# Patient Record
Sex: FEMALE | Race: WHITE | Employment: OTHER | URBAN - METROPOLITAN AREA
[De-identification: names, ages, dates, MRNs, and addresses within clinical notes are randomized per-mention and may not be internally consistent; named-entity substitution may affect disease eponyms.]

---

## 2017-05-08 PROCEDURE — 87088 URINE BACTERIA CULTURE: CPT | Performed by: INTERNAL MEDICINE

## 2017-05-08 PROCEDURE — 87186 SC STD MICRODIL/AGAR DIL: CPT | Performed by: INTERNAL MEDICINE

## 2017-05-08 PROCEDURE — 87086 URINE CULTURE/COLONY COUNT: CPT | Performed by: INTERNAL MEDICINE

## 2017-05-24 PROCEDURE — 87086 URINE CULTURE/COLONY COUNT: CPT | Performed by: INTERNAL MEDICINE

## 2017-06-05 PROCEDURE — 87086 URINE CULTURE/COLONY COUNT: CPT | Performed by: INTERNAL MEDICINE

## 2017-09-08 PROBLEM — H40.52X2: Status: ACTIVE | Noted: 2017-09-08

## 2017-09-18 PROCEDURE — 87186 SC STD MICRODIL/AGAR DIL: CPT | Performed by: INTERNAL MEDICINE

## 2017-09-18 PROCEDURE — 87086 URINE CULTURE/COLONY COUNT: CPT | Performed by: INTERNAL MEDICINE

## 2017-09-18 PROCEDURE — 87088 URINE BACTERIA CULTURE: CPT | Performed by: INTERNAL MEDICINE

## 2017-09-20 PROBLEM — R51.9 LEFT FACIAL PRESSURE AND PAIN: Status: ACTIVE | Noted: 2017-09-20

## 2017-09-20 PROBLEM — J33.9 NASAL POLYPS: Status: ACTIVE | Noted: 2017-09-20

## 2017-09-24 ENCOUNTER — HOSPITAL ENCOUNTER (EMERGENCY)
Facility: HOSPITAL | Age: 77
Discharge: HOME | End: 2017-09-24
Attending: EMERGENCY MEDICINE
Payer: MEDICARE

## 2017-09-24 VITALS
HEIGHT: 67 IN | DIASTOLIC BLOOD PRESSURE: 88 MMHG | WEIGHT: 163 LBS | RESPIRATION RATE: 18 BRPM | HEART RATE: 80 BPM | OXYGEN SATURATION: 97 % | SYSTOLIC BLOOD PRESSURE: 179 MMHG | TEMPERATURE: 99 F | BODY MASS INDEX: 25.58 KG/M2

## 2017-09-24 DIAGNOSIS — H53.9 VISUAL CHANGES: Primary | ICD-10-CM

## 2017-09-24 PROCEDURE — 99283 EMERGENCY DEPT VISIT LOW MDM: CPT

## 2017-09-24 NOTE — ED PROVIDER NOTES
Patient Seen in: [facility] Emergency Department    History   Patient presents with:   Eye Visual Problem (opthalmic)    Stated Complaint: hemorrhages on the eye  wants to see an opthomologist right away    HPI    Patient has a history of ankylosing PPD     Father had TB, seen by Dr. Deirdre Jacobo, treated for 9 months   • Intestinal disaccharidase deficiencies and disaccharide malabsorption    • Other and unspecified hyperlipidemia    • Rheumatoid arthritis and other inflammatory polyarthropathies Comment: w/BSO  5/4/09: UP GI ENDOSCOPY,BALL DIL,30MM  6/12/14: UP GI ENDOSCOPY,BALL DIL,30MM      Comment: distal esophageal stricture, bx and dilated                18-20 mm  5/4/09: UPPER GI ENDOSCOPY,BIOPSY      Comment: antral gastric erosions, distal 25.53 kg/m²         Physical Exam  Eyes: Lids and lashes are normal.  Extraocular movements are intact. There is 100% subconjunctival hematoma noted in both eyes. The pupils are midrange, round, and reactive.   The anterior chamber is deep and no hyphema p

## 2017-09-24 NOTE — ED INITIAL ASSESSMENT (HPI)
Pt reports bilateral scleral hemorrhages that started 3 days ago. Reports pain and blurred vision. Seen in urgent care today. Took prednisone PTA.

## 2017-09-25 PROBLEM — H40.52X0: Status: ACTIVE | Noted: 2017-09-08

## 2017-09-25 NOTE — ED NOTES
Pt given water and crackers upon request and MD approval. Pt instructed to take her prescription meds that she has with her upon Dr Philip Santacruz verbal order.

## 2017-10-02 PROCEDURE — 87086 URINE CULTURE/COLONY COUNT: CPT | Performed by: INTERNAL MEDICINE

## 2017-10-06 ENCOUNTER — HOSPITAL ENCOUNTER (INPATIENT)
Facility: HOSPITAL | Age: 77
LOS: 1 days | Discharge: HOME HEALTH | DRG: 543 | End: 2017-10-12
Attending: EMERGENCY MEDICINE | Admitting: HOSPITALIST
Payer: MEDICARE

## 2017-10-06 ENCOUNTER — APPOINTMENT (OUTPATIENT)
Dept: GENERAL RADIOLOGY | Facility: HOSPITAL | Age: 77
DRG: 543 | End: 2017-10-06
Attending: EMERGENCY MEDICINE
Payer: MEDICARE

## 2017-10-06 DIAGNOSIS — M54.9 BACK PAIN WITHOUT RADIATION: Primary | ICD-10-CM

## 2017-10-06 PROCEDURE — 96375 TX/PRO/DX INJ NEW DRUG ADDON: CPT

## 2017-10-06 PROCEDURE — 72100 X-RAY EXAM L-S SPINE 2/3 VWS: CPT | Performed by: EMERGENCY MEDICINE

## 2017-10-06 PROCEDURE — 85025 COMPLETE CBC W/AUTO DIFF WBC: CPT | Performed by: EMERGENCY MEDICINE

## 2017-10-06 PROCEDURE — 96374 THER/PROPH/DIAG INJ IV PUSH: CPT

## 2017-10-06 PROCEDURE — 99285 EMERGENCY DEPT VISIT HI MDM: CPT

## 2017-10-06 PROCEDURE — 80053 COMPREHEN METABOLIC PANEL: CPT | Performed by: EMERGENCY MEDICINE

## 2017-10-06 PROCEDURE — 96376 TX/PRO/DX INJ SAME DRUG ADON: CPT

## 2017-10-06 RX ORDER — KETOROLAC TROMETHAMINE 30 MG/ML
15 INJECTION, SOLUTION INTRAMUSCULAR; INTRAVENOUS EVERY 6 HOURS PRN
Status: DISCONTINUED | OUTPATIENT
Start: 2017-10-06 | End: 2017-10-07

## 2017-10-06 RX ORDER — BRIMONIDINE TARTRATE 2 MG/ML
1 SOLUTION/ DROPS OPHTHALMIC 3 TIMES DAILY
Status: DISCONTINUED | OUTPATIENT
Start: 2017-10-06 | End: 2017-10-12

## 2017-10-06 RX ORDER — ALBUTEROL SULFATE 90 UG/1
2 AEROSOL, METERED RESPIRATORY (INHALATION) EVERY 6 HOURS PRN
Status: DISCONTINUED | OUTPATIENT
Start: 2017-10-06 | End: 2017-10-12

## 2017-10-06 RX ORDER — HYDROMORPHONE HYDROCHLORIDE 1 MG/ML
0.5 INJECTION, SOLUTION INTRAMUSCULAR; INTRAVENOUS; SUBCUTANEOUS EVERY 2 HOUR PRN
Status: DISCONTINUED | OUTPATIENT
Start: 2017-10-06 | End: 2017-10-12

## 2017-10-06 RX ORDER — ONDANSETRON 2 MG/ML
INJECTION INTRAMUSCULAR; INTRAVENOUS
Status: DISPENSED
Start: 2017-10-06 | End: 2017-10-07

## 2017-10-06 RX ORDER — SULFASALAZINE 500 MG/1
1000 TABLET, DELAYED RELEASE ORAL 2 TIMES DAILY
Status: DISCONTINUED | OUTPATIENT
Start: 2017-10-06 | End: 2017-10-12

## 2017-10-06 RX ORDER — CYCLOBENZAPRINE HCL 5 MG
5 TABLET ORAL 3 TIMES DAILY PRN
Status: DISCONTINUED | OUTPATIENT
Start: 2017-10-06 | End: 2017-10-12

## 2017-10-06 RX ORDER — ONDANSETRON 2 MG/ML
4 INJECTION INTRAMUSCULAR; INTRAVENOUS ONCE
Status: COMPLETED | OUTPATIENT
Start: 2017-10-06 | End: 2017-10-06

## 2017-10-06 RX ORDER — ONDANSETRON 2 MG/ML
4 INJECTION INTRAMUSCULAR; INTRAVENOUS EVERY 6 HOURS PRN
Status: DISCONTINUED | OUTPATIENT
Start: 2017-10-06 | End: 2017-10-12

## 2017-10-06 RX ORDER — TIMOLOL MALEATE 5 MG/ML
1 SOLUTION/ DROPS OPHTHALMIC 2 TIMES DAILY
Status: DISCONTINUED | OUTPATIENT
Start: 2017-10-06 | End: 2017-10-12

## 2017-10-06 RX ORDER — DEXAMETHASONE SODIUM PHOSPHATE 4 MG/ML
10 VIAL (ML) INJECTION ONCE
Status: COMPLETED | OUTPATIENT
Start: 2017-10-06 | End: 2017-10-06

## 2017-10-06 RX ORDER — HYDROCODONE BITARTRATE AND ACETAMINOPHEN 5; 325 MG/1; MG/1
1 TABLET ORAL EVERY 6 HOURS PRN
Status: DISCONTINUED | OUTPATIENT
Start: 2017-10-06 | End: 2017-10-09

## 2017-10-06 RX ORDER — HYDRALAZINE HYDROCHLORIDE 25 MG/1
25 TABLET, FILM COATED ORAL EVERY 6 HOURS PRN
Status: DISCONTINUED | OUTPATIENT
Start: 2017-10-06 | End: 2017-10-12

## 2017-10-06 RX ORDER — HYDROMORPHONE HYDROCHLORIDE 1 MG/ML
1 INJECTION, SOLUTION INTRAMUSCULAR; INTRAVENOUS; SUBCUTANEOUS EVERY 2 HOUR PRN
Status: DISCONTINUED | OUTPATIENT
Start: 2017-10-06 | End: 2017-10-12

## 2017-10-06 RX ORDER — HYDROMORPHONE HYDROCHLORIDE 1 MG/ML
0.5 INJECTION, SOLUTION INTRAMUSCULAR; INTRAVENOUS; SUBCUTANEOUS EVERY 30 MIN PRN
Status: COMPLETED | OUTPATIENT
Start: 2017-10-06 | End: 2017-10-10

## 2017-10-06 RX ORDER — ACETAMINOPHEN 325 MG/1
650 TABLET ORAL EVERY 6 HOURS PRN
Status: DISCONTINUED | OUTPATIENT
Start: 2017-10-06 | End: 2017-10-12

## 2017-10-06 RX ORDER — ATORVASTATIN CALCIUM 10 MG/1
10 TABLET, FILM COATED ORAL NIGHTLY
Status: DISCONTINUED | OUTPATIENT
Start: 2017-10-06 | End: 2017-10-12

## 2017-10-06 NOTE — H&P
DMg hospitalist H+P    Emanuel Betancourt MD  CC back pain  HPI 75 yo female with multiple medical problems inclduing ankylosing spondylitis, GERD was recently treated for UTI and thus stopped Humira for her back issues(now restarted) Now developed se remember  2005: OTHER AMBL SURG      Comment: R leg tendon surgery with post-op wound                infection  No date: OTHER SURGICAL HISTORY      Comment: right ankle surgery  5/7/13: OTHER SURGICAL HISTORY      Comment:  left 1st metatarsophalangeal fu [OTHER] Brother        Social History  Social History   Marital status:   Spouse name: N/A    Years of education: N/A  Number of children: N/A     Occupational History  None on file     Social History Main Topics   Smoking status: Never Smoker    Sm (FLONASE) 50 MCG/ACT Nasal Suspension 1 spray by Nasal route 2 (two) times daily. Disp: 16 g Rfl: 1   Albuterol Sulfate  (90 Base) MCG/ACT Inhalation Aero Soln Inhale 2 puffs into the lungs every 6 (six) hours as needed (cough).  Disp: 1 Inhaler Rfl: intact in all four extremitties, no foot drop, she is able to move extremities    Labs  WBC 12.2 Hg 11 Plt 326    Na 140 K 3.6 Cl 110 BUN 21, creatinine 0.41 glucose 127  AST 30  ALT 51    X-ray lumbar spine personally reviewed result in EPIC no acute frac

## 2017-10-06 NOTE — ED PROVIDER NOTES
Patient Seen in: [facility] Emergency Department    History   Patient presents with:  Back Pain (musculoskeletal)    Stated Complaint: back pain    HPI    The patient is a 75-year-old female with a history of ankylosing spondylitis, who was recently APPENDECTOMY  No date: BIOPSY OF BREAST, INCISIONAL      Comment: L Breast  2005: CHOLECYSTECTOMY  2/13/06: COLONOSCOPY,BIOPSY      Comment: nonspecific erythema transverse colon (bx                marked acute & chronic colitis), ileum wnl  1990: Amilcar Helm 18-20 mm, Performed by Kelley Cramer at                [address] [place], Performed [facility]                [facility]  2/10: UPPER GI ENDOSCOPY,BIOPSY      Comment: 3 gastric ulcers from diclofenac, bx for lymphadenopathy. Oropharynx nml. Mucus membranes moist.   Supple neck  Cardiovascular: Regular rhythm without murmurs rubs or gallops, no peripheral edema or JVD. DP pulses 1+ bilaterally.   Lungs: Speaking full sentences without any distress or retraction Course  ------------------------------------------------------------   Blood was obtained and peripheral IV access was established. She was given IV Decadron and Dilaudid. X-ray of lumbar spine was obtained.   I believe her pain is musculoskeletal.  She h

## 2017-10-07 PROCEDURE — 86140 C-REACTIVE PROTEIN: CPT | Performed by: HOSPITALIST

## 2017-10-07 PROCEDURE — 87086 URINE CULTURE/COLONY COUNT: CPT | Performed by: HOSPITALIST

## 2017-10-07 PROCEDURE — 80048 BASIC METABOLIC PNL TOTAL CA: CPT | Performed by: HOSPITALIST

## 2017-10-07 PROCEDURE — 85652 RBC SED RATE AUTOMATED: CPT | Performed by: HOSPITALIST

## 2017-10-07 PROCEDURE — 85025 COMPLETE CBC W/AUTO DIFF WBC: CPT | Performed by: HOSPITALIST

## 2017-10-07 PROCEDURE — 97116 GAIT TRAINING THERAPY: CPT

## 2017-10-07 PROCEDURE — 97162 PT EVAL MOD COMPLEX 30 MIN: CPT

## 2017-10-07 PROCEDURE — 81001 URINALYSIS AUTO W/SCOPE: CPT | Performed by: HOSPITALIST

## 2017-10-07 PROCEDURE — 96376 TX/PRO/DX INJ SAME DRUG ADON: CPT

## 2017-10-07 PROCEDURE — 87040 BLOOD CULTURE FOR BACTERIA: CPT | Performed by: HOSPITALIST

## 2017-10-07 RX ORDER — POTASSIUM CHLORIDE 20 MEQ/1
40 TABLET, EXTENDED RELEASE ORAL EVERY 4 HOURS
Status: COMPLETED | OUTPATIENT
Start: 2017-10-07 | End: 2017-10-07

## 2017-10-07 RX ORDER — AMLODIPINE BESYLATE 5 MG/1
5 TABLET ORAL DAILY
Status: DISCONTINUED | OUTPATIENT
Start: 2017-10-07 | End: 2017-10-12

## 2017-10-07 NOTE — CONSULTS
[facility] Dosing Service  Antibiotic Dosing    Ward Lim is a 76year old female for whom pharmacy is dosing Ancef for treatment of  UTI. Allergies: is allergic to actonel [risedronate sodium]; milk; and proton pump inhibitors.     Vitals: BP 1

## 2017-10-07 NOTE — PAYOR COMM NOTE
--------------  ADMISSION REVIEW     Payor: [facility] [place] #:  [id number]    10/6    ED       Patient presents with:  Back Pain     Stated Complaint: back pain          The patient is a 24-year-old female with a history of ankylosing bowel sounds. Soft, nondistended. No HSM or masses. Nontender throughout abdomen. No CVA tenderness. Back: No midline step-offs but she is mildly tender throughout the lumbar spine in the paraspinal region of her lumbar back. No overlying skin changes. components within normal limits   CBC WITH DIFFERENTIAL WITH PLATELET     Narrative:      The following orders were created for panel order CBC WITH DIFFERENTIAL WITH PLATELET.      Xr Lumbar Spine (min 2 Views) (cpt=72100)     Result Date: 10/6/2017  PROCE

## 2017-10-07 NOTE — PLAN OF CARE
PAIN - ADULT    • Verbalizes/displays adequate comfort level or patient's stated pain goal Progressing        Patient/Family Goals    • Patient/Family Long Term Goal Progressing    • Patient/Family Short Term Goal Progressing        Plan of care explained

## 2017-10-07 NOTE — PHYSICAL THERAPY NOTE
PHYSICAL THERAPY EVALUATION - INPATIENT     Room Number: 385/385-A  Evaluation Date: 10/7/2017  Type of Evaluation: Initial  Physician Order: PT Eval and Treat    Presenting Problem: Back pain  Reason for Therapy: Mobility Dysfunction and Discharge Tawny tendon surgery with post-op wound                infection  No date: OTHER SURGICAL HISTORY      Comment: right ankle surgery  5/7/13: OTHER SURGICAL HISTORY      Comment:  left 1st metatarsophalangeal fusion.  Weil                osteotomy 2nd metatarsal j reports she is feeling better today    Patient self-stated goal is to feel better and go home    OBJECTIVE     Fall Risk: Standard fall risk    WEIGHT BEARING RESTRICTION                   PAIN ASSESSMENT  Ratin  Location: Back  Management Techniques: ambulation. The patient was able to perform supine to sit transition with supervision. The patient was able to perform sit to stand transfers with supervision.  The patient was able to ambulate 50 feet with supervision with some complaints of pain pain note Good  Frequency (Obs): 5x/week  Number of Visits to Meet Established Goals: 3      CURRENT GOALS    Goal #1 Patient is able to demonstrate supine - sit EOB @ level: independent     Goal #2 Patient is able to demonstrate transfers Sit to/from Stand at [facility]

## 2017-10-07 NOTE — PLAN OF CARE
PAIN - ADULT    • Verbalizes/displays adequate comfort level or patient's stated pain goal Progressing        Patient/Family Goals    • Patient/Family Short Term Goal Progressing        ALERT ,AWAKE, ORIENTED X4. REFUSED ALT EYE DROPS ORDER HERE.  PT STATES

## 2017-10-07 NOTE — PROGRESS NOTES
[facility] hospitalist daily note  Seen/exmined on 10/7/17    S; no chest pain, no SOB no abd pain but still has persistent back pain (lower back), constant, not radiating.  Patient denies numbness/tingling, she is able to move extremities, no loss of bowel/lorin

## 2017-10-08 ENCOUNTER — APPOINTMENT (OUTPATIENT)
Dept: MRI IMAGING | Facility: HOSPITAL | Age: 77
DRG: 543 | End: 2017-10-08
Attending: HOSPITALIST
Payer: MEDICARE

## 2017-10-08 PROBLEM — M54.9 BACK PAIN: Status: ACTIVE | Noted: 2017-10-08

## 2017-10-08 PROCEDURE — 96376 TX/PRO/DX INJ SAME DRUG ADON: CPT

## 2017-10-08 PROCEDURE — 72156 MRI NECK SPINE W/O & W/DYE: CPT | Performed by: HOSPITALIST

## 2017-10-08 PROCEDURE — 72149 MRI LUMBAR SPINE W/DYE: CPT | Performed by: HOSPITALIST

## 2017-10-08 PROCEDURE — A9575 INJ GADOTERATE MEGLUMI 0.1ML: HCPCS

## 2017-10-08 PROCEDURE — 72157 MRI CHEST SPINE W/O & W/DYE: CPT | Performed by: HOSPITALIST

## 2017-10-08 PROCEDURE — 84132 ASSAY OF SERUM POTASSIUM: CPT | Performed by: HOSPITALIST

## 2017-10-08 NOTE — CERTIFICATION
**Certification    PHYSICIAN Certification of Need for Inpatient Hospitalization    Based on the her current state of illness, Loyd Davidson requires inpatient hospitalization for her back pain, requiring multiple doses of IV pain medication (see MAR).  Marcelle Sharp

## 2017-10-08 NOTE — CM/SW NOTE
Patient failed inpatient criteria. Second level of review completed and supports observation. UR committee in agreement. Discussed with Dr. Tatum Toussaint who approves observation status. Observation and BALL letter given to the patient and order written.  Patient

## 2017-10-08 NOTE — PROGRESS NOTES
[facility] hospitalist daily note  Seen/exained on 10/8/17    S; patient with persistent severe back pain, no chest pain, no SOB, no loss of bowel/bladder control, no numbness/tingling    Medications in EPIC    PE;   10/08/17  0742   BP: (!) 162/53   Pulse: 8 culture and MRI result  Lisa Santoyo MD  [facility] hospitalist  [phone]

## 2017-10-08 NOTE — PAYOR COMM NOTE
Pt was in OBS status on 10/6. Attending placed in [address] [place] [address] on 10/8 but:   Appropriate for OBS status per guidelines for back pain. Status changed back to OBS on 10/8, CC44.     Bushra Yin  (MR # [id number])   Order Place in observation Once [ADT12] (Order pain control.  Per patient she CANNOT be on NSAIDs  Add lidocaine patch  -check ESR, CRP (patient with leukocytosis, recently had UTI)  -PT eval     Leukocytosis, Recent UTI; check UA        Elevated Blood pressure, patient with HTN ;   Start amlodipine

## 2017-10-08 NOTE — PLAN OF CARE
Impaired Functional Mobility    • Achieve highest/safest level of mobility/gait Progressing        PAIN - ADULT    • Verbalizes/displays adequate comfort level or patient's stated pain goal Progressing        Patient/Family Goals    • Patient/Family Short

## 2017-10-09 ENCOUNTER — APPOINTMENT (OUTPATIENT)
Dept: CT IMAGING | Facility: HOSPITAL | Age: 77
DRG: 543 | End: 2017-10-09
Attending: HOSPITALIST
Payer: MEDICARE

## 2017-10-09 PROCEDURE — 97116 GAIT TRAINING THERAPY: CPT

## 2017-10-09 PROCEDURE — 96376 TX/PRO/DX INJ SAME DRUG ADON: CPT

## 2017-10-09 PROCEDURE — 97530 THERAPEUTIC ACTIVITIES: CPT

## 2017-10-09 PROCEDURE — 85025 COMPLETE CBC W/AUTO DIFF WBC: CPT | Performed by: HOSPITALIST

## 2017-10-09 PROCEDURE — 80048 BASIC METABOLIC PNL TOTAL CA: CPT | Performed by: HOSPITALIST

## 2017-10-09 PROCEDURE — 70491 CT SOFT TISSUE NECK W/DYE: CPT | Performed by: HOSPITALIST

## 2017-10-09 RX ORDER — POLYETHYLENE GLYCOL 3350 17 G/17G
17 POWDER, FOR SOLUTION ORAL DAILY
Status: DISCONTINUED | OUTPATIENT
Start: 2017-10-09 | End: 2017-10-12

## 2017-10-09 RX ORDER — HYDROCODONE BITARTRATE AND ACETAMINOPHEN 5; 325 MG/1; MG/1
1 TABLET ORAL EVERY 6 HOURS PRN
Status: DISCONTINUED | OUTPATIENT
Start: 2017-10-09 | End: 2017-10-12

## 2017-10-09 RX ORDER — HYDROCODONE BITARTRATE AND ACETAMINOPHEN 5; 325 MG/1; MG/1
1-2 TABLET ORAL EVERY 6 HOURS PRN
Status: DISCONTINUED | OUTPATIENT
Start: 2017-10-09 | End: 2017-10-09

## 2017-10-09 RX ORDER — BISACODYL 10 MG
10 SUPPOSITORY, RECTAL RECTAL ONCE
Status: COMPLETED | OUTPATIENT
Start: 2017-10-09 | End: 2017-10-09

## 2017-10-09 RX ORDER — HYDROCODONE BITARTRATE AND ACETAMINOPHEN 5; 325 MG/1; MG/1
1 TABLET ORAL EVERY 6 HOURS
Status: DISCONTINUED | OUTPATIENT
Start: 2017-10-09 | End: 2017-10-10

## 2017-10-09 RX ORDER — LIDOCAINE 50 MG/G
1 PATCH TOPICAL EVERY 24 HOURS
Status: DISCONTINUED | OUTPATIENT
Start: 2017-10-09 | End: 2017-10-12

## 2017-10-09 RX ORDER — DOCUSATE SODIUM 100 MG/1
100 CAPSULE, LIQUID FILLED ORAL 2 TIMES DAILY
Status: DISCONTINUED | OUTPATIENT
Start: 2017-10-09 | End: 2017-10-12

## 2017-10-09 NOTE — PAYOR COMM NOTE
--------------  CONTINUED STAY REVIEW FOR 10/9    Payor: [address] [place] [address] #:  [id number]  Authorization Number: [id number]    Admit date: 10/8/17  Admit time: [address]    Admitting Physician: Spencer Gaytan MD  Attending Physician:  Spencer Gaytan

## 2017-10-09 NOTE — PLAN OF CARE
PAIN - ADULT    • Verbalizes/displays adequate comfort level or patient's stated pain goal Not Progressing        Patient/Family Goals    • Patient/Family Short Term Goal Not Progressing          SAFETY ADULT - FALL    • Free from fall injury Progressing

## 2017-10-09 NOTE — PROGRESS NOTES
Contacted [facility] to inform of order for brace for patient who has T12 compression fracture. When patient was asked to verify height & weight, she stated she was 5\"7\" now & not 5\"10\" as noted in chart.  Information given to Shanda as requested

## 2017-10-09 NOTE — PROGRESS NOTES
[facility] hospitalist daily note  Patient was seen/examined on 10/9/17    S; still has back pain. No numbness/tingling, no paresis. Reviewed with patient and her spouse results of the ERICK and Urine culture result.  I also spoke with the patient about alina rule out spine infection)  -pain medication ordered, including dilaudid IV  -patient with still significant pain.  Brace ordered         Leukocytosis, elevated ESR, CRP in immunosupressed patient; no enhancing lesions on MRi spine  -blood culture no growth

## 2017-10-09 NOTE — PHYSICAL THERAPY NOTE
PHYSICAL THERAPY TREATMENT NOTE - INPATIENT    Room Number: 385/385-A     Session: 1  Number of Visits to Meet Established Goals: 3    Presenting Problem: Back pain        MRI conclusion from 10/8/17.  Subacute moderate to marked superior endplate compress Comment: R leg tendon surgery with post-op wound                infection  No date: OTHER SURGICAL HISTORY      Comment: right ankle surgery  5/7/13: OTHER SURGICAL HISTORY      Comment:  left 1st metatarsophalangeal fusion.  Weil osteotom Static Sitting: Normal  Dynamic Sitting: Good           Static Standing: Fair +  Dynamic Standing: Fair +    ACTIVITY TOLERANCE  Room air    AM-PAC '6-Clicks' INPATIENT SHORT FORM - BASIC MOBILITY  How m questions and concerns addressed; Family present    ASSESSMENT   Pt reported of increased pain at all times. New findings of MRI, T12 subacute compression Fx. Per RNJanneth pt is awaiting TLSO but confirmed that the pt is okay to get up without brace.  Pt was

## 2017-10-09 NOTE — CM/SW NOTE
Rec'd voice mail message from pts spouse regarding observation status. Dr. Annia Juan discussed status with me as well. I discussed status with UR CM Pratt Purple and pt still only meeting observation criteria under [facility] guidelines. Met with pt in room.

## 2017-10-10 PROCEDURE — 97116 GAIT TRAINING THERAPY: CPT

## 2017-10-10 PROCEDURE — 97530 THERAPEUTIC ACTIVITIES: CPT

## 2017-10-10 RX ORDER — HYDROCODONE BITARTRATE AND ACETAMINOPHEN 10; 325 MG/1; MG/1
1 TABLET ORAL EVERY 4 HOURS PRN
Status: DISCONTINUED | OUTPATIENT
Start: 2017-10-10 | End: 2017-10-12

## 2017-10-10 RX ORDER — SENNOSIDES 8.6 MG
8.6 TABLET ORAL 2 TIMES DAILY
Status: DISCONTINUED | OUTPATIENT
Start: 2017-10-10 | End: 2017-10-12

## 2017-10-10 RX ORDER — BISACODYL 10 MG
10 SUPPOSITORY, RECTAL RECTAL
Status: DISCONTINUED | OUTPATIENT
Start: 2017-10-10 | End: 2017-10-12

## 2017-10-10 RX ORDER — HYDROCODONE BITARTRATE AND ACETAMINOPHEN 10; 325 MG/1; MG/1
2 TABLET ORAL EVERY 4 HOURS PRN
Status: DISCONTINUED | OUTPATIENT
Start: 2017-10-10 | End: 2017-10-12

## 2017-10-10 RX ORDER — SODIUM PHOSPHATE, DIBASIC AND SODIUM PHOSPHATE, MONOBASIC 7; 19 G/133ML; G/133ML
1 ENEMA RECTAL ONCE AS NEEDED
Status: COMPLETED | OUTPATIENT
Start: 2017-10-10 | End: 2017-10-10

## 2017-10-10 NOTE — PROGRESS NOTES
Fleet enema given this afternoon with no results. Patient states she feels like she just doesn't have any stool in her from not eating these past few days, bowel sounds hypoactive. Patient voided. She is still feeling nauseated. No abd distention.  Abd soft

## 2017-10-10 NOTE — PROGRESS NOTES
PATIENT HAS NOT HAD BM SINCE 10/6 AM. SHE IS NOT EATING WELL D/T INTERMITTENT NAUSEA. SHE WAS ONLY ABLE TO TOLERATE SOME PLAIN CHEERIOS THIS AM, BECAME NAUSEATED WITH EGGS AND SAUSAGE SHE ORDERED FOR BREAKFAST.  RN DISCUSSED DIET OPTIONS AND TO EAT MORE OF

## 2017-10-10 NOTE — PROGRESS NOTES
RN ATTEMPTED TO GIVE FLEET ENEMA, PATIENT REFUSED STATING SHE JUST WORKED WITH P.T AND NEEDS TO REST NOW, ASKING RN TO COME BACK IN 2 HOURS. RN DISCUSSED IMPORTANCE OF ENEMA.    RN NOTIFIED DR Felipe FARNSWORTH'S (ON CALL FOR DR Luly Barfield [facility]) RN FOR NEW CON

## 2017-10-10 NOTE — PROGRESS NOTES
[facility] hospitalist daily note    S; pt reports some LE pain. 179/65, on 2L, not using IS.      Medications in EPIC    PE;   10/10/17  1200   BP: 154/53   Pulse: 80   Resp:    Temp:      Gen: awake, alert, no respiratory distress   HEENT; mmm, anicteri Rheumatology. For now hold Humira   -ESR and CRP are elevated wihtout a clear explanation (ESR was normal in the past, this rapid rise is worrisome for infection).  I spoke with patient's rheumatologist on 10/9/17     Preventative SCDs    Reviewed chart inc

## 2017-10-10 NOTE — PHYSICAL THERAPY NOTE
PHYSICAL THERAPY TREATMENT NOTE - INPATIENT    Room Number: 385/385-A     Session: 2  Number of Visits to Meet Established Goals: 3    Presenting Problem: Back pain        MRI conclusion from 10/8/17.  Subacute moderate to marked superior endplate compress Comment: R leg tendon surgery with post-op wound                infection  No date: OTHER SURGICAL HISTORY      Comment: right ankle surgery  5/7/13: OTHER SURGICAL HISTORY      Comment:  left 1st metatarsophalangeal fusion.  Weil osteotom Static Sitting: Normal  Dynamic Sitting: Good           Static Standing: Fair +  Dynamic Standing: Fair +    ACTIVITY TOLERANCE  Room air    AM-PAC '6-Clicks' INPATIENT SHORT FORM - BASIC MOBILITY  How m with sup and RW today. Pt was trained on stairs with two rails and spinal education completed.  Pt will benefit from home PT .               DISCHARGE RECOMMENDATIONS  PT Discharge Recommendations: Home with home health PT     PLAN  PT Treatment Plan: Bed m

## 2017-10-10 NOTE — HOME CARE LIAISON
MET WITH PTNT TO DISCUSS HOME HEALTH SERVICES AND COVERAGE CRITERIA. PTNT AGREEABLE TO Ras Rodriguez. PTNT GIVEN RESIDENTIAL BROCHURE. RESIDENTIAL WITH PROVIDE SN/PT ON DISCHARGE.     Thank you for this referral,   Abdulaziz John

## 2017-10-10 NOTE — CM/SW NOTE
Met again with pt and now her spouse. They continue to have questions about observation status and charges. I directed them to hospital billing department and [facility] for specific charges and copay/deductible information.      Discussed discharge

## 2017-10-11 ENCOUNTER — APPOINTMENT (OUTPATIENT)
Dept: GENERAL RADIOLOGY | Facility: HOSPITAL | Age: 77
DRG: 543 | End: 2017-10-11
Attending: INTERNAL MEDICINE
Payer: MEDICARE

## 2017-10-11 PROCEDURE — 97530 THERAPEUTIC ACTIVITIES: CPT

## 2017-10-11 PROCEDURE — 80048 BASIC METABOLIC PNL TOTAL CA: CPT | Performed by: INTERNAL MEDICINE

## 2017-10-11 PROCEDURE — 83735 ASSAY OF MAGNESIUM: CPT | Performed by: INTERNAL MEDICINE

## 2017-10-11 PROCEDURE — 85027 COMPLETE CBC AUTOMATED: CPT | Performed by: INTERNAL MEDICINE

## 2017-10-11 PROCEDURE — 97116 GAIT TRAINING THERAPY: CPT

## 2017-10-11 PROCEDURE — 96375 TX/PRO/DX INJ NEW DRUG ADDON: CPT

## 2017-10-11 PROCEDURE — 74020 XR ABDOMEN, OBSTRUCTIVE SERIES (CPT=74020): CPT | Performed by: INTERNAL MEDICINE

## 2017-10-11 RX ORDER — ONDANSETRON 4 MG/1
4 TABLET, ORALLY DISINTEGRATING ORAL ONCE
Status: COMPLETED | OUTPATIENT
Start: 2017-10-11 | End: 2017-10-11

## 2017-10-11 RX ORDER — ONDANSETRON 4 MG/1
4 TABLET, FILM COATED ORAL EVERY 8 HOURS PRN
Qty: 20 TABLET | Refills: 2 | Status: SHIPPED | OUTPATIENT
Start: 2017-10-11 | End: 2017-10-26

## 2017-10-11 RX ORDER — SODIUM CHLORIDE 9 MG/ML
INJECTION, SOLUTION INTRAVENOUS CONTINUOUS
Status: DISCONTINUED | OUTPATIENT
Start: 2017-10-11 | End: 2017-10-12

## 2017-10-11 RX ORDER — HYDROCODONE BITARTRATE AND ACETAMINOPHEN 10; 325 MG/1; MG/1
1-2 TABLET ORAL EVERY 4 HOURS PRN
Qty: 30 TABLET | Refills: 0 | Status: ON HOLD | OUTPATIENT
Start: 2017-10-11 | End: 2017-10-21

## 2017-10-11 RX ORDER — METOCLOPRAMIDE HYDROCHLORIDE 5 MG/ML
10 INJECTION INTRAMUSCULAR; INTRAVENOUS EVERY 6 HOURS PRN
Status: DISCONTINUED | OUTPATIENT
Start: 2017-10-11 | End: 2017-10-12

## 2017-10-11 RX ORDER — METOCLOPRAMIDE 10 MG/1
10 TABLET ORAL EVERY 6 HOURS PRN
Status: DISCONTINUED | OUTPATIENT
Start: 2017-10-11 | End: 2017-10-12

## 2017-10-11 RX ORDER — AMLODIPINE BESYLATE 5 MG/1
5 TABLET ORAL DAILY
Qty: 30 TABLET | Refills: 0 | Status: SHIPPED | OUTPATIENT
Start: 2017-10-12 | End: 2017-11-10

## 2017-10-11 NOTE — PHYSICAL THERAPY NOTE
PHYSICAL THERAPY TREATMENT NOTE - INPATIENT    Room Number: 385/385-A     Session: 3  Number of Visits to Meet Established Goals: 3    Presenting Problem: Back pain  MRI conclusion from 10/8/17.  Subacute moderate to marked superior endplate compression fr Comment: R leg tendon surgery with post-op wound                infection  No date: OTHER SURGICAL HISTORY      Comment: right ankle surgery  5/7/13: OTHER SURGICAL HISTORY      Comment:  left 1st metatarsophalangeal fusion.  Weil                osteotomy 2 Static Sitting: Good  Dynamic Sitting: Not tested           Static Standing: Fair -  Dynamic Standing: Fair -    ACTIVITY TOLERANCE  O2 Saturation: 92%  Room air  No shor management. THERAPEUTIC EXERCISES  Lower Extremity Ankle pumps     Upper Extremity      Position sitting   Repetitions   15   Sets   1     Patient End of Session: In bed;Needs met;Call light within reach;RN aware of session/findings; All patient questio

## 2017-10-11 NOTE — DISCHARGE SUMMARY
[facility] Internal Medicine Discharge Summary   Patient ID:  Areli Alonso  [id number]  25 year old   8/20/1940    Admit date: 10/6/2017    Discharge date and time: 10/11/2017     Attending Physician: Zev Osborne MD     Primary Care Physician: Austin Lowery (patient on Humira as outpatient thus needed to rule out spine infection)  -pain medication ordered, including dilaudid IV  -patient with some pain.  Brace ordered  -dc on norco 10s 1-2q4h, # 30, and zofran for nausea  -d/w ortho spine, no acute surgical in AmLODIPine Besylate 5 MG Tabs  Commonly known as:  NORVASC  Take 1 tablet (5 mg total) by mouth daily.   Start taking on:  10/12/2017     HYDROcodone-acetaminophen  MG Tabs  Commonly known as:  NORCO  Take 1-2 tablets by mouth every 4 (four) hours a tablets (2.5 mg total) by mouth nightly as needed for Sleep.            Where to Get Your Medications      These medications were sent to [facility], [address] [place] [address] Ancelmo Banda [phone], [facility]. Nelli Taveras [address] fossa.  NECK GLANDS:  The parotid, submandibular, and thyroid glands are unremarkable. Incidentally noted are small, 4-6 mm low density nodules within the thyroid gland. LYMPH NODES:  No pathological-appearing or enlarged lymph nodes.   SKULL BASE:  Belia Fraser canal or neuroforaminal stenosis. L1-L2: No disc herniation or spinal canal or neuroforaminal stenosis. L2-L3: No disc herniation, spinal canal or neuroforaminal stenosis. L3-L4: No disc herniation, spinal canal or neuroforaminal stenosis.  L4-L5: Minimal d suspected pathology prior to and after intravenous gadolinium injection. CONTRAST USED:  15 cc of paramagnetic gadolinium-based contrast was injected intravenously. FINDINGS:   Alignment of the cervical spine is unremarkable.  There is minimal loss of C6 in the cervical or thoracic spine. 3. No significant enhancing lesion. No evidence of an epidural abscess.    Dictated by: Marivel Mirnada MD on 10/08/2017 at 17:28     Approved by: Marivel Miranda MD               Operative Procedures:      Code [place]

## 2017-10-11 NOTE — PLAN OF CARE
Nausea noted x 2 after PT session, states last bm yesterday, Will page Dr. Ghanshyam Navarrete for Zofran script for home.

## 2017-10-11 NOTE — PLAN OF CARE
Vomited x 2, Zofran po ordered & given x 1, Dr. Tresa Jennings notified, obstructive series ordered, O2 sats on 2 liters O2=94%, states does not wear O2 at home, weaned to room air, O2sats =86%, encouraged IS, placed on 2 liters O2, updated on plan of care, d/c

## 2017-10-12 VITALS
DIASTOLIC BLOOD PRESSURE: 40 MMHG | HEIGHT: 70 IN | WEIGHT: 165 LBS | HEART RATE: 74 BPM | TEMPERATURE: 98 F | BODY MASS INDEX: 23.62 KG/M2 | RESPIRATION RATE: 18 BRPM | OXYGEN SATURATION: 95 % | SYSTOLIC BLOOD PRESSURE: 138 MMHG

## 2017-10-12 PROBLEM — R11.2 NAUSEA & VOMITING: Status: ACTIVE | Noted: 2017-10-12

## 2017-10-12 PROCEDURE — 96376 TX/PRO/DX INJ SAME DRUG ADON: CPT

## 2017-10-12 PROCEDURE — 90471 IMMUNIZATION ADMIN: CPT

## 2017-10-12 PROCEDURE — 84132 ASSAY OF SERUM POTASSIUM: CPT | Performed by: INTERNAL MEDICINE

## 2017-10-12 NOTE — DISCHARGE SUMMARY
[facility] Internal Medicine Discharge Summary   Patient ID:  Belia Valente  [id number]  75 year old   8/20/1940    Admit date: 10/6/2017    Discharge date and time: 10/12/2017     Attending Physician: Lucio Leonardo MD     Primary Care Physician: Herber Alvarez fracture  -ESR and CRP elevated  -no enhancing lesions on MRI (patient on Humira as outpatient thus needed to rule out spine infection)  -pain medication ordered, including dilaudid IV  -patient with some pain.  Brace ordered  -dc on norco 10s 1-2q4h, # 30, focal deficits      Discharge meds     Medication List      START taking these medications    AmLODIPine Besylate 5 MG Tabs  Commonly known as:  NORVASC  Take 1 tablet (5 mg total) by mouth daily.      HYDROcodone-acetaminophen  MG Tabs  Commonly know Zolpidem Tartrate 5 MG Tabs  Commonly known as:  AMBIEN  Take 0.5 tablets (2.5 mg total) by mouth nightly as needed for Sleep.            Where to Get Your Medications      These medications were sent to [facility], [address] [place] [address] masslike density occupies and appears to expand the anterior nasal fossa. NECK GLANDS:  The parotid, submandibular, and thyroid glands are unremarkable. Incidentally noted are small, 4-6 mm low density nodules within the thyroid gland.  LYMPH NODES:  No p Minimal disc bulge with facet hypertrophic changes are noted. No spinal canal or neuroforaminal stenosis. L1-L2: No disc herniation or spinal canal or neuroforaminal stenosis. L2-L3: No disc herniation, spinal canal or neuroforaminal stenosis.  L3-L4: No di variety of imaging planes and parameters were utilized for visualization of suspected pathology prior to and after intravenous gadolinium injection. CONTRAST USED:  15 cc of paramagnetic gadolinium-based contrast was injected intravenously.   FINDINGS: stenosis. 2. There is no significant spinal canal or neuroforaminal stenosis in the cervical or thoracic spine. 3. No significant enhancing lesion. No evidence of an epidural abscess.    Dictated by: Shane Day MD on 10/08/2017 at 17:28     Cathy Mancia

## 2017-10-12 NOTE — PLAN OF CARE
Completed O2 walk/home O2 eval with pt.   At rest on room air - 93-97%  Walking on room air - 92-96%  Walking with 2L O2 - 97%  At rest on 2L O2 - 94-96%

## 2017-10-12 NOTE — PLAN OF CARE
Pt discharge education completed with pt and spouse. All questions addressed. All pt belongings packed and sent with pt. Discharged home with HHPT via personal car.

## 2017-10-13 NOTE — CDS QUERY
Diagnosis Clarification – Compression Fracture  [facility]  Dear Dr. Yanelis Gonzalez:  Clinical information in the patient's medical record (provided below) includes documentation of Compression Fracture.  For accurate ICD-10-CM code

## 2017-10-13 NOTE — CM/SW NOTE
10/13/17 0800   Discharge disposition   Discharged to: Home-Health   Name of Facillity/Home Care/Hospice Residential   Discharge transportation Private car   DC 10/12/17

## 2017-10-15 NOTE — PAYOR COMM NOTE
Lacy Foley  (MR # [id number])   Order Place in observation Once Mary Drake (Order [id number])   Order Requisition   Place in observation Once (Order #[id number]) on 10/6/17        Question Answer   Diagnosis Back pain without radiation   Level of Care Telem exacerbated by rolling over into the stretcher or changing her positioning. There have been no alleviating factors except for slight improvement after her injection of Humira this past Monday.     Past Medical History:   Diagnosis Date   • Ankylosing spond symmetric  Skin: No masses or nodules or abnormalities.   Psych: Normal interaction, cooperative with exam      ED Course[KW.1]     Labs Reviewed   COMP METABOLIC PANEL (14) - Abnormal; Notable for the following:        Result Value    Glucose 127 (*)     B she had nausea. No fever  Current Outpatient Prescriptions on File Prior to Encounter:  TraMADol HCl 50 MG Oral Tab Take 1 tablet (50 mg total) by mouth every 6 (six) hours as needed for Pain.    AcetaZOLAMIDE  MG Oral Capsule SR 12 Hr Take 1 capsule OR TABS 2 TABLETS EVERY 4 HOURS AS NEEDED   CALCIUM 600 OR 3 tablets daily     Gen: awake, alert, no respiratory distress but appears in plain  Back + lumbar spine tenderness to palpation, no skin erythema, no fluctuance, no vesicles  Neuro CN II-XII gross UTI)     Preventative SCDs     Patient is NOT ready for discharge. Still in pain, requiring IV pain medication. Also MRI spine ordered    10/9  IV Dilaudid x 5  IV Zofran x 1  Start Lidoderm patch  Norco x 2    MRI: 1.  Subacute moderate to marked superior negative torie, patient without dysuria  -she does have swelling in her left parotid area and reports taht she was supposed to have her sinuses evaluated, CT ordered (spoke with radiologist, CT neck soft tissue with contrast would shows us the areas of int ok     Hyponatremia  -mild, 135     Hypokalemia replaced per protocol     Constipation  -laxitive, bowel regimen  -will order KUB if needed     Ankyliosing spondylitis, RA follow up with Rheumatology.  For now hold Humira   -ESR and CRP are elevated wihtout

## 2017-10-16 ENCOUNTER — HOSPITAL ENCOUNTER (OUTPATIENT)
Facility: HOSPITAL | Age: 77
Setting detail: OBSERVATION
Discharge: HOME HEALTH | End: 2017-10-21
Attending: EMERGENCY MEDICINE | Admitting: HOSPITALIST
Payer: MEDICARE

## 2017-10-16 ENCOUNTER — APPOINTMENT (OUTPATIENT)
Dept: GENERAL RADIOLOGY | Facility: HOSPITAL | Age: 77
End: 2017-10-16
Attending: EMERGENCY MEDICINE
Payer: MEDICARE

## 2017-10-16 DIAGNOSIS — IMO0001 COMPRESSION FRACTURE OF VERTEBRA, INITIAL ENCOUNTER: Primary | ICD-10-CM

## 2017-10-16 PROCEDURE — 96374 THER/PROPH/DIAG INJ IV PUSH: CPT

## 2017-10-16 PROCEDURE — 99285 EMERGENCY DEPT VISIT HI MDM: CPT

## 2017-10-16 PROCEDURE — 96375 TX/PRO/DX INJ NEW DRUG ADDON: CPT

## 2017-10-16 PROCEDURE — 72072 X-RAY EXAM THORAC SPINE 3VWS: CPT | Performed by: EMERGENCY MEDICINE

## 2017-10-16 PROCEDURE — 72100 X-RAY EXAM L-S SPINE 2/3 VWS: CPT | Performed by: EMERGENCY MEDICINE

## 2017-10-16 PROCEDURE — 96376 TX/PRO/DX INJ SAME DRUG ADON: CPT

## 2017-10-16 RX ORDER — MORPHINE SULFATE 4 MG/ML
4 INJECTION, SOLUTION INTRAMUSCULAR; INTRAVENOUS ONCE
Status: DISCONTINUED | OUTPATIENT
Start: 2017-10-16 | End: 2017-10-16

## 2017-10-16 RX ORDER — MORPHINE SULFATE 4 MG/ML
4 INJECTION, SOLUTION INTRAMUSCULAR; INTRAVENOUS ONCE
Status: COMPLETED | OUTPATIENT
Start: 2017-10-16 | End: 2017-10-16

## 2017-10-17 PROBLEM — M48.50XA VERTEBRAL COMPRESSION FRACTURE (HCC): Status: ACTIVE | Noted: 2017-10-17

## 2017-10-17 PROBLEM — IMO0001 COMPRESSION FRACTURE OF VERTEBRA, INITIAL ENCOUNTER: Status: ACTIVE | Noted: 2017-10-17

## 2017-10-17 PROCEDURE — 81001 URINALYSIS AUTO W/SCOPE: CPT | Performed by: HOSPITALIST

## 2017-10-17 PROCEDURE — 87088 URINE BACTERIA CULTURE: CPT | Performed by: HOSPITALIST

## 2017-10-17 PROCEDURE — 87186 SC STD MICRODIL/AGAR DIL: CPT | Performed by: HOSPITALIST

## 2017-10-17 PROCEDURE — 96361 HYDRATE IV INFUSION ADD-ON: CPT

## 2017-10-17 PROCEDURE — 96376 TX/PRO/DX INJ SAME DRUG ADON: CPT

## 2017-10-17 PROCEDURE — 87086 URINE CULTURE/COLONY COUNT: CPT | Performed by: HOSPITALIST

## 2017-10-17 PROCEDURE — 96375 TX/PRO/DX INJ NEW DRUG ADDON: CPT

## 2017-10-17 RX ORDER — AMLODIPINE BESYLATE 5 MG/1
5 TABLET ORAL DAILY
Status: DISCONTINUED | OUTPATIENT
Start: 2017-10-17 | End: 2017-10-21

## 2017-10-17 RX ORDER — SODIUM PHOSPHATE, DIBASIC AND SODIUM PHOSPHATE, MONOBASIC 7; 19 G/133ML; G/133ML
1 ENEMA RECTAL
Status: DISCONTINUED | OUTPATIENT
Start: 2017-10-17 | End: 2017-10-21

## 2017-10-17 RX ORDER — HYDROMORPHONE HYDROCHLORIDE 1 MG/ML
0.5 INJECTION, SOLUTION INTRAMUSCULAR; INTRAVENOUS; SUBCUTANEOUS EVERY 30 MIN PRN
Status: ACTIVE | OUTPATIENT
Start: 2017-10-17 | End: 2017-10-17

## 2017-10-17 RX ORDER — HYDROMORPHONE HYDROCHLORIDE 1 MG/ML
0.5 INJECTION, SOLUTION INTRAMUSCULAR; INTRAVENOUS; SUBCUTANEOUS
Status: DISCONTINUED | OUTPATIENT
Start: 2017-10-17 | End: 2017-10-21

## 2017-10-17 RX ORDER — SODIUM CHLORIDE 9 MG/ML
INJECTION, SOLUTION INTRAVENOUS CONTINUOUS
Status: DISCONTINUED | OUTPATIENT
Start: 2017-10-17 | End: 2017-10-21

## 2017-10-17 RX ORDER — HYDROMORPHONE HYDROCHLORIDE 1 MG/ML
1 INJECTION, SOLUTION INTRAMUSCULAR; INTRAVENOUS; SUBCUTANEOUS
Status: DISCONTINUED | OUTPATIENT
Start: 2017-10-17 | End: 2017-10-21

## 2017-10-17 RX ORDER — POLYETHYLENE GLYCOL 3350 17 G/17G
17 POWDER, FOR SOLUTION ORAL DAILY
Status: DISCONTINUED | OUTPATIENT
Start: 2017-10-17 | End: 2017-10-21

## 2017-10-17 RX ORDER — ATORVASTATIN CALCIUM 10 MG/1
10 TABLET, FILM COATED ORAL NIGHTLY
Status: DISCONTINUED | OUTPATIENT
Start: 2017-10-17 | End: 2017-10-21

## 2017-10-17 RX ORDER — DOCUSATE SODIUM 100 MG/1
100 CAPSULE, LIQUID FILLED ORAL 2 TIMES DAILY
Status: DISCONTINUED | OUTPATIENT
Start: 2017-10-17 | End: 2017-10-21

## 2017-10-17 RX ORDER — CYCLOBENZAPRINE HCL 5 MG
5 TABLET ORAL 3 TIMES DAILY PRN
Status: DISCONTINUED | OUTPATIENT
Start: 2017-10-17 | End: 2017-10-21

## 2017-10-17 RX ORDER — SULFASALAZINE 500 MG/1
1000 TABLET, DELAYED RELEASE ORAL 2 TIMES DAILY
Status: DISCONTINUED | OUTPATIENT
Start: 2017-10-17 | End: 2017-10-21

## 2017-10-17 RX ORDER — ONDANSETRON 2 MG/ML
4 INJECTION INTRAMUSCULAR; INTRAVENOUS EVERY 6 HOURS PRN
Status: DISCONTINUED | OUTPATIENT
Start: 2017-10-17 | End: 2017-10-21

## 2017-10-17 RX ORDER — HYDRALAZINE HYDROCHLORIDE 25 MG/1
25 TABLET, FILM COATED ORAL EVERY 6 HOURS PRN
Status: DISCONTINUED | OUTPATIENT
Start: 2017-10-17 | End: 2017-10-21

## 2017-10-17 RX ORDER — HYDROCODONE BITARTRATE AND ACETAMINOPHEN 10; 325 MG/1; MG/1
2 TABLET ORAL EVERY 6 HOURS PRN
Status: DISCONTINUED | OUTPATIENT
Start: 2017-10-17 | End: 2017-10-18 | Stop reason: DRUGHIGH

## 2017-10-17 RX ORDER — MORPHINE SULFATE 4 MG/ML
INJECTION, SOLUTION INTRAMUSCULAR; INTRAVENOUS
Status: COMPLETED
Start: 2017-10-17 | End: 2017-10-17

## 2017-10-17 RX ORDER — HYDROCODONE BITARTRATE AND ACETAMINOPHEN 10; 325 MG/1; MG/1
1 TABLET ORAL EVERY 6 HOURS PRN
Status: DISCONTINUED | OUTPATIENT
Start: 2017-10-17 | End: 2017-10-18 | Stop reason: DRUGHIGH

## 2017-10-17 RX ORDER — ONDANSETRON 2 MG/ML
4 INJECTION INTRAMUSCULAR; INTRAVENOUS EVERY 4 HOURS PRN
Status: DISCONTINUED | OUTPATIENT
Start: 2017-10-17 | End: 2017-10-17

## 2017-10-17 RX ORDER — LIDOCAINE 50 MG/G
1 PATCH TOPICAL EVERY 24 HOURS
Status: DISCONTINUED | OUTPATIENT
Start: 2017-10-17 | End: 2017-10-21

## 2017-10-17 RX ORDER — MORPHINE SULFATE 4 MG/ML
2 INJECTION, SOLUTION INTRAMUSCULAR; INTRAVENOUS ONCE
Status: COMPLETED | OUTPATIENT
Start: 2017-10-17 | End: 2017-10-17

## 2017-10-17 RX ORDER — BISACODYL 10 MG
10 SUPPOSITORY, RECTAL RECTAL
Status: DISCONTINUED | OUTPATIENT
Start: 2017-10-17 | End: 2017-10-21

## 2017-10-17 NOTE — ED PROVIDER NOTES
Patient Seen in: [facility] Emergency Department    History   Patient presents with:  Back Pain (musculoskeletal)    Stated Complaint:     HPI    26-year-old female, medical history as noted below, recently discharged for admission for pain control Comment: pt cannot remember  No date: NEEDLE BIOPSY RIGHT      Comment: pt cannot remember  2005: OTHER AMBL SURG      Comment: R leg tendon surgery with post-op wound                infection  No date: OTHER SURGICAL HISTORY      Comment: right ankle surg Musculo-skelatal Disorder Brother      ankylosing spondylitis   • viral headache [OTHER] Brother        Smoking status: Never Smoker                                                              Smokeless tobacco: Never Used                      Comment: no flexion, knee extension. 5 out of 5 strength with plantar flexion and dorsiflexion of ankle. 5 out of 5 strength with bilateral EHL. Babinski's downgoing bilaterally. Reflexes 2+ at bilateral Achilles and patellar tendons. Gait deferred due to pain. MD            Mri Spine Lumbar(contrast Only) (cpt=72149)    Result Date: 10/8/2017  CONCLUSION:   1. Subacute moderate to marked superior endplate compression fracture of T12.  2. No significant spinal canal or neural foraminal stenosis.   3. No enhancing control and a consult has been placed for spine surgery.       Disposition and Plan     Clinical Impression:  Compression fracture of vertebra, initial encounter ([facility] [address]  (primary encounter diagnosis)    Disposition:  Admit    Follow-up:  No follow-up provide

## 2017-10-17 NOTE — ED INITIAL ASSESSMENT (HPI)
Pt admitted to hospital through the ER on 10/6 for compression fracture to T12. Pt was discharged from the hospital on 10/12 with pain medications. Pt states ran out of her pain medications this morning, called 911 secondary to back pain.   Pt given fenta

## 2017-10-17 NOTE — PHYSICAL THERAPY NOTE
PT order received, however will hold until pt consulted by ortho spine for possible surgical intervention including kyphoplasty. Will re-attempt as medically appropriate.

## 2017-10-17 NOTE — H&P
EM Hospitalist H&P       CC: Patient presents with:  Back Pain (musculoskeletal)       PCP: Kate Ochoa MD    History of Present Illness: Patient is a 76year old female with PMH sig for akylosing spondylitis, RA, GERD and HTN is admitted with compl impairment     iritis        PSH  Past Surgical History:  No date: APPENDECTOMY  No date: BIOPSY OF BREAST, INCISIONAL      Comment: L Breast  2005: CHOLECYSTECTOMY  2/13/06: COLONOSCOPY,BIOPSY      Comment: nonspecific erythema transverse colon (bx esophageal                stricture, gastric bx, esophageal dilation                18-20 mm, Performed by Clayton Ray at                [facility], Performed by                Clayton Ray at [facility]  2/10: UPPE History   Problem Relation Age of Onset   • Other [OTHER] Father      AAA, TB   • Cancer Mother      uterus   • Musculo-skelatal Disorder Brother      ankolosing spondylitis   • Heart Disorder Brother      valve disorder/pacemaker   • Musculo-skelatal Diso ALT, AST, ALB, AMYLASE, LIPASE, LDH in the last 72 hours. Invalid input(s): ALPHOS, TBIL, DBIL, TPROT    No results for input(s): TROP in the last 72 hours.         Radiology: Xr Abdomen, Obstructive Series (cpt=74020)    Result Date: 10/11/2017  PROCEDU vertebral body now with approximately 50% loss of vertebral body height DISC SPACES:  Mild disc space narrowing and lower thoracic vertebral body. PARASPINOUS:  Calcified abdominal aorta.       CONCLUSION:  Slight interval progression in degree of T12 super mm low density nodules within the thyroid gland. LYMPH NODES:  No pathological-appearing or enlarged lymph nodes. SKULL BASE:  Foramina are symmetric without bony erosion. VASCULATURE:  Limited views are unremarkable.   BONES:  No significant osseous lesi herniation, spinal canal or neuroforaminal stenosis. L3-L4: No disc herniation, spinal canal or neuroforaminal stenosis. L4-L5: Minimal disc bulge is noted. No significant spinal canal or neural foraminal stenosis.  L5-S1: Minimal disc bulge without signifi Dictated by: Elvia Lacy MD on 10/16/2017 at 21:33     Approved by: Elvia Lacy MD            Xr Lumbar Spine (min 2 Views) (cpt=72100)    Result Date: 10/6/2017  PROCEDURE:  XR LUMBAR SPINE (MIN 2 VIEWS) (CPT=72100)  TECHNIQUE:  AP, lateral, and coned down without spinal canal or neuroforaminal stenosis. C5-C6:  Left paracentral osteophyte causing moderate left neuroforaminal stenosis. No spinal canal stenosis. C6-C7:  Posterior yesterday without spinal canal or neural foraminal stenosis.  C7-T1:  No signific see pt in consultation for possible kyphoplasty given she is failing outpt conservative management  -has TLSO brace  -bowel regimen to prevent constipation  -NPO, IVF while awaiting orthospine recs    Urinary frequency  -check UA/Ucx    HTN  -cont amlodipi

## 2017-10-17 NOTE — CONSULTS
EM Hospitalist H&P       CC: Patient presents with:  Back Pain (musculoskeletal)       PCP: Jenna Gerardo MD    History of Present Illness: Patient is a 76year old female with PMH sig for akylosing spondylitis, RA, GERD and HTN is admitted with compl impairment     iritis        PSH  Past Surgical History:  No date: APPENDECTOMY  No date: BIOPSY OF BREAST, INCISIONAL      Comment: L Breast  2005: CHOLECYSTECTOMY  2/13/06: COLONOSCOPY,BIOPSY      Comment: nonspecific erythema transverse colon (bx esophageal                stricture, gastric bx, esophageal dilation                18-20 mm, Performed by Geraldyne Karey at                [address], Performed by                PaletteAppyne Karey at [address]  2/10: UPPE History   Problem Relation Age of Onset   • Other [OTHER] Father      AAA, TB   • Cancer Mother      uterus   • Musculo-skelatal Disorder Brother      ankolosing spondylitis   • Heart Disorder Brother      valve disorder/pacemaker   • Musculo-skelatal Diso ALT, AST, ALB, AMYLASE, LIPASE, LDH in the last 72 hours. Invalid input(s): ALPHOS, TBIL, DBIL, TPROT    No results for input(s): TROP in the last 72 hours.         Radiology: Xr Abdomen, Obstructive Series (cpt=74020)    Result Date: 10/11/2017  PROCEDU vertebral body now with approximately 50% loss of vertebral body height DISC SPACES:  Mild disc space narrowing and lower thoracic vertebral body. PARASPINOUS:  Calcified abdominal aorta.       CONCLUSION:  Slight interval progression in degree of T12 super mm low density nodules within the thyroid gland. LYMPH NODES:  No pathological-appearing or enlarged lymph nodes. SKULL BASE:  Foramina are symmetric without bony erosion. VASCULATURE:  Limited views are unremarkable.   BONES:  No significant osseous lesi herniation, spinal canal or neuroforaminal stenosis. L3-L4: No disc herniation, spinal canal or neuroforaminal stenosis. L4-L5: Minimal disc bulge is noted. No significant spinal canal or neural foraminal stenosis.  L5-S1: Minimal disc bulge without signifi Dictated by: Franca Liao MD on 10/16/2017 at 21:33     Approved by: Franca Liao MD            Xr Lumbar Spine (min 2 Views) (cpt=72100)    Result Date: 10/6/2017  PROCEDURE:  XR LUMBAR SPINE (MIN 2 VIEWS) (CPT=72100)  TECHNIQUE:  AP, lateral, and coned down without spinal canal or neuroforaminal stenosis. C5-C6:  Left paracentral osteophyte causing moderate left neuroforaminal stenosis. No spinal canal stenosis. C6-C7:  Posterior yesterday without spinal canal or neural foraminal stenosis.  C7-T1:  No signific see pt in consultation for possible kyphoplasty given she is failing outpt conservative management  -has TLSO brace  -bowel regimen to prevent constipation  -NPO, IVF while awaiting orthospine recs    Urinary frequency  -check UA/Ucx    HTN  -cont amlodipi

## 2017-10-17 NOTE — PAYOR COMM NOTE
--------------  ADMISSION REVIEW     Payor: [address] [place], [place] [address] #:  [id number]  Authorization Number: N/A    Admit date: N/A  Admit time: N/A       Admitting Physician: Megan Humphrey MD  Attending Physician:  DO Larisa Lee C • Diffuse cystic mastopathy    • Disorder of bone and cartilage, unspecified     osteopenia   • Elevated blood pressure reading without diagnosis of hypertension    • Esophageal reflux    • Glaucoma    • High blood pressure    • Indeterminate PPD     Fat ala  2-26-14: SKIN SURGERY      Comment: BCC nod to right superior helix / MMS done  10/7/14: SKIN SURGERY      Comment: 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 There is no tenderness. There is no guarding. Musculoskeletal: Exhibits no edema or tenderness. Neurological: Pt is alert and oriented to person, place, and time. No cranial nerve deficit. Skin: Skin is warm and dry.    Psychiatric: Normal mood may be helpful for further evaluation if clinical concern persists.     Dictated by: Mare Streeter MD on 10/16/2017 at 22:06     Approved by: Mare Streeter MD            Ct Soft Tissue Of Neck(contrast Only) (IUZ=78045)    Result Date: 10/9/2017  CONCLUSI lesion. No evidence of an epidural abscess.    Dictated by: Baljeet Han MD on 10/08/2017 at 17:28     Approved by: Baljeet Han MD                  I was comp and that would be able to control her pain in discharge her with a refill but unfortu Intravenous Richie Aguilera, ECTOR      morphINE sulfate (PF) 4 MG/ML injection 4 mg     Date Action Dose Route User    10/16/2017 2135 Given 4 mg Intravenous Osman LINO RN      morphINE sulfate (PF) 4 MG/ML injection 2 mg     Date Action Dose

## 2017-10-17 NOTE — PLAN OF CARE
PAIN - ADULT    • Verbalizes/displays adequate comfort level or patient's stated pain goal Progressing        Patient/Family Goals    • Patient/Family Short Term Goal Progressing        SAFETY ADULT - FALL    • Free from fall injury Progressing        Aura

## 2017-10-18 ENCOUNTER — APPOINTMENT (OUTPATIENT)
Dept: CT IMAGING | Facility: HOSPITAL | Age: 77
End: 2017-10-18
Attending: RADIOLOGY
Payer: MEDICARE

## 2017-10-18 ENCOUNTER — APPOINTMENT (OUTPATIENT)
Dept: MRI IMAGING | Facility: HOSPITAL | Age: 77
End: 2017-10-18
Attending: HOSPITALIST
Payer: MEDICARE

## 2017-10-18 ENCOUNTER — APPOINTMENT (OUTPATIENT)
Dept: INTERVENTIONAL RADIOLOGY/VASCULAR | Facility: HOSPITAL | Age: 77
End: 2017-10-18
Attending: HOSPITALIST
Payer: MEDICARE

## 2017-10-18 PROCEDURE — 72148 MRI LUMBAR SPINE W/O DYE: CPT | Performed by: HOSPITALIST

## 2017-10-18 PROCEDURE — 96376 TX/PRO/DX INJ SAME DRUG ADON: CPT

## 2017-10-18 PROCEDURE — 72131 CT LUMBAR SPINE W/O DYE: CPT | Performed by: RADIOLOGY

## 2017-10-18 RX ORDER — HYDROCODONE BITARTRATE AND ACETAMINOPHEN 10; 325 MG/1; MG/1
1 TABLET ORAL EVERY 6 HOURS PRN
Status: DISCONTINUED | OUTPATIENT
Start: 2017-10-18 | End: 2017-10-19

## 2017-10-18 RX ORDER — LIDOCAINE HYDROCHLORIDE 10 MG/ML
INJECTION, SOLUTION INFILTRATION; PERINEURAL
Status: DISCONTINUED
Start: 2017-10-18 | End: 2017-10-18 | Stop reason: WASHOUT

## 2017-10-18 RX ORDER — HYDROCODONE BITARTRATE AND ACETAMINOPHEN 10; 325 MG/1; MG/1
2 TABLET ORAL EVERY 6 HOURS PRN
Status: DISCONTINUED | OUTPATIENT
Start: 2017-10-18 | End: 2017-10-19

## 2017-10-18 RX ORDER — MIDAZOLAM HYDROCHLORIDE 1 MG/ML
INJECTION INTRAMUSCULAR; INTRAVENOUS
Status: DISCONTINUED
Start: 2017-10-18 | End: 2017-10-18 | Stop reason: WASHOUT

## 2017-10-18 NOTE — PHYSICAL THERAPY NOTE
Chart reviewed. Spoke with nursing who reports that patient is scheduled for kyphoplasty later today.  Will await post op orders to initiate PT eval.

## 2017-10-18 NOTE — PAYOR COMM NOTE
--------------  CONTINUED STAY REVIEW 10/18    Payor: [address] [place], [place] [address]Manuela Ely #:  [id number]  Authorization Number: N/A    Admit date: N/A  Admit time: N/A    Admitting Physician: Sia Gomez MD  Attending Physician:  DO Florence Connell

## 2017-10-18 NOTE — CM/SW NOTE
10/18/17 1500   CM/SW Referral Data   Referral Source Physician   Reason for Referral Discharge planning   Informant Patient;Spouse   Readmission Assessment   Factors that patient feels contributed to this readmission Other (only choose if nothing else brace.  The patient informed MSW that she would like to resume [facility] and might hire extra help from her friend who is a  upon dc.       [facility]  P:[phone]  STACEY Hope

## 2017-10-18 NOTE — PLAN OF CARE
PAIN - ADULT    • Verbalizes/displays adequate comfort level or patient's stated pain goal Progressing        SAFETY ADULT - FALL    • Free from fall injury Progressing          A&O x 4. VSS. O2 2L per NC.  Pain 6-8/10 depending on positioning per pt, state

## 2017-10-18 NOTE — PROGRESS NOTES
[facility] Hospitalist Progress Note     PCP: Tenzin Jackson MD    SUBJECTIVE:  No CP, SOB, or N/V. Pt states pain was severe this am in her back and she notes the urinary frequency has not improved and she now has burning with urination.     OBJECTIVE:  Temp: acute worsening of her back pain.     Acute worsening of her mid back pain  -due to T12 compression fx  -xray with slight worsening in appearance of the compression deformity  -norco 10s not helping as outpt, now on dilaudid IV prn  -has TLSO brace  -plan

## 2017-10-19 ENCOUNTER — APPOINTMENT (OUTPATIENT)
Dept: INTERVENTIONAL RADIOLOGY/VASCULAR | Facility: HOSPITAL | Age: 77
End: 2017-10-19
Attending: HOSPITALIST
Payer: MEDICARE

## 2017-10-19 PROCEDURE — 85610 PROTHROMBIN TIME: CPT | Performed by: HOSPITALIST

## 2017-10-19 PROCEDURE — 96376 TX/PRO/DX INJ SAME DRUG ADON: CPT

## 2017-10-19 RX ORDER — MIDAZOLAM HYDROCHLORIDE 1 MG/ML
INJECTION INTRAMUSCULAR; INTRAVENOUS
Status: DISCONTINUED
Start: 2017-10-19 | End: 2017-10-19 | Stop reason: WASHOUT

## 2017-10-19 RX ORDER — HYDROCODONE BITARTRATE AND ACETAMINOPHEN 10; 325 MG/1; MG/1
1 TABLET ORAL EVERY 4 HOURS PRN
Status: DISCONTINUED | OUTPATIENT
Start: 2017-10-19 | End: 2017-10-21

## 2017-10-19 RX ORDER — CEFAZOLIN SODIUM 1 G/3ML
INJECTION, POWDER, FOR SOLUTION INTRAMUSCULAR; INTRAVENOUS
Status: DISCONTINUED
Start: 2017-10-19 | End: 2017-10-19 | Stop reason: WASHOUT

## 2017-10-19 RX ORDER — HYDROCODONE BITARTRATE AND ACETAMINOPHEN 10; 325 MG/1; MG/1
2 TABLET ORAL EVERY 4 HOURS PRN
Status: DISCONTINUED | OUTPATIENT
Start: 2017-10-19 | End: 2017-10-21

## 2017-10-19 RX ORDER — LIDOCAINE HYDROCHLORIDE 10 MG/ML
INJECTION, SOLUTION INFILTRATION; PERINEURAL
Status: DISCONTINUED
Start: 2017-10-19 | End: 2017-10-19 | Stop reason: WASHOUT

## 2017-10-19 NOTE — PLAN OF CARE
Pt returned from IR on bed. VSS. Pt and spouse visibly upset, provided emotional support and updated to POC. Per IR, pt needs to complete tx for UTI prior to kypho. Dr. Patience Crandall updated, see new orders. Notified PT of new orders. Will continue to monitor.

## 2017-10-19 NOTE — PHYSICAL THERAPY NOTE
Chart reviewed, pt continues to await kyphoplasty scheduled later today. Will d/c current PT eval order and await new order following procedure when medically appropriate. RN aware.

## 2017-10-19 NOTE — PROGRESS NOTES
[facility]  Progress Note      Areli Alonso Patient Status:  Observation    1940 MRN [id number]   [facility] 3SW-A Attending Mason Grant, [address] [facility] Day # 0 PCP Nicole Shafer MD       Subjective:   Nicolette Grew that did not get pro

## 2017-10-19 NOTE — PLAN OF CARE
Pt up in chair. Removed TLSO brace by self. Reminded pt that TLSO brace should be on when OOB. Pt verbalized understanding but states that it was hurting her so she took it off. Notified Dr. Radha Verduzco, see new order. Will continue to monitor.

## 2017-10-19 NOTE — PROGRESS NOTES
[facility] Hospitalist Progress Note     PCP: Tata Wong MD    SUBJECTIVE:  No CP, SOB, or N/V. Pt very anxious. Had bm yesterday. Still having urinary incontinence and burning with urination.     OBJECTIVE:  Temp:  [97.4 °F (36.3 °C)-98.6 °F (37 °C)] 98 HYDROcodone-acetaminophen **OR** HYDROcodone-acetaminophen, bisacodyl, HYDROmorphone HCl PF **OR** HYDROmorphone HCl PF, Cyclobenzaprine HCl, ondansetron HCl, hydrALAzine HCl, bisacodyl, FLEET ENEMA       Assessment/Plan:     Principal Problem:    Comp

## 2017-10-20 PROCEDURE — 97162 PT EVAL MOD COMPLEX 30 MIN: CPT

## 2017-10-20 PROCEDURE — 96376 TX/PRO/DX INJ SAME DRUG ADON: CPT

## 2017-10-20 PROCEDURE — 97116 GAIT TRAINING THERAPY: CPT

## 2017-10-20 PROCEDURE — 96365 THER/PROPH/DIAG IV INF INIT: CPT

## 2017-10-20 PROCEDURE — 97530 THERAPEUTIC ACTIVITIES: CPT

## 2017-10-20 NOTE — PROGRESS NOTES
[facility] Hospitalist Progress Note     PCP: Nicole Shafer MD    SUBJECTIVE:  No CP, SOB, or N/V. Pt still having some urinary incontinence but states that the burning is improving.   Pain is ok with the norco 1 tab but sometimes it's not lasting the full 4 **OR** HYDROcodone-acetaminophen, bisacodyl, HYDROmorphone HCl PF **OR** HYDROmorphone HCl PF, Cyclobenzaprine HCl, ondansetron HCl, hydrALAzine HCl, bisacodyl, FLEET ENEMA       Assessment/Plan:     Principal Problem:    Compression fracture of vertebra,

## 2017-10-20 NOTE — PHYSICAL THERAPY NOTE
PHYSICAL THERAPY EVALUATION - INPATIENT     Room Number: 367/367-A  Evaluation Date: 10/20/2017  Type of Evaluation: Initial  Physician Order: PT Eval and Treat    Presenting Problem: Compression fx of vertebra  Reason for Therapy: Mobility Dysfunction APPENDECTOMY  No date: BIOPSY OF BREAST, INCISIONAL      Comment: L Breast  2005: CHOLECYSTECTOMY  2/13/06: COLONOSCOPY,BIOPSY      Comment: nonspecific erythema transverse colon (bx                marked acute & chronic colitis), ileum wnl  1990: Matt Dubois 18-20 mm, Performed by Derick Clark at                [address] [place] [address], Performed by                Derick Clark at [address] [place] [address]  2/10: UPPER GI ENDOSCOPY,BIOPSY      Comment: 3 gastric ulcers from diclofenac, bx for Standing: Fair    ADDITIONAL TESTS  Additional Tests: Elderly Mobility Scale     Elderly Mobility Scale: 12                           NEUROLOGICAL FINDINGS  Neurological Findings: Sensation           Sensation: intact       ACTIVITY TOLERANCE  Room air  No mechanics  Don/Doff ortho/prosthesis  Functional activity tolerated  Gait training  Neuromuscular re-educate  Posture    Patient End of Session: Up in chair;Needs met;Call light within reach;RN aware of session/findings; All patient questions and concerns a education; Family education;Gait training;Range of motion; Neuromuscular re-educate;Strengthening;Stoop training;Stair training;Transfer training;Balance training  Rehab Potential : Good  Frequency (Obs): 5x/week  Number of Visits to Meet Established Goals:

## 2017-10-21 VITALS
SYSTOLIC BLOOD PRESSURE: 157 MMHG | BODY MASS INDEX: 24.67 KG/M2 | RESPIRATION RATE: 16 BRPM | OXYGEN SATURATION: 92 % | DIASTOLIC BLOOD PRESSURE: 66 MMHG | WEIGHT: 157.19 LBS | HEART RATE: 78 BPM | TEMPERATURE: 98 F | HEIGHT: 67 IN

## 2017-10-21 PROCEDURE — 96376 TX/PRO/DX INJ SAME DRUG ADON: CPT

## 2017-10-21 RX ORDER — CEFDINIR 300 MG/1
300 CAPSULE ORAL 2 TIMES DAILY
Status: DISCONTINUED | OUTPATIENT
Start: 2017-10-21 | End: 2017-10-21

## 2017-10-21 RX ORDER — LACTULOSE 10 G/15ML
20 SOLUTION ORAL 2 TIMES DAILY
Status: DISCONTINUED | OUTPATIENT
Start: 2017-10-21 | End: 2017-10-21

## 2017-10-21 RX ORDER — HYDROCODONE BITARTRATE AND ACETAMINOPHEN 10; 325 MG/1; MG/1
1-2 TABLET ORAL EVERY 4 HOURS PRN
Qty: 30 TABLET | Refills: 0 | Status: SHIPPED | OUTPATIENT
Start: 2017-10-21 | End: 2017-10-21

## 2017-10-21 RX ORDER — POLYETHYLENE GLYCOL 3350 17 G/17G
17 POWDER, FOR SOLUTION ORAL DAILY
Qty: 30 EACH | Refills: 0 | Status: SHIPPED | OUTPATIENT
Start: 2017-10-21 | End: 2018-02-21 | Stop reason: ALTCHOICE

## 2017-10-21 RX ORDER — NITROFURANTOIN 25; 75 MG/1; MG/1
100 CAPSULE ORAL 2 TIMES DAILY
Qty: 13 CAPSULE | Refills: 0 | Status: SHIPPED | OUTPATIENT
Start: 2017-10-21 | End: 2017-10-21

## 2017-10-21 RX ORDER — PSEUDOEPHEDRINE HCL 30 MG
100 TABLET ORAL 2 TIMES DAILY
Qty: 60 CAPSULE | Refills: 0 | Status: SHIPPED | OUTPATIENT
Start: 2017-10-21 | End: 2017-12-19

## 2017-10-21 RX ORDER — HYDROCODONE BITARTRATE AND ACETAMINOPHEN 10; 325 MG/1; MG/1
1-2 TABLET ORAL EVERY 4 HOURS PRN
Qty: 80 TABLET | Refills: 0 | Status: ON HOLD | OUTPATIENT
Start: 2017-10-21 | End: 2017-11-17

## 2017-10-21 RX ORDER — CEFDINIR 300 MG/1
300 CAPSULE ORAL 2 TIMES DAILY
Qty: 13 CAPSULE | Refills: 0 | Status: SHIPPED | OUTPATIENT
Start: 2017-10-21 | End: 2017-10-28

## 2017-10-21 RX ORDER — LACTULOSE 10 G/15ML
20 SOLUTION ORAL EVERY 6 HOURS PRN
Status: DISCONTINUED | OUTPATIENT
Start: 2017-10-21 | End: 2017-10-21

## 2017-10-21 RX ORDER — NITROFURANTOIN 25; 75 MG/1; MG/1
100 CAPSULE ORAL 2 TIMES DAILY
Status: DISCONTINUED | OUTPATIENT
Start: 2017-10-21 | End: 2017-10-21

## 2017-10-21 NOTE — DISCHARGE SUMMARY
General Medicine Discharge Summary     Patient ID:  Areli Alonso  76year old  8/20/1940    Admit date: 10/16/2017    Discharge date and time: 10/21/17    Attending Physician: DO Bryan Hay paresthesias/numbness. She denies loss of bowel/bladder continence but is having urinary freq without dysuria and thinks she could have UTI. She otherwise has no complaints other than pain and inability to do anything.   She states her pain medication has which have CHANGED    HYDROcodone-acetaminophen  MG Oral Tab  Take 1-2 tablets by mouth every 4 (four) hours as needed.       CONTINUE these medications which have NOT CHANGED    Ondansetron HCl (ZOFRAN) 4 mg tablet  Take 1 tablet (4 mg total) by mout active wheezing  Abdomen: nontender, nondistended, intact BS  Extremities: no pedal edema   Neuro: moving all 4 extremities      Total time coordinating care for discharge: 75 minutes    Neha Sotomayor MD  [facility] Hospitalist  [phone]

## 2017-10-21 NOTE — CM/SW NOTE
nakul notified pt will dc home today. nakul noted order for 3-1 commode. Referral made via [facility] to [facility]. nakul notified residential [facility]. Pt/spouse requesting ambulance transportation home.  nakul arranged [facility] for 3pm    [address]

## 2017-10-21 NOTE — PLAN OF CARE
GASTROINTESTINAL - ADULT    • Minimal or absence of nausea and vomiting Adequate for Discharge    • Maintains or returns to baseline bowel function Adequate for Discharge        Impaired Functional Mobility    • Achieve highest/safest level of mobility/gai

## 2017-10-21 NOTE — PHYSICAL THERAPY NOTE
Attempted to see patient, but she declined to participate in physical therapy as she had just gotten back into bed and taken the brace off and she has severe pain with movement. She also reports she is being discharged to home in 1-2 hours.

## 2017-10-21 NOTE — CM/SW NOTE
10/21/17 1300   Discharge disposition   Discharged to: Home-Health   Name of Facillity/Home Care/Hospice Residential   HME provider [facility]   Discharge transportation [employer]  ([id number])

## 2017-10-24 ENCOUNTER — APPOINTMENT (OUTPATIENT)
Dept: GENERAL RADIOLOGY | Facility: HOSPITAL | Age: 77
End: 2017-10-24
Attending: EMERGENCY MEDICINE
Payer: MEDICARE

## 2017-10-24 ENCOUNTER — HOSPITAL ENCOUNTER (EMERGENCY)
Facility: HOSPITAL | Age: 77
Discharge: HOME | End: 2017-10-24
Attending: EMERGENCY MEDICINE
Payer: MEDICARE

## 2017-10-24 VITALS
HEIGHT: 67 IN | OXYGEN SATURATION: 100 % | HEART RATE: 73 BPM | SYSTOLIC BLOOD PRESSURE: 151 MMHG | DIASTOLIC BLOOD PRESSURE: 61 MMHG | RESPIRATION RATE: 19 BRPM | BODY MASS INDEX: 25.74 KG/M2 | WEIGHT: 164 LBS | TEMPERATURE: 98 F

## 2017-10-24 DIAGNOSIS — IMO0001 COMPRESSION FRACTURE OF VERTEBRA WITH ROUTINE HEALING, SUBSEQUENT ENCOUNTER: Primary | ICD-10-CM

## 2017-10-24 PROCEDURE — 80048 BASIC METABOLIC PNL TOTAL CA: CPT | Performed by: EMERGENCY MEDICINE

## 2017-10-24 PROCEDURE — 87086 URINE CULTURE/COLONY COUNT: CPT | Performed by: EMERGENCY MEDICINE

## 2017-10-24 PROCEDURE — 72110 X-RAY EXAM L-2 SPINE 4/>VWS: CPT | Performed by: EMERGENCY MEDICINE

## 2017-10-24 PROCEDURE — 72220 X-RAY EXAM SACRUM TAILBONE: CPT | Performed by: EMERGENCY MEDICINE

## 2017-10-24 PROCEDURE — 72072 X-RAY EXAM THORAC SPINE 3VWS: CPT | Performed by: EMERGENCY MEDICINE

## 2017-10-24 PROCEDURE — 99285 EMERGENCY DEPT VISIT HI MDM: CPT

## 2017-10-24 PROCEDURE — 96376 TX/PRO/DX INJ SAME DRUG ADON: CPT

## 2017-10-24 PROCEDURE — 85025 COMPLETE CBC W/AUTO DIFF WBC: CPT | Performed by: EMERGENCY MEDICINE

## 2017-10-24 PROCEDURE — 81001 URINALYSIS AUTO W/SCOPE: CPT | Performed by: EMERGENCY MEDICINE

## 2017-10-24 PROCEDURE — 96374 THER/PROPH/DIAG INJ IV PUSH: CPT

## 2017-10-24 RX ORDER — MORPHINE SULFATE 4 MG/ML
2 INJECTION, SOLUTION INTRAMUSCULAR; INTRAVENOUS ONCE
Status: COMPLETED | OUTPATIENT
Start: 2017-10-24 | End: 2017-10-24

## 2017-10-24 NOTE — ED NOTES
Report given to [address] [place] [address],  reports we can send patient to [facility] at 1700 via [facility].

## 2017-10-24 NOTE — ED PROVIDER NOTES
Patient Seen in: [facility] Emergency Department    History   Patient presents with:  Back Pain (musculoskeletal)    Stated Complaint: back pain, hx of broken vertebra s/p fall    HPI    58-year-old female presents emergency room with chief complain treated for 9 months   • Intestinal disaccharidase deficiencies and disaccharide malabsorption    • Muscle weakness    • Other and unspecified hyperlipidemia    • Rheumatoid arthritis and other inflammatory polyarthropathies     RA   • Visual impairment GI ENDOSCOPY,BALL DIL,30MM  6/12/14: UP GI ENDOSCOPY,BALL DIL,30MM      Comment: distal esophageal stricture, bx and dilated                18-20 mm  5/4/09: UPPER GI ENDOSCOPY,BIOPSY      Comment: antral gastric erosions, distal esophageal awake, alert, well-appearing, in no acute distress. HEENT:  no scleral icterus. Mucous membranes are moist, oropharynx is clear, uvula midline. Scalp is atraumatic.   NECK: No midline cervical spine tenderness or step-off   Back: There is tenderness to the DIFFERENTIAL[[id number]]          Abnormal            Final result                 Please view results for these tests on the individual orders.    URINE CULTURE, ROUTINE       ============================================================  ED Course  --------

## 2017-10-24 NOTE — ED INITIAL ASSESSMENT (HPI)
75 y/o female to ED with c/o of uncontrolled back pain. Patient was recently discharged from hospital last saturday due to having a fractured vertebrae s/p fall in the bathtub.  Patient reports she feels like there is more than \"one break in my back\", pat

## 2017-10-25 ENCOUNTER — SNF VISIT (OUTPATIENT)
Dept: INTERNAL MEDICINE CLINIC | Age: 77
End: 2017-10-25

## 2017-10-25 ENCOUNTER — LAB ENCOUNTER (OUTPATIENT)
Dept: LAB | Age: 77
End: 2017-10-25
Attending: FAMILY MEDICINE
Payer: MEDICARE

## 2017-10-25 VITALS
DIASTOLIC BLOOD PRESSURE: 76 MMHG | WEIGHT: 164 LBS | HEART RATE: 76 BPM | SYSTOLIC BLOOD PRESSURE: 123 MMHG | BODY MASS INDEX: 26 KG/M2 | RESPIRATION RATE: 18 BRPM | TEMPERATURE: 97 F | OXYGEN SATURATION: 93 %

## 2017-10-25 DIAGNOSIS — N39.0 UTI (URINARY TRACT INFECTION): Primary | ICD-10-CM

## 2017-10-25 DIAGNOSIS — R53.81 PHYSICAL DECONDITIONING: ICD-10-CM

## 2017-10-25 DIAGNOSIS — S22.080A T12 COMPRESSION FRACTURE (HCC): Primary | ICD-10-CM

## 2017-10-25 DIAGNOSIS — M45.9 ANKYLOSING SPONDYLITIS, UNSPECIFIED SITE OF SPINE (HCC): ICD-10-CM

## 2017-10-25 DIAGNOSIS — K21.9 GASTROESOPHAGEAL REFLUX DISEASE WITHOUT ESOPHAGITIS: ICD-10-CM

## 2017-10-25 DIAGNOSIS — E55.9 VITAMIN D DEFICIENCY: ICD-10-CM

## 2017-10-25 DIAGNOSIS — F51.01 PRIMARY INSOMNIA: ICD-10-CM

## 2017-10-25 DIAGNOSIS — M54.9 INTRACTABLE BACK PAIN: ICD-10-CM

## 2017-10-25 DIAGNOSIS — N39.0 URINARY TRACT INFECTION WITHOUT HEMATURIA, SITE UNSPECIFIED: ICD-10-CM

## 2017-10-25 DIAGNOSIS — K59.03 DRUG-INDUCED CONSTIPATION: ICD-10-CM

## 2017-10-25 DIAGNOSIS — J40 BRONCHITIS: ICD-10-CM

## 2017-10-25 PROCEDURE — 36415 COLL VENOUS BLD VENIPUNCTURE: CPT

## 2017-10-25 PROCEDURE — 82306 VITAMIN D 25 HYDROXY: CPT

## 2017-10-25 PROCEDURE — 85025 COMPLETE CBC W/AUTO DIFF WBC: CPT

## 2017-10-25 PROCEDURE — 99310 SBSQ NF CARE HIGH MDM 45: CPT | Performed by: NURSE PRACTITIONER

## 2017-10-25 PROCEDURE — 80053 COMPREHEN METABOLIC PANEL: CPT

## 2017-10-25 RX ORDER — BISACODYL 10 MG
10 SUPPOSITORY, RECTAL RECTAL
COMMUNITY
End: 2018-06-27

## 2017-10-25 NOTE — PROGRESS NOTES
Emily Bynum  : 1940  Age 76year old  female patient is admitted to Facility: [place] [address] for VERÓNICA after re-hospitalization for intractable back pain.     [facility] admit date:  10.6.17   T12 compression fx  Discharge to home Disorder of bone and cartilage, unspecified     osteopenia   • Elevated blood pressure reading without diagnosis of hypertension    • Esophageal reflux    • Glaucoma    • High blood pressure    • Indeterminate PPD     Father had TB, seen by Dr. 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 Disp: 80 tablet Rfl: 0   cefdinir 300 MG Oral Cap Take 1 capsule (300 mg total) by mouth 2 (two) times daily.  Disp: 13 capsule Rfl: 0   docusate sodium 100 MG Oral Cap Take 100 mg Adalimumab (HUMIRA PEN) 40 MG/0.8ML Subcutaneous Pen-injector Kit INJECT 40 MG INTO THE SKIN EVERY 7 DAYS Disp: 12 each Rfl: 1   CALCIUM 600 OR 3 tablets daily Disp:  Rfl:        VITALS:  /76   Pulse 76   Temp (!) 97.4 °F (36.3 °C)   Resp 18   Wt pink, moist, pharynx no exudate, no visible cerumen.   NECK: supple; FROM; no JVD, no TMG, no carotid bruits  BREAST: no masses, no nipple discharge, no nodes; normal skin  RESPIRATORY:clear to percussion and auscultation; no wheezing  CARDIOVASCULAR: S1, S UTI  1. Cefdinir 300 mg BID until 10.28.17  2. Cranberry supplement 450 mg daily  3. Monitor for fever/sxs  4. Ask Dr Jada Alvarado if wants UA repeated prior to kyphoplasty    Weakness/deconditioning  1. PT/OT eval and tx  2. ELOS 10 days  3.  Plan DC on or be

## 2017-10-27 ENCOUNTER — TELEPHONE (OUTPATIENT)
Dept: MEDSURG UNIT | Facility: HOSPITAL | Age: 77
End: 2017-10-27

## 2017-10-29 ENCOUNTER — SNF ADMIT/H&P (OUTPATIENT)
Dept: FAMILY MEDICINE CLINIC | Facility: CLINIC | Age: 77
End: 2017-10-29

## 2017-10-29 DIAGNOSIS — H40.52X0: ICD-10-CM

## 2017-10-29 DIAGNOSIS — K21.9 GASTROESOPHAGEAL REFLUX DISEASE WITHOUT ESOPHAGITIS: ICD-10-CM

## 2017-10-29 DIAGNOSIS — N39.0 RECURRENT UTI: ICD-10-CM

## 2017-10-29 DIAGNOSIS — M06.09 RHEUMATOID ARTHRITIS OF MULTIPLE SITES WITH NEGATIVE RHEUMATOID FACTOR (HCC): ICD-10-CM

## 2017-10-29 DIAGNOSIS — M54.9 INTRACTABLE BACK PAIN: ICD-10-CM

## 2017-10-29 DIAGNOSIS — M45.9 ANKYLOSING SPONDYLITIS, UNSPECIFIED SITE OF SPINE (HCC): Primary | ICD-10-CM

## 2017-10-29 DIAGNOSIS — H20.13 CHRONIC ANTERIOR UVEITIS OF BOTH EYES: ICD-10-CM

## 2017-10-29 DIAGNOSIS — H20.00 HLA-B27 ASSOCIATED ACUTE ANTERIOR UVEITIS: ICD-10-CM

## 2017-10-29 DIAGNOSIS — IMO0001 COMPRESSION FRACTURE OF VERTEBRA, INITIAL ENCOUNTER: ICD-10-CM

## 2017-10-29 DIAGNOSIS — E55.9 VITAMIN D DEFICIENCY: ICD-10-CM

## 2017-10-29 DIAGNOSIS — F51.01 PRIMARY INSOMNIA: ICD-10-CM

## 2017-10-29 DIAGNOSIS — E78.2 MIXED HYPERLIPIDEMIA: ICD-10-CM

## 2017-10-29 PROCEDURE — 99305 1ST NF CARE MODERATE MDM 35: CPT | Performed by: FAMILY MEDICINE

## 2017-10-30 ENCOUNTER — TELEPHONE (OUTPATIENT)
Dept: SURGERY | Facility: CLINIC | Age: 77
End: 2017-10-30

## 2017-10-30 RX ORDER — ONDANSETRON 4 MG/1
4 TABLET, FILM COATED ORAL EVERY 8 HOURS PRN
COMMUNITY
End: 2017-11-10

## 2017-10-30 NOTE — TELEPHONE ENCOUNTER
Sheri from [facility] calling to schedule kyphoplasty for pt r/t T-12 compression fx. Per Maria E Appiah, RN Pt had procedure scheduled that was previously cancelled r/t UTI. Pt has been cleared r/t UTI.  Current plan is for pt to by discharged from [facility] by No

## 2017-10-30 NOTE — PROGRESS NOTES
[address] [place] [address] Dr Simmons Author: Seth Mims MD     1940 MRN [id number]   [facility]  Admission 10/24/17      Last Hospital Discharge 10/24/17 PCP Rosie Crawford 15 of Discharge 10/24/17       Date of Admission found to have urinary tract infection was treated. Patient was positive for MDR E coli and reports no urinary complaints at this time. Patient has control in her back pain right now. Patient's recent UA was negative.   Patient describes sciatica type of Tartrate-Timolol (COMBIGAN) 0.2-0.5 % Ophthalmic Solution Apply 1 drop to eye 2 (two) times daily.    Eflornithine HCl (VANIQA) 13.9 % External Cream APPLY TO AFFECTED AREA(S) TWO TIMES A DAY   Diclofenac Sodium (VOLTAREN) 1 % Transdermal Gel APPLY A SMALL surgical history (5/7/13); up gi endoscopy,ball dil,30mm (6/12/14); skin surgery (2/27/12); skin surgery (2-26-14); skin surgery (10/7/14); and skin surgery (Left, 11/19/15).     She family history includes Cancer in her mother; Heart Disorder in her brothe and spasm. Lumbar back: She exhibits decreased range of motion, tenderness, pain and spasm. Lymphadenopathy:     She has no cervical adenopathy. Neurological: She is alert and oriented to person, place, and time. She has normal reflexes.  She dis pain.  History of broken vertebra,  fall    uncontrolled back pain. Patient was  recently discharged from hospital last saturday due to having a fractured  vertebrae,  fall in the bathtub.  Patient reports she feels like there is  more than \"one break in m HISTORY: (As transcribed by Technologist)   Back pain. History of broken vertebra,  fall    uncontrolled back pain. Patient was  recently discharged from hospital last saturday due to having a fractured  vertebrae,  fall in the bathtub.  Patient reports sh having  excruciating pain. Patient reports to having urinary incontinence due to  not being able to hold urine as long as she could.  Patient was discharged  with norco for pain control, last administration was at 0500, patient was  medicated with 80mcg fen vertebral body now with approximately 50% loss of vertebral body height DISC SPACES:  Mild disc space narrowing and lower thoracic vertebral body. PARASPINOUS:  Calcified abdominal aorta.       CONCLUSION:  Slight interval progression in degree of T12 super mm low density nodules within the thyroid gland. LYMPH NODES:  No pathological-appearing or enlarged lymph nodes. SKULL BASE:  Foramina are symmetric without bony erosion. VASCULATURE:  Limited views are unremarkable.   BONES:  No significant osseous lesi vertebral body. At T12-L1 however there is a mild diffuse disc bulge without significant central canal or neural foraminal stenosis. L1-L2:  No significant central canal or neural foraminal stenosis is present at this level.  L2-L3:  Mild diffuse disc bulge Retropulsion measuring up to 5 mm is also unchanged. . There is no cord compromise or central canal  stenosis, with the thecal sac measuring 11 mm AP in the midline.   LUMBAR DISC LEVELS L1-L2:  No significant disc/facet abnormality, spinal stenosis, or fora Disc spaces are maintained throughout the lumbar spine. Marrow signal is heterogeneous. The conus is at L1-L2. The visualized portion of the spinal cord is of normal caliber without focal signal abnormality.  T12-L1: Minimal disc bulge with facet hypertroph progressed. DISC SPACES:  Normal.  No significant disc height narrowing, subluxation, or endplate abnormality. PARASPINOUS:  Negative. No paraspinous abnormality is seen. OTHER:  Negative.        CONCLUSION:  There appears to have been slight interval prog of C6 vertebral body height. This is likely chronic in nature. Disc spaces are overall maintained in the cervical spine. Marrow signal is unremarkable. No enhancing lesions.   CERVICAL DISC LEVELS: C2-C3:  Central posterior osteophyte without spinal [place] yo female presenting to [facility] s/p compression fracture of T12 with high risk for falls, intractable back pain    (M45.9) Ankylosing spondylitis, unspecified site of spine ([address]  (primary encounter diagnosis)  Plan: pain control, PT/OT, Kyphoplasty per Dr Jose Aguirre

## 2017-10-30 NOTE — TELEPHONE ENCOUNTER
Claudia Wade from [facility], after confirming pt has not been seen by Dr. Maeve Woods. Pt will need to be seen in this office as a New Patient prior to kyphoplasty scheduling.

## 2017-11-01 ENCOUNTER — SNF VISIT (OUTPATIENT)
Dept: INTERNAL MEDICINE CLINIC | Age: 77
End: 2017-11-01

## 2017-11-01 ENCOUNTER — LAB ENCOUNTER (OUTPATIENT)
Dept: LAB | Age: 77
End: 2017-11-01
Attending: FAMILY MEDICINE
Payer: MEDICARE

## 2017-11-01 VITALS
HEART RATE: 81 BPM | RESPIRATION RATE: 20 BRPM | TEMPERATURE: 99 F | OXYGEN SATURATION: 94 % | SYSTOLIC BLOOD PRESSURE: 171 MMHG | DIASTOLIC BLOOD PRESSURE: 65 MMHG

## 2017-11-01 DIAGNOSIS — I10 ESSENTIAL HYPERTENSION: ICD-10-CM

## 2017-11-01 DIAGNOSIS — I10 HTN (HYPERTENSION): Primary | ICD-10-CM

## 2017-11-01 DIAGNOSIS — S22.080A T12 COMPRESSION FRACTURE (HCC): Primary | ICD-10-CM

## 2017-11-01 DIAGNOSIS — M54.9 INTRACTABLE BACK PAIN: ICD-10-CM

## 2017-11-01 PROCEDURE — 36415 COLL VENOUS BLD VENIPUNCTURE: CPT

## 2017-11-01 PROCEDURE — 99309 SBSQ NF CARE MODERATE MDM 30: CPT | Performed by: NURSE PRACTITIONER

## 2017-11-01 PROCEDURE — 85025 COMPLETE CBC W/AUTO DIFF WBC: CPT

## 2017-11-01 PROCEDURE — 80053 COMPREHEN METABOLIC PANEL: CPT

## 2017-11-01 RX ORDER — HYDRALAZINE HYDROCHLORIDE 25 MG/1
25 TABLET, FILM COATED ORAL EVERY 6 HOURS PRN
COMMUNITY
End: 2017-11-10

## 2017-11-01 RX ORDER — LISINOPRIL 10 MG/1
10 TABLET ORAL DAILY
COMMUNITY
End: 2017-11-10

## 2017-11-01 NOTE — PROGRESS NOTES
Asad Escobar, 8/20/1940, 76year old, female    Chief Complaint:  Patient presents with: Follow - Up: Intractable back pain  Derm Problem  HTN       Subjective:  PMH significant for Glaucoma, ankylosing spondylitis, RA, HTN, GERD, and bronchitis.   In S wheezing  CARDIOVASCULAR: S1, S2 normal, RRR; no S3, no S4; , no click, no murmur  ABDOMEN:  normal active BS+, soft, nondistended; no organomegaly, no masses; no bruits; nontender, no guarding, no rebound tenderness.   :Deferred  LYMPHATIC:no lymphedema supp; give today  3. Colace 100 mg BID  4. Miralax 17 gm daily     S/P recent eye surgery/Glaucoma  1. Combigan 0.2/0.5% gtts to left eye BID  2. F/U w/ Dr Albino Purvis     Left jaw pain/decreased ROM  1.  May use invisalign appliance at [facility]     Right 3rd

## 2017-11-02 ENCOUNTER — HOSPITAL ENCOUNTER (OUTPATIENT)
Dept: INTERVENTIONAL RADIOLOGY/VASCULAR | Facility: HOSPITAL | Age: 77
Discharge: NURSING, REHAB OR LONG-TERM CARE | End: 2017-11-02
Attending: FAMILY MEDICINE | Admitting: FAMILY MEDICINE
Payer: MEDICARE

## 2017-11-02 VITALS
HEIGHT: 67 IN | SYSTOLIC BLOOD PRESSURE: 164 MMHG | OXYGEN SATURATION: 95 % | WEIGHT: 164 LBS | BODY MASS INDEX: 25.74 KG/M2 | RESPIRATION RATE: 12 BRPM | TEMPERATURE: 99 F | HEART RATE: 77 BPM | DIASTOLIC BLOOD PRESSURE: 50 MMHG

## 2017-11-02 DIAGNOSIS — S22.000A THORACIC COMPRESSION FRACTURE (HCC): ICD-10-CM

## 2017-11-02 PROCEDURE — 0PU43JZ SUPPLEMENT THORACIC VERTEBRA WITH SYNTHETIC SUBSTITUTE, PERCUTANEOUS APPROACH: ICD-10-PCS | Performed by: RADIOLOGY

## 2017-11-02 PROCEDURE — 0PS43ZZ REPOSITION THORACIC VERTEBRA, PERCUTANEOUS APPROACH: ICD-10-PCS | Performed by: RADIOLOGY

## 2017-11-02 PROCEDURE — 99152 MOD SED SAME PHYS/QHP 5/>YRS: CPT

## 2017-11-02 PROCEDURE — 22513 PERQ VERTEBRAL AUGMENTATION: CPT

## 2017-11-02 PROCEDURE — 99153 MOD SED SAME PHYS/QHP EA: CPT

## 2017-11-02 RX ORDER — SODIUM CHLORIDE 9 MG/ML
INJECTION, SOLUTION INTRAVENOUS CONTINUOUS
Status: DISCONTINUED | OUTPATIENT
Start: 2017-11-02 | End: 2017-11-02

## 2017-11-02 RX ORDER — MIDAZOLAM HYDROCHLORIDE 1 MG/ML
INJECTION INTRAMUSCULAR; INTRAVENOUS
Status: COMPLETED
Start: 2017-11-02 | End: 2017-11-02

## 2017-11-02 RX ORDER — LIDOCAINE HYDROCHLORIDE 10 MG/ML
INJECTION, SOLUTION INFILTRATION; PERINEURAL
Status: COMPLETED
Start: 2017-11-02 | End: 2017-11-02

## 2017-11-02 RX ORDER — ONDANSETRON 2 MG/ML
INJECTION INTRAMUSCULAR; INTRAVENOUS
Status: COMPLETED
Start: 2017-11-02 | End: 2017-11-02

## 2017-11-02 RX ADMIN — SODIUM CHLORIDE: 9 INJECTION, SOLUTION INTRAVENOUS at 08:00:00

## 2017-11-02 RX ADMIN — ONDANSETRON 4 MG: 2 INJECTION INTRAMUSCULAR; INTRAVENOUS at 11:45:00

## 2017-11-02 NOTE — PLAN OF CARE
Post kyphoplasty with Dr. Henry Foley.  Dressing is clean, dry and intact. Site is soft with no hematoma noted. Patient has a hard time bearing weight, up with assist.  Complains of chronic back pain, feels a little relief from the procedure.   Patient had no diff

## 2017-11-03 ENCOUNTER — SNF VISIT (OUTPATIENT)
Dept: INTERNAL MEDICINE CLINIC | Age: 77
End: 2017-11-03

## 2017-11-03 ENCOUNTER — LAB REQUISITION (OUTPATIENT)
Dept: LAB | Facility: HOSPITAL | Age: 77
End: 2017-11-03
Attending: PHYSICAL MEDICINE & REHABILITATION
Payer: MEDICARE

## 2017-11-03 VITALS
RESPIRATION RATE: 20 BRPM | DIASTOLIC BLOOD PRESSURE: 74 MMHG | SYSTOLIC BLOOD PRESSURE: 154 MMHG | HEART RATE: 76 BPM | TEMPERATURE: 98 F | OXYGEN SATURATION: 97 %

## 2017-11-03 DIAGNOSIS — S22.080A T12 COMPRESSION FRACTURE (HCC): Primary | ICD-10-CM

## 2017-11-03 DIAGNOSIS — I10 ESSENTIAL HYPERTENSION: ICD-10-CM

## 2017-11-03 DIAGNOSIS — R22.40 LOCALIZED SWELLING, MASS, OR LUMP OF LOWER EXTREMITY: ICD-10-CM

## 2017-11-03 DIAGNOSIS — M19.90 ARTHRITIS: ICD-10-CM

## 2017-11-03 PROCEDURE — 84550 ASSAY OF BLOOD/URIC ACID: CPT | Performed by: PHYSICAL MEDICINE & REHABILITATION

## 2017-11-03 PROCEDURE — 99309 SBSQ NF CARE MODERATE MDM 30: CPT | Performed by: NURSE PRACTITIONER

## 2017-11-03 NOTE — PROGRESS NOTES
Mary Rose, 8/20/1940, 76year old, female    Chief Complaint:  Patient presents with: Follow - Up: Intractable back pain  Finger Pain  HTN       Subjective:  PMH significant for Glaucoma, ankylosing spondylitis, RA, HTN, GERD, and bronchitis.   In [place] place  EXTREMITIES/VASCULAR:no cyanosis, clubbing or edema, radial pulses 2+ and dorsalis pedal pulses 2+  NEUROLOGIC: intact; no sensorimotor deficit, cranial nerves intact II-XII, follows commands  PSYCHIATRIC: alert and oriented x 3; affect appropriate; days.

## 2017-11-06 ENCOUNTER — SNF VISIT (OUTPATIENT)
Dept: INTERNAL MEDICINE CLINIC | Age: 77
End: 2017-11-06

## 2017-11-06 VITALS
BODY MASS INDEX: 24 KG/M2 | TEMPERATURE: 98 F | WEIGHT: 150.19 LBS | DIASTOLIC BLOOD PRESSURE: 63 MMHG | SYSTOLIC BLOOD PRESSURE: 133 MMHG | OXYGEN SATURATION: 90 % | HEART RATE: 75 BPM

## 2017-11-06 DIAGNOSIS — M45.9 ANKYLOSING SPONDYLITIS, UNSPECIFIED SITE OF SPINE (HCC): ICD-10-CM

## 2017-11-06 DIAGNOSIS — Z98.890 S/P KYPHOPLASTY: ICD-10-CM

## 2017-11-06 DIAGNOSIS — M54.9 INTRACTABLE BACK PAIN: ICD-10-CM

## 2017-11-06 DIAGNOSIS — S22.080A T12 COMPRESSION FRACTURE (HCC): Primary | ICD-10-CM

## 2017-11-06 PROCEDURE — 99308 SBSQ NF CARE LOW MDM 20: CPT | Performed by: NURSE PRACTITIONER

## 2017-11-06 RX ORDER — PREDNISONE 20 MG/1
40 TABLET ORAL DAILY
COMMUNITY
Start: 2017-11-03 | End: 2017-11-07

## 2017-11-06 NOTE — PROGRESS NOTES
Asad Escobar, 8/20/1940, 76year old, female    Chief Complaint:  Patient presents with:   Follow - Up: s/p kyphoplasty for intactible back pain  Finger Pain       Subjective:  PMH significant for Glaucoma, ankylosing spondylitis, RA, HTN, GERD, and bron rebound tenderness.   :Deferred  LYMPHATIC:no lymphedema  MUSCULOSKELETAL: no acute synovitis upper or lower extremity  EXTREMITIES/VASCULAR:no cyanosis, clubbing or edema, radial pulses 2+ and dorsalis pedal pulses 2+  NEUROLOGIC: intact; no sensorimotor

## 2017-11-07 ENCOUNTER — LAB REQUISITION (OUTPATIENT)
Dept: LAB | Facility: HOSPITAL | Age: 77
End: 2017-11-07
Attending: PHYSICAL MEDICINE & REHABILITATION
Payer: MEDICARE

## 2017-11-07 DIAGNOSIS — N39.0 URINARY TRACT INFECTION: ICD-10-CM

## 2017-11-07 PROCEDURE — 81003 URINALYSIS AUTO W/O SCOPE: CPT | Performed by: PHYSICAL MEDICINE & REHABILITATION

## 2017-11-07 PROCEDURE — 87086 URINE CULTURE/COLONY COUNT: CPT | Performed by: PHYSICAL MEDICINE & REHABILITATION

## 2017-11-08 ENCOUNTER — LAB ENCOUNTER (OUTPATIENT)
Dept: LAB | Age: 77
End: 2017-11-08
Attending: PHYSICAL MEDICINE & REHABILITATION
Payer: MEDICARE

## 2017-11-08 ENCOUNTER — SNF DISCHARGE (OUTPATIENT)
Dept: INTERNAL MEDICINE CLINIC | Age: 77
End: 2017-11-08

## 2017-11-08 VITALS
HEART RATE: 73 BPM | TEMPERATURE: 99 F | DIASTOLIC BLOOD PRESSURE: 69 MMHG | SYSTOLIC BLOOD PRESSURE: 145 MMHG | RESPIRATION RATE: 18 BRPM | OXYGEN SATURATION: 97 %

## 2017-11-08 DIAGNOSIS — I10 HTN (HYPERTENSION): Primary | ICD-10-CM

## 2017-11-08 DIAGNOSIS — K59.03 DRUG-INDUCED CONSTIPATION: ICD-10-CM

## 2017-11-08 DIAGNOSIS — Z98.890 S/P KYPHOPLASTY: ICD-10-CM

## 2017-11-08 DIAGNOSIS — K21.9 GASTROESOPHAGEAL REFLUX DISEASE WITHOUT ESOPHAGITIS: ICD-10-CM

## 2017-11-08 DIAGNOSIS — S22.080A T12 COMPRESSION FRACTURE (HCC): Primary | ICD-10-CM

## 2017-11-08 DIAGNOSIS — I10 ESSENTIAL HYPERTENSION: ICD-10-CM

## 2017-11-08 DIAGNOSIS — M54.9 INTRACTABLE BACK PAIN: ICD-10-CM

## 2017-11-08 DIAGNOSIS — J40 BRONCHITIS: ICD-10-CM

## 2017-11-08 DIAGNOSIS — M19.90 ARTHRITIS: ICD-10-CM

## 2017-11-08 DIAGNOSIS — E78.49 OTHER HYPERLIPIDEMIA: ICD-10-CM

## 2017-11-08 DIAGNOSIS — E55.9 VITAMIN D DEFICIENCY: ICD-10-CM

## 2017-11-08 PROCEDURE — 36415 COLL VENOUS BLD VENIPUNCTURE: CPT

## 2017-11-08 PROCEDURE — 99316 NF DSCHRG MGMT 30 MIN+: CPT | Performed by: NURSE PRACTITIONER

## 2017-11-08 PROCEDURE — 80053 COMPREHEN METABOLIC PANEL: CPT

## 2017-11-08 PROCEDURE — 85025 COMPLETE CBC W/AUTO DIFF WBC: CPT

## 2017-11-08 NOTE — PROGRESS NOTES
Dwaine Hung, 8/20/1940, 76year old, female is being discharged from Facility: [address]    Date of Admission:   10.24.17    Date of Discharge:  Anticipated on 11.9.17                                 Admitting Diag distress  LINES, TUBES, DRAINS:  none  SKIN: no rashes, no suspicious lesions, pale, warm, dry  WOUND:   Kyphoplasty site:  healed  Right 3rd finger:  +DIP joint remains faintly pink, skin intact, no drainage, no discrete tenderness to palpation, trace paco CA 9.2 11/08/2017   ALKPHO 103 11/08/2017   AST 11 (L) 11/08/2017   ALT 14 11/08/2017   BILT 0.4 11/08/2017   TP 6.2 11/08/2017   ALB 2.4 (L) 11/08/2017   AGRATIO 2.5 09/15/2015    11/08/2017   K 4.2 11/08/2017    11/08/2017   CO2 28.0 11/08/ discharge.     Sherren Games, APN  11/8/2017  11:25 AM

## 2017-11-13 ENCOUNTER — APPOINTMENT (OUTPATIENT)
Dept: GENERAL RADIOLOGY | Facility: HOSPITAL | Age: 77
End: 2017-11-13
Attending: EMERGENCY MEDICINE
Payer: MEDICARE

## 2017-11-13 ENCOUNTER — APPOINTMENT (OUTPATIENT)
Dept: MRI IMAGING | Facility: HOSPITAL | Age: 77
End: 2017-11-13
Attending: EMERGENCY MEDICINE
Payer: MEDICARE

## 2017-11-13 ENCOUNTER — HOSPITAL ENCOUNTER (OUTPATIENT)
Facility: HOSPITAL | Age: 77
Setting detail: OBSERVATION
Discharge: HOME HEALTH | End: 2017-11-17
Attending: EMERGENCY MEDICINE | Admitting: HOSPITALIST
Payer: MEDICARE

## 2017-11-13 DIAGNOSIS — M54.9 INTRACTABLE BACK PAIN: Primary | ICD-10-CM

## 2017-11-13 DIAGNOSIS — S32.019A CLOSED FRACTURE OF FIRST LUMBAR VERTEBRA, UNSPECIFIED FRACTURE MORPHOLOGY, INITIAL ENCOUNTER (HCC): ICD-10-CM

## 2017-11-13 PROBLEM — D64.9 ANEMIA: Status: ACTIVE | Noted: 2017-11-13

## 2017-11-13 PROBLEM — R79.89 AZOTEMIA: Status: ACTIVE | Noted: 2017-11-13

## 2017-11-13 PROBLEM — R73.9 HYPERGLYCEMIA: Status: ACTIVE | Noted: 2017-11-13

## 2017-11-13 PROBLEM — E87.6 HYPOKALEMIA: Status: ACTIVE | Noted: 2017-11-13

## 2017-11-13 PROCEDURE — 72148 MRI LUMBAR SPINE W/O DYE: CPT | Performed by: EMERGENCY MEDICINE

## 2017-11-13 PROCEDURE — 99285 EMERGENCY DEPT VISIT HI MDM: CPT

## 2017-11-13 PROCEDURE — 74020 XR ABDOMEN, OBSTRUCTIVE SERIES (CPT=74020): CPT | Performed by: EMERGENCY MEDICINE

## 2017-11-13 PROCEDURE — 80053 COMPREHEN METABOLIC PANEL: CPT | Performed by: EMERGENCY MEDICINE

## 2017-11-13 PROCEDURE — 96376 TX/PRO/DX INJ SAME DRUG ADON: CPT

## 2017-11-13 PROCEDURE — 96375 TX/PRO/DX INJ NEW DRUG ADDON: CPT

## 2017-11-13 PROCEDURE — 85025 COMPLETE CBC W/AUTO DIFF WBC: CPT | Performed by: EMERGENCY MEDICINE

## 2017-11-13 PROCEDURE — 96374 THER/PROPH/DIAG INJ IV PUSH: CPT

## 2017-11-13 RX ORDER — HYDROMORPHONE HYDROCHLORIDE 1 MG/ML
0.5 INJECTION, SOLUTION INTRAMUSCULAR; INTRAVENOUS; SUBCUTANEOUS EVERY 30 MIN PRN
Status: DISCONTINUED | OUTPATIENT
Start: 2017-11-13 | End: 2017-11-13

## 2017-11-13 RX ORDER — SODIUM CHLORIDE 9 MG/ML
INJECTION, SOLUTION INTRAVENOUS CONTINUOUS
Status: ACTIVE | OUTPATIENT
Start: 2017-11-13 | End: 2017-11-13

## 2017-11-13 RX ORDER — ONDANSETRON 4 MG/1
4 TABLET, FILM COATED ORAL EVERY 8 HOURS PRN
Status: DISCONTINUED | OUTPATIENT
Start: 2017-11-13 | End: 2017-11-17

## 2017-11-13 RX ORDER — HYDROMORPHONE HYDROCHLORIDE 1 MG/ML
1 INJECTION, SOLUTION INTRAMUSCULAR; INTRAVENOUS; SUBCUTANEOUS EVERY 2 HOUR PRN
Status: DISCONTINUED | OUTPATIENT
Start: 2017-11-13 | End: 2017-11-17

## 2017-11-13 RX ORDER — TIMOLOL MALEATE 5 MG/ML
1 SOLUTION/ DROPS OPHTHALMIC 2 TIMES DAILY
Status: DISCONTINUED | OUTPATIENT
Start: 2017-11-13 | End: 2017-11-15 | Stop reason: ALTCHOICE

## 2017-11-13 RX ORDER — HYDROCODONE BITARTRATE AND ACETAMINOPHEN 10; 325 MG/1; MG/1
1-2 TABLET ORAL EVERY 4 HOURS PRN
Status: DISCONTINUED | OUTPATIENT
Start: 2017-11-13 | End: 2017-11-17

## 2017-11-13 RX ORDER — BRIMONIDINE TARTRATE 2 MG/ML
1 SOLUTION/ DROPS OPHTHALMIC 3 TIMES DAILY
Status: DISCONTINUED | OUTPATIENT
Start: 2017-11-13 | End: 2017-11-15 | Stop reason: ALTCHOICE

## 2017-11-13 RX ORDER — ONDANSETRON 2 MG/ML
4 INJECTION INTRAMUSCULAR; INTRAVENOUS EVERY 6 HOURS PRN
Status: DISCONTINUED | OUTPATIENT
Start: 2017-11-13 | End: 2017-11-17

## 2017-11-13 RX ORDER — HYDRALAZINE HYDROCHLORIDE 25 MG/1
25 TABLET, FILM COATED ORAL EVERY 6 HOURS PRN
Status: DISCONTINUED | OUTPATIENT
Start: 2017-11-13 | End: 2017-11-17

## 2017-11-13 RX ORDER — HYDROMORPHONE HYDROCHLORIDE 1 MG/ML
0.5 INJECTION, SOLUTION INTRAMUSCULAR; INTRAVENOUS; SUBCUTANEOUS ONCE
Status: COMPLETED | OUTPATIENT
Start: 2017-11-13 | End: 2017-11-13

## 2017-11-13 RX ORDER — MORPHINE SULFATE 4 MG/ML
4 INJECTION, SOLUTION INTRAMUSCULAR; INTRAVENOUS EVERY 30 MIN PRN
Status: DISCONTINUED | OUTPATIENT
Start: 2017-11-13 | End: 2017-11-14

## 2017-11-13 RX ORDER — AMLODIPINE BESYLATE 5 MG/1
5 TABLET ORAL DAILY
Status: DISCONTINUED | OUTPATIENT
Start: 2017-11-14 | End: 2017-11-17

## 2017-11-13 RX ORDER — LISINOPRIL 10 MG/1
10 TABLET ORAL DAILY
Status: DISCONTINUED | OUTPATIENT
Start: 2017-11-14 | End: 2017-11-17

## 2017-11-13 RX ORDER — TROLAMINE SALICYLATE 10 G/100G
CREAM TOPICAL 4 TIMES DAILY
Status: DISCONTINUED | OUTPATIENT
Start: 2017-11-13 | End: 2017-11-17

## 2017-11-13 RX ORDER — ONDANSETRON 2 MG/ML
4 INJECTION INTRAMUSCULAR; INTRAVENOUS ONCE
Status: COMPLETED | OUTPATIENT
Start: 2017-11-13 | End: 2017-11-13

## 2017-11-13 RX ORDER — SULFASALAZINE 500 MG/1
1000 TABLET, DELAYED RELEASE ORAL 2 TIMES DAILY
Status: DISCONTINUED | OUTPATIENT
Start: 2017-11-13 | End: 2017-11-17

## 2017-11-13 RX ORDER — HYDROMORPHONE HYDROCHLORIDE 1 MG/ML
0.5 INJECTION, SOLUTION INTRAMUSCULAR; INTRAVENOUS; SUBCUTANEOUS EVERY 2 HOUR PRN
Status: DISCONTINUED | OUTPATIENT
Start: 2017-11-13 | End: 2017-11-17

## 2017-11-13 RX ORDER — CYCLOBENZAPRINE HCL 10 MG
10 TABLET ORAL 3 TIMES DAILY PRN
Status: DISCONTINUED | OUTPATIENT
Start: 2017-11-13 | End: 2017-11-17

## 2017-11-13 RX ORDER — POLYETHYLENE GLYCOL 3350 17 G/17G
17 POWDER, FOR SOLUTION ORAL DAILY
Status: DISCONTINUED | OUTPATIENT
Start: 2017-11-13 | End: 2017-11-14

## 2017-11-13 RX ORDER — POTASSIUM CHLORIDE 20 MEQ/1
40 TABLET, EXTENDED RELEASE ORAL EVERY 4 HOURS
Status: DISCONTINUED | OUTPATIENT
Start: 2017-11-13 | End: 2017-11-13

## 2017-11-13 RX ORDER — DOCUSATE SODIUM 100 MG/1
100 CAPSULE, LIQUID FILLED ORAL 2 TIMES DAILY
Status: DISCONTINUED | OUTPATIENT
Start: 2017-11-13 | End: 2017-11-17

## 2017-11-13 RX ORDER — ACETAMINOPHEN 325 MG/1
650 TABLET ORAL EVERY 6 HOURS PRN
Status: DISCONTINUED | OUTPATIENT
Start: 2017-11-13 | End: 2017-11-17

## 2017-11-13 RX ORDER — ALBUTEROL SULFATE 90 UG/1
2 AEROSOL, METERED RESPIRATORY (INHALATION) EVERY 6 HOURS PRN
Status: DISCONTINUED | OUTPATIENT
Start: 2017-11-13 | End: 2017-11-14

## 2017-11-13 RX ORDER — ATORVASTATIN CALCIUM 10 MG/1
10 TABLET, FILM COATED ORAL NIGHTLY
Status: DISCONTINUED | OUTPATIENT
Start: 2017-11-13 | End: 2017-11-17

## 2017-11-13 RX ORDER — TACROLIMUS 0.3 MG/G
OINTMENT TOPICAL 2 TIMES DAILY
Status: DISCONTINUED | OUTPATIENT
Start: 2017-11-13 | End: 2017-11-14

## 2017-11-13 NOTE — ED INITIAL ASSESSMENT (HPI)
Pt had back surgery 2-weeks ago at hospital, Pt has follow-up appointment today, PT got up to go and due to pain Pt unable to make appointment and called 911, EMS admin 2x doses of Fentanyl en route w/o any pain relief

## 2017-11-13 NOTE — ED NOTES
Pt lying on cart, appears comfortable and in no distress. Pt states her pain has increased, but is hesitant to take any additional narcotic medication at this time due to little improvement in pain.

## 2017-11-13 NOTE — ED NOTES
Pt noted to have oxygen sat of 90% at this time. Pt awake and alert, answering all questions appropriately. Pt to be placed on oxygen at this time.

## 2017-11-13 NOTE — ED PROVIDER NOTES
Patient Seen in: [facility] Emergency Department    History   Patient presents with:  Back Pain (musculoskeletal)    Stated Complaint: back pain    HPI    Patient is a 40-year-old with a history of ankylosing spondylitis, collagenous colitis, rheuma Esophageal reflux    • Glaucoma    • High blood pressure    • Indeterminate PPD     Father had TB, seen by Dr. 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 Extremities: No edema. Neuro: Cranial nerves II through XII are intact bilaterally. Sensation intact in all extremities.   Patient has pain with any ac subarticular zones bilaterally. No spinal canal or neural foraminal stenosis. L5-S1:  Minimal diffuse disc bulge with bilateral facet hypertrophy.  No spinal canal or neural foraminal stenosis.     PARASPINAL AREA:  Normal with no visible mass.    BONY STR 11/13/2017 Yes    Hypokalemia E87.6 11/13/2017 Yes    Intractable back pain M54.9 11/13/2017 Unknown

## 2017-11-13 NOTE — ED NOTES
Pt repositioned on cart for comfort. Pt states that her back pain is normally 3-4/10, and is now a 7. [place] roll placed under legs for comfort.

## 2017-11-14 PROCEDURE — 84132 ASSAY OF SERUM POTASSIUM: CPT | Performed by: HOSPITALIST

## 2017-11-14 PROCEDURE — 96366 THER/PROPH/DIAG IV INF ADDON: CPT

## 2017-11-14 PROCEDURE — 96365 THER/PROPH/DIAG IV INF INIT: CPT

## 2017-11-14 RX ORDER — LIDOCAINE 50 MG/G
1 PATCH TOPICAL DAILY
Status: DISCONTINUED | OUTPATIENT
Start: 2017-11-14 | End: 2017-11-17

## 2017-11-14 RX ORDER — BRIMONIDINE TARTRATE 2 MG/ML
1 SOLUTION/ DROPS OPHTHALMIC 3 TIMES DAILY
Status: DISCONTINUED | OUTPATIENT
Start: 2017-11-14 | End: 2017-11-14

## 2017-11-14 RX ORDER — POLYETHYLENE GLYCOL 3350 17 G/17G
17 POWDER, FOR SOLUTION ORAL DAILY
Status: DISCONTINUED | OUTPATIENT
Start: 2017-11-14 | End: 2017-11-17

## 2017-11-14 RX ORDER — BISACODYL 10 MG
10 SUPPOSITORY, RECTAL RECTAL
Status: DISCONTINUED | OUTPATIENT
Start: 2017-11-14 | End: 2017-11-17

## 2017-11-14 RX ORDER — PSEUDOEPHEDRINE HCL 30 MG
100 TABLET ORAL 2 TIMES DAILY
Status: DISCONTINUED | OUTPATIENT
Start: 2017-11-14 | End: 2017-11-14

## 2017-11-14 NOTE — HOME CARE LIAISON
PTNT CURRENT WITH [facility] EOE  12/11/17  WILL NEED A ANGELIQUE ORDER ON OR BEFORE D/C    THANKS  Christal Hayes

## 2017-11-14 NOTE — PHYSICAL THERAPY NOTE
Attempted to see pt today for PT eval. Pt stated there is no way she is getting up out of the bed until they decide what to do for her compression fx. Will attempt again as schedule allows. Thank you.

## 2017-11-14 NOTE — PLAN OF CARE
PAIN - ADULT    • Verbalizes/displays adequate comfort level or patient's stated pain goal Progressing        SAFETY ADULT - FALL    • Free from fall injury Progressing        Admitted from ER per cart, alert and oriented .  C/o lower back pain which got wo

## 2017-11-14 NOTE — ED NOTES
Pt made comfortable on cart. Pt still c/o discomfort to back but remains hesitant to take any additional pain medication. Pt repositioned for comfort. Aware MRI results should be back shortly and will reevaluate pain at that time.

## 2017-11-14 NOTE — PLAN OF CARE
PAIN - ADULT    • Verbalizes/displays adequate comfort level or patient's stated pain goal Progressing        SAFETY ADULT - FALL    • Free from fall injury Progressing          Pain controlled with PRN Norco. Pain more severe when HOB elevated or ambulati

## 2017-11-14 NOTE — PAYOR COMM NOTE
--------------  Ward Lim  (MR # [id number])   Order Place in observation Once [ADT12] (Order [id number])   Order Requisition   Place in observation Once (Order #[id number]) on 11/13/17        Question Answer   Diagnosis Intractable back pain   Level of Diagnosis Date   • Ankylosing spondylitis ([facility]    • Arteritis, unspecified ([facility]     Left    • Cholelithiasis    • Collagenous colitis 2/10   • Diffuse cystic mastopathy    • Disorder of bone and cartilage, unspecified     osteopenia   • Esophageal refl deformity of the L1 vertebral body. There is minimal retropulsion of approximately 2 mm. Vertebral body hemangioma noted within the L2 vertebral body. L1 vertebral body demonstrates 6 mm of retropulsion which appears stable.     Obstructive series: No free

## 2017-11-14 NOTE — H&P
.  CC: Patient presents with:  Back Pain (musculoskeletal)       PCP: Mariajose Almaguer MD    History of Present Illness: Patient is a 76year old female with PMH sig for ankylosing spondylitis on humira and HTN who presents with back pain that originally st COLONOSCOPY,DIAGNOSTIC      Comment: mild inflammatory change sigmoid colon  2/3/10: COLONOSCOPY,DIAGNOSTIC      Comment: Dr. Criss Tolentino, mild erythema of the rectosigmoid,               possibly a variant of normal, bx show                collagenous colitis, diclofenac, bx for H.                pylori was negative  1990: UPPER GI ENDOSCOPY,DIAGNOSIS      Comment: wnl     ALL:    Actonel [Risedronat*        Comment:Stopped 4/2013 due to side effects  Milk                      Proton Pump Inhibit*    Diarrhea Disp: 1 Bottle Rfl: 6   Diclofenac Sodium (VOLTAREN) 1 % Transdermal Gel APPLY A SMALL AMOUNT TO THE KNEE AND ANKLE UP TO 4 TIMES PER DAY. (UP TO 4 GRAMS PER AREA MAX OF 32 GRAMS PER DAY) Disp: 100 g Rfl: 5   [facility] OR Take  by mouth.  Disp:  Rfl: 1413   ALT  20   AST  19   ALB  2.6*       No results for input(s): TROP in the last 72 hours. Radiology:   MRI spine 11/13--CONCLUSION:    #1. Interval development of L1 vertebral body fracture.   #2. Stable T12 vertebral body fracture with 6 mm retro

## 2017-11-14 NOTE — ED NOTES
Pt given apple juice and saltines. Spouse remains at bedside. Awaiting MRI results and pt to be admitted.

## 2017-11-14 NOTE — PLAN OF CARE
Pt placed on electrolyte protocol for K = 3.5 . Unable to tolerate oral potassium replacement , started on IV KCl . Cold packs applied to decrease irritation/pain on site . (+) blood return, flushing good . Will continue to monitor .

## 2017-11-15 ENCOUNTER — APPOINTMENT (OUTPATIENT)
Dept: INTERVENTIONAL RADIOLOGY/VASCULAR | Facility: HOSPITAL | Age: 77
End: 2017-11-15
Attending: HOSPITALIST
Payer: MEDICARE

## 2017-11-15 PROCEDURE — 97162 PT EVAL MOD COMPLEX 30 MIN: CPT

## 2017-11-15 PROCEDURE — 0QU03JZ SUPPLEMENT LUMBAR VERTEBRA WITH SYNTHETIC SUBSTITUTE, PERCUTANEOUS APPROACH: ICD-10-PCS | Performed by: RADIOLOGY

## 2017-11-15 PROCEDURE — 97116 GAIT TRAINING THERAPY: CPT

## 2017-11-15 PROCEDURE — 0QS03ZZ REPOSITION LUMBAR VERTEBRA, PERCUTANEOUS APPROACH: ICD-10-PCS | Performed by: RADIOLOGY

## 2017-11-15 PROCEDURE — 22514 PERQ VERTEBRAL AUGMENTATION: CPT

## 2017-11-15 PROCEDURE — 99152 MOD SED SAME PHYS/QHP 5/>YRS: CPT

## 2017-11-15 PROCEDURE — 99153 MOD SED SAME PHYS/QHP EA: CPT

## 2017-11-15 RX ORDER — MIDAZOLAM HYDROCHLORIDE 1 MG/ML
INJECTION INTRAMUSCULAR; INTRAVENOUS
Status: DISCONTINUED
Start: 2017-11-15 | End: 2017-11-15 | Stop reason: WASHOUT

## 2017-11-15 RX ORDER — LIDOCAINE HYDROCHLORIDE 10 MG/ML
INJECTION, SOLUTION INFILTRATION; PERINEURAL
Status: COMPLETED
Start: 2017-11-15 | End: 2017-11-15

## 2017-11-15 RX ORDER — MIDAZOLAM HYDROCHLORIDE 1 MG/ML
INJECTION INTRAMUSCULAR; INTRAVENOUS
Status: COMPLETED
Start: 2017-11-15 | End: 2017-11-15

## 2017-11-15 RX ORDER — CEFAZOLIN SODIUM 1 G/3ML
INJECTION, POWDER, FOR SOLUTION INTRAMUSCULAR; INTRAVENOUS
Status: COMPLETED
Start: 2017-11-15 | End: 2017-11-15

## 2017-11-15 NOTE — PROCEDURES
[facility]  Procedure Note    Hemant Do Patient Status:  Observation    1940 MRN [id number]   [facility] 3SW-A Attending Amber Bell MD   Hosp Day # 0 PCP Jose A Powell MD     Procedure: L1 kyphoplasty    Pre-Procedure D

## 2017-11-15 NOTE — PROGRESS NOTES
Rayshawn Briones Hospitalist note    PCP: Andrey Carrion MD    Chief Complaint:  F/u new compression fracture. SUBJECTIVE:  Underwent kyphoplasty this morning. Has not been out of bed yet, cannot tell if there is a difference in her pain. Afebrile. No sob. lisinopril  10 mg Oral Daily   • atorvastatin  10 mg Oral Nightly   • sulfaSALAzine  1,000 mg Oral BID       bisacodyl, magnesium hydroxide, HYDROmorphone HCl PF **OR** HYDROmorphone HCl PF, ondansetron HCl, bisacodyl, acetaminophen, hydrALAzine HCl, HYDRO

## 2017-11-15 NOTE — PLAN OF CARE
PAIN - ADULT    • Verbalizes/displays adequate comfort level or patient's stated pain goal Progressing        SAFETY ADULT - FALL    • Free from fall injury Progressing        Alert and oriented . Improved pain relief verbalized at this time .  lidoderm pat

## 2017-11-15 NOTE — PHYSICAL THERAPY NOTE
PHYSICAL THERAPY EVALUATION - INPATIENT     Room Number: 707/600-X  Evaluation Date: 11/15/2017  Type of Evaluation: Initial  Physician Order: PT Eval and Treat    Presenting Problem: s/p L1 kyphoplasty 11/15/17  Reason for Therapy: Mobility Dysfunctio APPENDECTOMY  No date: BIOPSY OF BREAST, INCISIONAL      Comment: L Breast  2005: CHOLECYSTECTOMY  2/13/06: COLONOSCOPY,BIOPSY      Comment: nonspecific erythema transverse colon (bx                marked acute & chronic colitis), ileum wnl  1990: Yohana Rose 18-20 mm, Performed by Derick Clark at                [address] [place] [address], Performed by                Derick Clark at [address] [place] [address]  2/10: UPPER GI ENDOSCOPY,BIOPSY      Comment: 3 gastric ulcers from diclofenac, bx for currently have. ..  -   Turning over in bed (including adjusting bedclothes, sheets and blankets)?: None   -   Sitting down on and standing up from a chair with arms (e.g., wheelchair, bedside commode, etc.): A Little   -   Moving from lying on back to sitt kyphoplasty 11/15/17. Pertinent comorbidities and personal factors impacting therapy include recent h/o spine procedure. In this PT evaluation, the patient presents with the following impairments back pain 4/10, dec strength, balance, activity tolerance.

## 2017-11-15 NOTE — PLAN OF CARE
Patient down to IR for kyphoplasty via transport at approx 0740. PCT assisted patient with contacting spouse prior to leaving the unit.

## 2017-11-16 PROCEDURE — 97166 OT EVAL MOD COMPLEX 45 MIN: CPT

## 2017-11-16 PROCEDURE — 97535 SELF CARE MNGMENT TRAINING: CPT

## 2017-11-16 PROCEDURE — 97530 THERAPEUTIC ACTIVITIES: CPT

## 2017-11-16 PROCEDURE — 97116 GAIT TRAINING THERAPY: CPT

## 2017-11-16 NOTE — PLAN OF CARE
Patient returned to unit via bed from IR s/p kyphoplasty- dressing clean dry and intact. Will maintain flat bedrest for two hours per orders. Patient aware and in agreement. Will resume diet.

## 2017-11-16 NOTE — CM/SW NOTE
Discussed pt during daily rounds. PT recommending home health therapy at discharge. Per RN Diana, pt likely to be cleared for discharge later today. [facility] referral sent to [facility] home health to resume home nursing and PT.

## 2017-11-16 NOTE — OCCUPATIONAL THERAPY NOTE
OCCUPATIONAL THERAPY EVALUATION - INPATIENT     Room Number: 588/038-F  Evaluation Date: 11/16/2017  Type of Evaluation: Initial  Presenting Problem: Kyphoplasty    Physician Order: IP Consult to Occupational Therapy  Reason for Therapy: ADL/IADL Dysfuncti variant of normal, bx show                collagenous colitis, ileum normal  No date: HEMORRHOIDECTOMY,INT/EXT,SIMPLE  No date: NEEDLE BIOPSY LEFT      Comment: pt cannot remember  No date: NEEDLE BIOPSY RIGHT      Comment: pt cannot remember  2005: OTHER level  Lives With: Spouse    Toilet and Equipment: Standard height toilet  Shower/Tub and Equipment: Walk-in shower  Other Equipment: None    Occupation/Status: retired  Hand Dominance: Right  Drives: Yes  Patient Regularly Uses: Glasses    Prior Level of which includes using toilet, bedpan or urinal? : A Lot  -   Putting on and taking off regular upper body clothing?: A Little  -   Taking care of personal grooming such as brushing teeth?: A Little  -   Eating meals?: None    AM-PAC Score:  Score: 16  Appro impacting engagement in ADL/IADL MODERATE  3 - 5 performance deficits   Client Assessment/Performance Deficits MODERATE - Comorbidities and min to mod modifications of tasks    Clinical Decision Making MODERATE - Analysis of occupational profile, detailed

## 2017-11-16 NOTE — PHYSICAL THERAPY NOTE
PHYSICAL THERAPY TREATMENT NOTE - INPATIENT    Room Number: 110/340-T     Session: 1   Number of Visits to Meet Established Goals: 4    Presenting Problem: s/p L1 kyphoplasty 11/15/17  History related to current admission:  From Dr Angelica Weathers admit note: [facility] CHOLECYSTECTOMY  2/13/06: COLONOSCOPY,BIOPSY      Comment: nonspecific erythema transverse colon (bx                marked acute & chronic colitis), ileum wnl  1990: COLONOSCOPY,DIAGNOSTIC      Comment: mild inflammatory change sigmoid colon  2/3/10: COLON Larry, Performed by                Janene Francois at [address] [place] [address]  2/10: UPPER GI ENDOSCOPY,BIOPSY      Comment: 3 gastric ulcers from diclofenac, bx for H.                pylori was negative  1990: UPPER GI ENDOSCOPY,DIAGNOSIS      C sitting up in bs chair, spouse present. Pt educated in goals for session. Pt able to recite 3/3 spine precautions and able to adhere to them with all fxal mobility.  Pt stood to rw with supervision, amb with rw 100' with supervision with gait training emp

## 2017-11-17 VITALS
SYSTOLIC BLOOD PRESSURE: 116 MMHG | OXYGEN SATURATION: 97 % | RESPIRATION RATE: 17 BRPM | HEART RATE: 72 BPM | DIASTOLIC BLOOD PRESSURE: 45 MMHG | TEMPERATURE: 98 F

## 2017-11-17 PROCEDURE — 97535 SELF CARE MNGMENT TRAINING: CPT

## 2017-11-17 PROCEDURE — 97116 GAIT TRAINING THERAPY: CPT

## 2017-11-17 PROCEDURE — 96376 TX/PRO/DX INJ SAME DRUG ADON: CPT

## 2017-11-17 PROCEDURE — 97530 THERAPEUTIC ACTIVITIES: CPT

## 2017-11-17 RX ORDER — LIDOCAINE 50 MG/G
1 PATCH TOPICAL DAILY
Qty: 5 PATCH | Refills: 0 | Status: SHIPPED | OUTPATIENT
Start: 2017-11-18 | End: 2017-12-19

## 2017-11-17 RX ORDER — CYCLOBENZAPRINE HCL 10 MG
10 TABLET ORAL 3 TIMES DAILY PRN
Qty: 15 TABLET | Refills: 0 | Status: SHIPPED | OUTPATIENT
Start: 2017-11-17 | End: 2017-11-22

## 2017-11-17 RX ORDER — HYDROCODONE BITARTRATE AND ACETAMINOPHEN 10; 325 MG/1; MG/1
1-2 TABLET ORAL EVERY 4 HOURS PRN
Qty: 84 TABLET | Refills: 0 | Status: ON HOLD | COMMUNITY
Start: 2017-11-17 | End: 2017-12-08

## 2017-11-17 NOTE — PLAN OF CARE
Patient ready to discharge home today. Verbal and written discharge instructions reviewed with patient and spouse, verbalized understanding. Rx for norco, flexeril, and lidocaine patch given to patient.   PT discharged home via wheelchair with belongings

## 2017-11-17 NOTE — CM/SW NOTE
11/17/17 1538   Discharge disposition   Discharged to: Home-Health   Name of [id number]   Discharge transportation Private car

## 2017-11-17 NOTE — PHYSICAL THERAPY NOTE
PHYSICAL THERAPY TREATMENT NOTE - INPATIENT    Room Number: 253/784-T     Session: 2   Number of Visits to Meet Established Goals: 4    Presenting Problem: s/p L1 kyphoplasty 11/15/17  History related to current admission: Mary Yin admit note: [facility] CHOLECYSTECTOMY  2/13/06: COLONOSCOPY,BIOPSY      Comment: nonspecific erythema transverse colon (bx                marked acute & chronic colitis), ileum wnl  1990: COLONOSCOPY,DIAGNOSTIC      Comment: mild inflammatory change sigmoid colon  2/3/10: COLON Larry, Performed by                Clair Scott at [address] [place] [address]  2/10: UPPER GI ENDOSCOPY,BIOPSY      Comment: 3 gastric ulcers from diclofenac, bx for H.                pylori was negative  1990: UPPER GI ENDOSCOPY,DIAGNOSIS      C railing with cga    Skilled Therapy Provided: Pt semi-reclined in bed, agreeable to PT treatment. Pt recalls 3/3 spinal precautions. Pt instructed in log roll technique for supine to sit, pt completes with supervision.  Pt completes sit to stand with superv demonstrate supine - sit EOB @ level: supervision MET with Mod I 11/17/17   Goal #2 Patient is able to demonstrate transfers Sit to/from [facility] assistance level: supervision, met 11/16/17   Goal #3 Patient is able to ambulate 300 feet with assist device:

## 2017-11-17 NOTE — OCCUPATIONAL THERAPY NOTE
OCCUPATIONAL THERAPY TREATMENT NOTE - INPATIENT     Room Number: 036/998-A  Session: 1   Number of Visits to Meet Established Goals: 5    Presenting Problem: Kyphoplasty    History related to current admission: Pt is a 76year old female admit on 11/13/201 HEMORRHOIDECTOMY,INT/EXT,SIMPLE  No date: NEEDLE BIOPSY LEFT      Comment: pt cannot remember  No date: NEEDLE BIOPSY RIGHT      Comment: pt cannot remember  2005: OTHER AMBL SURG      Comment: R leg tendon surgery with post-op wound                infecti PAIN ASSESSMENT  Ratin  Location: back  Management Techniques:  Activity promotion     ACTIVITY TOLERANCE  Shortness of breath    ACTIVITIES OF DAILY LIVING ASSESSMENT  AM-PAC ‘6-Clicks’ Inpatient Daily Activity Short Form  How much help from training;UE strengthening/ROM; Functional transfer training  Rehab Potential : Good  Frequency (Obs): 5x/week      OT Goals: progressing 11/17/17  ADL Goals   Patient will perform all ADLs: with supervision     Functional Transfer Goals  Patient will perfor

## 2017-11-17 NOTE — CM/SW NOTE
Pt is cleared for discharge today. AVS sent via [facility] to [facility]. Spoke with Latasha Ray at [facility], they have already spoken to pts spouse and they plan to see pt on Sunday.

## 2017-11-17 NOTE — PROGRESS NOTES
Marivel Mays Hospitalist note    PCP: Tata Wong MD    Chief Complaint:  F/u new compression fracture. SUBJECTIVE:  States that pain was better this morning but then worsened this afternoon.  She is anxious about returning home due to her recent setback Topical QID   • lisinopril  10 mg Oral Daily   • atorvastatin  10 mg Oral Nightly   • sulfaSALAzine  1,000 mg Oral BID       bisacodyl, magnesium hydroxide, HYDROmorphone HCl PF **OR** HYDROmorphone HCl PF, ondansetron HCl, bisacodyl, acetaminophen, hydrAL

## 2017-11-17 NOTE — DISCHARGE SUMMARY
[facility] Discharge Summary    Patient ID:  Kamla Merino  [id number]  52 year old  8/20/1940    Admit date: 11/13/2017  Discharge date and time: 11/17/17  Attending Physician: Saundra Ron MD  Primary Care Physician: Mary Ellen Adame MD several days. Pain located only in lower back, does not radiate down legs. No numbness/weakness in legs either. Hospital Course:   Pt had repeat MRI showing L1 fracture and she underwent kyphoplasty 11/15.  Pain control was achieved with norco 325/10, fl Gel  Commonly known as:  VOLTAREN  APPLY A SMALL AMOUNT TO THE KNEE AND ANKLE UP TO 4 TIMES PER DAY. (UP TO 4 GRAMS PER AREA MAX OF 32 GRAMS PER DAY)  Notes to patient:  For pain     docusate sodium 100 MG Caps  Commonly known as:  COLACE  Take 100 mg by m Date: 11/13/2017  PROCEDURE:  OBSTRUCTIVE SERIES  TECHNIQUE:  Supine and upright views of the abdomen and pelvis were obtained. COMPARISON:  MARV , XR ABDOMEN, OBSTRUCTIVE SERIES (CPT=74020), 10/11/2017, 18:56.   MARV , CT ABDOMEN+PELVIS(AMT=71925), 4/ reports she was only able to use it twice due to having  excruciating pain. Patient reports to having urinary incontinence due to  not being able to hold urine as long as she could.  Patient was discharged  with norco for pain control, last administration w patient was instructed to use back  brace, patient reports she was only able to use it twice due to having  excruciating pain. Patient reports to having urinary incontinence due to  not being able to hold urine as long as she could.  Patient was discharged free.     FINDINGS:    The osseous structures are demineralized. There is severe compression fracture deformity of T12 again noted with mild retropulsion into the spinal canal. No other thoracic spine fractures appreciated.  There are degenerative plate joey facet hypertrophy. No spinal canal or neural foraminal stenosis. PARASPINAL AREA:  Normal with no visible mass. BONY STRUCTURES:  Stable compression deformity of the T12 vertebral body status post kyphoplasty.  There is been interval compression deformity Stable mild disc bulging without spinal stenosis. Normal facet joints and neural foramina. L5-S1:  Stable mild disc bulging without spinal stenosis. Normal facet joints and neural foramina. PARASPINAL AREA:  Normal with no visible mass.   BONY STRUCTURES: process. The vertebral level indicated by MRI was L1. Utilizing the AP plane, the pedicles were identified at the L1 level.  Approximately 8 cc of 1% lidocaine was administered over the left L1 pedicle and a standard spinal trocar was utilized to access t Excellent cement filling of anterior two thirds of the T12 vertebral body from pedicle to pedicle and superior endplate to inferior endplate. CONCLUSION:  Successful kyphoplasty of osteoporotic pathologic compression fracture of T12 vertebral body. 10 cc of 1% lidocaine was administered over the left T12 pedicle and a scalpel utilized to make a small incision above the pedicle. A standard spinal trocar was utilized to access the vertebral body.  The medial border of the pedicle was never encroached un

## 2017-11-27 PROBLEM — M54.6 CHRONIC RIGHT-SIDED THORACIC BACK PAIN: Status: ACTIVE | Noted: 2017-11-27

## 2017-11-27 PROBLEM — G89.29 CHRONIC RIGHT-SIDED THORACIC BACK PAIN: Status: ACTIVE | Noted: 2017-11-27

## 2017-12-07 ENCOUNTER — APPOINTMENT (OUTPATIENT)
Dept: CT IMAGING | Facility: HOSPITAL | Age: 77
End: 2017-12-07
Attending: EMERGENCY MEDICINE
Payer: MEDICARE

## 2017-12-07 ENCOUNTER — HOSPITAL ENCOUNTER (OUTPATIENT)
Facility: HOSPITAL | Age: 77
Setting detail: OBSERVATION
Discharge: HOME | End: 2017-12-10
Attending: EMERGENCY MEDICINE | Admitting: INTERNAL MEDICINE
Payer: MEDICARE

## 2017-12-07 DIAGNOSIS — E87.6 HYPOKALEMIA: ICD-10-CM

## 2017-12-07 DIAGNOSIS — R19.7 DIARRHEA, UNSPECIFIED TYPE: ICD-10-CM

## 2017-12-07 DIAGNOSIS — R10.9 ABDOMINAL PAIN, ACUTE: Primary | ICD-10-CM

## 2017-12-07 DIAGNOSIS — R11.2 NAUSEA AND VOMITING, INTRACTABILITY OF VOMITING NOT SPECIFIED, UNSPECIFIED VOMITING TYPE: ICD-10-CM

## 2017-12-07 LAB
ALBUMIN SERPL-MCNC: 3.1 G/DL (ref 3.5–4.8)
ALP LIVER SERPL-CCNC: 106 U/L (ref 55–142)
ALT SERPL-CCNC: 19 U/L (ref 14–54)
APTT PPP: 26.9 SECONDS (ref 25–34)
AST SERPL-CCNC: 14 U/L (ref 15–41)
BASOPHILS # BLD AUTO: 0.02 X10(3) UL (ref 0–0.1)
BASOPHILS NFR BLD AUTO: 0.1 %
BILIRUB SERPL-MCNC: 0.4 MG/DL (ref 0.1–2)
BUN BLD-MCNC: 17 MG/DL (ref 8–20)
CALCIUM BLD-MCNC: 8.9 MG/DL (ref 8.3–10.3)
CHLORIDE: 104 MMOL/L (ref 101–111)
CO2: 26 MMOL/L (ref 22–32)
CREAT BLD-MCNC: 0.42 MG/DL (ref 0.55–1.02)
EOSINOPHIL # BLD AUTO: 0.02 X10(3) UL (ref 0–0.3)
EOSINOPHIL NFR BLD AUTO: 0.1 %
ERYTHROCYTE [DISTWIDTH] IN BLOOD BY AUTOMATED COUNT: 13.8 % (ref 11.5–16)
GLUCOSE BLD-MCNC: 125 MG/DL (ref 70–99)
HCT VFR BLD AUTO: 34.2 % (ref 34–50)
HGB BLD-MCNC: 11.5 G/DL (ref 12–16)
IMMATURE GRANULOCYTE COUNT: 0.04 X10(3) UL (ref 0–1)
IMMATURE GRANULOCYTE RATIO %: 0.3 %
INR BLD: 1.05 (ref 0.89–1.11)
LYMPHOCYTES # BLD AUTO: 1.22 X10(3) UL (ref 0.9–4)
LYMPHOCYTES NFR BLD AUTO: 9 %
M PROTEIN MFR SERPL ELPH: 6.7 G/DL (ref 6.1–8.3)
MCH RBC QN AUTO: 31 PG (ref 27–33.2)
MCHC RBC AUTO-ENTMCNC: 33.6 G/DL (ref 31–37)
MCV RBC AUTO: 92.2 FL (ref 81–100)
MONOCYTES # BLD AUTO: 0.69 X10(3) UL (ref 0.1–0.6)
MONOCYTES NFR BLD AUTO: 5.1 %
NEUTROPHIL ABS PRELIM: 11.64 X10 (3) UL (ref 1.3–6.7)
NEUTROPHILS # BLD AUTO: 11.64 X10(3) UL (ref 1.3–6.7)
NEUTROPHILS NFR BLD AUTO: 85.4 %
PLATELET # BLD AUTO: 282 10(3)UL (ref 150–450)
POTASSIUM SERPL-SCNC: 3.3 MMOL/L (ref 3.6–5.1)
PSA SERPL DL<=0.01 NG/ML-MCNC: 13.7 SECONDS (ref 12–14.3)
RBC # BLD AUTO: 3.71 X10(6)UL (ref 3.8–5.1)
RED CELL DISTRIBUTION WIDTH-SD: 47.1 FL (ref 35.1–46.3)
SODIUM SERPL-SCNC: 140 MMOL/L (ref 136–144)
WBC # BLD AUTO: 13.6 X10(3) UL (ref 4–13)

## 2017-12-07 PROCEDURE — 96376 TX/PRO/DX INJ SAME DRUG ADON: CPT

## 2017-12-07 PROCEDURE — 96374 THER/PROPH/DIAG INJ IV PUSH: CPT

## 2017-12-07 PROCEDURE — 80053 COMPREHEN METABOLIC PANEL: CPT | Performed by: EMERGENCY MEDICINE

## 2017-12-07 PROCEDURE — 99285 EMERGENCY DEPT VISIT HI MDM: CPT

## 2017-12-07 PROCEDURE — 85025 COMPLETE CBC W/AUTO DIFF WBC: CPT | Performed by: EMERGENCY MEDICINE

## 2017-12-07 PROCEDURE — 85730 THROMBOPLASTIN TIME PARTIAL: CPT | Performed by: EMERGENCY MEDICINE

## 2017-12-07 PROCEDURE — 96361 HYDRATE IV INFUSION ADD-ON: CPT

## 2017-12-07 PROCEDURE — 96375 TX/PRO/DX INJ NEW DRUG ADDON: CPT

## 2017-12-07 PROCEDURE — 74176 CT ABD & PELVIS W/O CONTRAST: CPT | Performed by: EMERGENCY MEDICINE

## 2017-12-07 PROCEDURE — 85610 PROTHROMBIN TIME: CPT | Performed by: EMERGENCY MEDICINE

## 2017-12-07 RX ORDER — SODIUM CHLORIDE 9 MG/ML
1000 INJECTION, SOLUTION INTRAVENOUS ONCE
Status: DISCONTINUED | OUTPATIENT
Start: 2017-12-07 | End: 2017-12-08

## 2017-12-07 RX ORDER — SODIUM CHLORIDE 9 MG/ML
INJECTION, SOLUTION INTRAVENOUS CONTINUOUS
Status: DISCONTINUED | OUTPATIENT
Start: 2017-12-07 | End: 2017-12-10

## 2017-12-07 RX ORDER — POTASSIUM CHLORIDE 20 MEQ/1
40 TABLET, EXTENDED RELEASE ORAL ONCE
Status: COMPLETED | OUTPATIENT
Start: 2017-12-08 | End: 2017-12-08

## 2017-12-07 RX ORDER — BRIMONIDINE TARTRATE 2 MG/ML
1 SOLUTION/ DROPS OPHTHALMIC 3 TIMES DAILY
Status: DISCONTINUED | OUTPATIENT
Start: 2017-12-07 | End: 2017-12-08

## 2017-12-07 RX ORDER — SULFASALAZINE 500 MG/1
1000 TABLET, DELAYED RELEASE ORAL 2 TIMES DAILY
Status: DISCONTINUED | OUTPATIENT
Start: 2017-12-07 | End: 2017-12-10

## 2017-12-07 RX ORDER — HEPARIN SODIUM 5000 [USP'U]/ML
5000 INJECTION, SOLUTION INTRAVENOUS; SUBCUTANEOUS EVERY 8 HOURS SCHEDULED
Status: DISCONTINUED | OUTPATIENT
Start: 2017-12-08 | End: 2017-12-10

## 2017-12-07 RX ORDER — LIDOCAINE 50 MG/G
1 PATCH TOPICAL DAILY
Status: DISCONTINUED | OUTPATIENT
Start: 2017-12-08 | End: 2017-12-10

## 2017-12-07 RX ORDER — SODIUM PHOSPHATE, DIBASIC AND SODIUM PHOSPHATE, MONOBASIC 7; 19 G/133ML; G/133ML
1 ENEMA RECTAL ONCE AS NEEDED
Status: DISCONTINUED | OUTPATIENT
Start: 2017-12-07 | End: 2017-12-10

## 2017-12-07 RX ORDER — TIZANIDINE 4 MG/1
4 TABLET ORAL EVERY 6 HOURS PRN
Status: DISCONTINUED | OUTPATIENT
Start: 2017-12-07 | End: 2017-12-10

## 2017-12-07 RX ORDER — LISINOPRIL 10 MG/1
10 TABLET ORAL DAILY
Status: DISCONTINUED | OUTPATIENT
Start: 2017-12-08 | End: 2017-12-10

## 2017-12-07 RX ORDER — SODIUM CHLORIDE 9 MG/ML
INJECTION, SOLUTION INTRAVENOUS CONTINUOUS
Status: DISCONTINUED | OUTPATIENT
Start: 2017-12-07 | End: 2017-12-08

## 2017-12-07 RX ORDER — AMLODIPINE BESYLATE 5 MG/1
5 TABLET ORAL
Status: DISCONTINUED | OUTPATIENT
Start: 2017-12-08 | End: 2017-12-10

## 2017-12-07 RX ORDER — ONDANSETRON 2 MG/ML
4 INJECTION INTRAMUSCULAR; INTRAVENOUS EVERY 4 HOURS PRN
Status: DISCONTINUED | OUTPATIENT
Start: 2017-12-07 | End: 2017-12-10

## 2017-12-07 RX ORDER — ATORVASTATIN CALCIUM 10 MG/1
10 TABLET, FILM COATED ORAL NIGHTLY
Status: DISCONTINUED | OUTPATIENT
Start: 2017-12-07 | End: 2017-12-10

## 2017-12-07 RX ORDER — SODIUM CHLORIDE 9 MG/ML
1000 INJECTION, SOLUTION INTRAVENOUS ONCE
Status: COMPLETED | OUTPATIENT
Start: 2017-12-07 | End: 2017-12-08

## 2017-12-07 RX ORDER — OXYCODONE AND ACETAMINOPHEN 10; 325 MG/1; MG/1
1 TABLET ORAL EVERY 6 HOURS PRN
Status: DISCONTINUED | OUTPATIENT
Start: 2017-12-07 | End: 2017-12-10

## 2017-12-07 RX ORDER — HYDROCODONE BITARTRATE AND ACETAMINOPHEN 10; 325 MG/1; MG/1
1-2 TABLET ORAL EVERY 4 HOURS PRN
Status: DISCONTINUED | OUTPATIENT
Start: 2017-12-07 | End: 2017-12-10

## 2017-12-07 RX ORDER — FAMOTIDINE 20 MG/1
40 TABLET ORAL DAILY
Status: DISCONTINUED | OUTPATIENT
Start: 2017-12-07 | End: 2017-12-08

## 2017-12-07 RX ORDER — ONDANSETRON 2 MG/ML
4 INJECTION INTRAMUSCULAR; INTRAVENOUS EVERY 6 HOURS PRN
Status: DISCONTINUED | OUTPATIENT
Start: 2017-12-07 | End: 2017-12-10

## 2017-12-07 RX ORDER — BISACODYL 10 MG
10 SUPPOSITORY, RECTAL RECTAL
Status: DISCONTINUED | OUTPATIENT
Start: 2017-12-07 | End: 2017-12-10

## 2017-12-07 RX ORDER — POLYETHYLENE GLYCOL 3350 17 G/17G
17 POWDER, FOR SOLUTION ORAL DAILY PRN
Status: DISCONTINUED | OUTPATIENT
Start: 2017-12-07 | End: 2017-12-10

## 2017-12-07 RX ORDER — POTASSIUM CHLORIDE 20 MEQ/1
20 TABLET, EXTENDED RELEASE ORAL ONCE
Status: COMPLETED | OUTPATIENT
Start: 2017-12-07 | End: 2017-12-07

## 2017-12-07 RX ORDER — METOCLOPRAMIDE HYDROCHLORIDE 5 MG/ML
10 INJECTION INTRAMUSCULAR; INTRAVENOUS EVERY 8 HOURS PRN
Status: DISCONTINUED | OUTPATIENT
Start: 2017-12-07 | End: 2017-12-10

## 2017-12-07 RX ORDER — DOCUSATE SODIUM 100 MG/1
100 CAPSULE, LIQUID FILLED ORAL 2 TIMES DAILY
Status: DISCONTINUED | OUTPATIENT
Start: 2017-12-07 | End: 2017-12-10

## 2017-12-07 RX ORDER — ONDANSETRON 2 MG/ML
4 INJECTION INTRAMUSCULAR; INTRAVENOUS ONCE
Status: COMPLETED | OUTPATIENT
Start: 2017-12-07 | End: 2017-12-07

## 2017-12-08 ENCOUNTER — SURGERY (OUTPATIENT)
Age: 77
End: 2017-12-08

## 2017-12-08 LAB
BASOPHILS # BLD AUTO: 0.01 X10(3) UL (ref 0–0.1)
BASOPHILS NFR BLD AUTO: 0.1 %
BUN BLD-MCNC: 13 MG/DL (ref 8–20)
CALCIUM BLD-MCNC: 8.1 MG/DL (ref 8.3–10.3)
CHLORIDE: 109 MMOL/L (ref 101–111)
CO2: 24 MMOL/L (ref 22–32)
CREAT BLD-MCNC: 0.24 MG/DL (ref 0.55–1.02)
DEPRECATED HBV CORE AB SER IA-ACNC: 261.6 NG/ML (ref 10–291)
EOSINOPHIL # BLD AUTO: 0.01 X10(3) UL (ref 0–0.3)
EOSINOPHIL NFR BLD AUTO: 0.1 %
ERYTHROCYTE [DISTWIDTH] IN BLOOD BY AUTOMATED COUNT: 13.8 % (ref 11.5–16)
GLUCOSE BLD-MCNC: 110 MG/DL (ref 70–99)
HCT VFR BLD AUTO: 30.8 % (ref 34–50)
HGB BLD-MCNC: 9.9 G/DL (ref 12–16)
IMMATURE GRANULOCYTE COUNT: 0.05 X10(3) UL (ref 0–1)
IMMATURE GRANULOCYTE RATIO %: 0.4 %
IRON SATURATION: 14 % (ref 13–45)
IRON: 32 UG/DL (ref 28–170)
LYMPHOCYTES # BLD AUTO: 1.36 X10(3) UL (ref 0.9–4)
LYMPHOCYTES NFR BLD AUTO: 11.1 %
MCH RBC QN AUTO: 31 PG (ref 27–33.2)
MCHC RBC AUTO-ENTMCNC: 32.1 G/DL (ref 31–37)
MCV RBC AUTO: 96.6 FL (ref 81–100)
MONOCYTES # BLD AUTO: 0.78 X10(3) UL (ref 0.1–0.6)
MONOCYTES NFR BLD AUTO: 6.4 %
NEUTROPHIL ABS PRELIM: 9.99 X10 (3) UL (ref 1.3–6.7)
NEUTROPHILS # BLD AUTO: 9.99 X10(3) UL (ref 1.3–6.7)
NEUTROPHILS NFR BLD AUTO: 81.9 %
PLATELET # BLD AUTO: 231 10(3)UL (ref 150–450)
POTASSIUM SERPL-SCNC: 3.3 MMOL/L (ref 3.6–5.1)
POTASSIUM SERPL-SCNC: 3.3 MMOL/L (ref 3.6–5.1)
RBC # BLD AUTO: 3.19 X10(6)UL (ref 3.8–5.1)
RED CELL DISTRIBUTION WIDTH-SD: 49.1 FL (ref 35.1–46.3)
SODIUM SERPL-SCNC: 140 MMOL/L (ref 136–144)
TOTAL IRON BINDING CAPACITY: 228 UG/DL (ref 298–536)
TRANSFERRIN: 153 MG/DL (ref 200–360)
WBC # BLD AUTO: 12.2 X10(3) UL (ref 4–13)

## 2017-12-08 PROCEDURE — 80048 BASIC METABOLIC PNL TOTAL CA: CPT | Performed by: HOSPITALIST

## 2017-12-08 PROCEDURE — 88305 TISSUE EXAM BY PATHOLOGIST: CPT | Performed by: INTERNAL MEDICINE

## 2017-12-08 PROCEDURE — 83550 IRON BINDING TEST: CPT | Performed by: NURSE PRACTITIONER

## 2017-12-08 PROCEDURE — 83540 ASSAY OF IRON: CPT | Performed by: NURSE PRACTITIONER

## 2017-12-08 PROCEDURE — 0DB98ZX EXCISION OF DUODENUM, VIA NATURAL OR ARTIFICIAL OPENING ENDOSCOPIC, DIAGNOSTIC: ICD-10-PCS | Performed by: INTERNAL MEDICINE

## 2017-12-08 PROCEDURE — 99152 MOD SED SAME PHYS/QHP 5/>YRS: CPT

## 2017-12-08 PROCEDURE — 82728 ASSAY OF FERRITIN: CPT | Performed by: NURSE PRACTITIONER

## 2017-12-08 PROCEDURE — 96376 TX/PRO/DX INJ SAME DRUG ADON: CPT

## 2017-12-08 PROCEDURE — 85025 COMPLETE CBC W/AUTO DIFF WBC: CPT | Performed by: HOSPITALIST

## 2017-12-08 PROCEDURE — 96375 TX/PRO/DX INJ NEW DRUG ADDON: CPT

## 2017-12-08 PROCEDURE — 0DB48ZX EXCISION OF ESOPHAGOGASTRIC JUNCTION, VIA NATURAL OR ARTIFICIAL OPENING ENDOSCOPIC, DIAGNOSTIC: ICD-10-PCS | Performed by: INTERNAL MEDICINE

## 2017-12-08 PROCEDURE — 0D748ZZ DILATION OF ESOPHAGOGASTRIC JUNCTION, VIA NATURAL OR ARTIFICIAL OPENING ENDOSCOPIC: ICD-10-PCS | Performed by: INTERNAL MEDICINE

## 2017-12-08 PROCEDURE — 0DB68ZX EXCISION OF STOMACH, VIA NATURAL OR ARTIFICIAL OPENING ENDOSCOPIC, DIAGNOSTIC: ICD-10-PCS | Performed by: INTERNAL MEDICINE

## 2017-12-08 PROCEDURE — 84132 ASSAY OF SERUM POTASSIUM: CPT | Performed by: HOSPITALIST

## 2017-12-08 PROCEDURE — 87493 C DIFF AMPLIFIED PROBE: CPT | Performed by: NURSE PRACTITIONER

## 2017-12-08 PROCEDURE — 96372 THER/PROPH/DIAG INJ SC/IM: CPT

## 2017-12-08 RX ORDER — PREDNISOLONE ACETATE 10 MG/ML
1 SUSPENSION/ DROPS OPHTHALMIC DAILY
Status: DISCONTINUED | OUTPATIENT
Start: 2017-12-09 | End: 2017-12-10

## 2017-12-08 RX ORDER — FAMOTIDINE 20 MG/1
40 TABLET ORAL 2 TIMES DAILY
Status: DISCONTINUED | OUTPATIENT
Start: 2017-12-08 | End: 2017-12-10

## 2017-12-08 RX ORDER — BRIMONIDINE TARTRATE 2 MG/ML
1 SOLUTION/ DROPS OPHTHALMIC 2 TIMES DAILY
Status: DISCONTINUED | OUTPATIENT
Start: 2017-12-08 | End: 2017-12-09 | Stop reason: ALTCHOICE

## 2017-12-08 RX ORDER — PREDNISOLONE ACETATE 10 MG/ML
1 SUSPENSION/ DROPS OPHTHALMIC DAILY
COMMUNITY
End: 2018-04-19 | Stop reason: ALTCHOICE

## 2017-12-08 RX ORDER — POTASSIUM CHLORIDE 20 MEQ/1
40 TABLET, EXTENDED RELEASE ORAL EVERY 4 HOURS
Status: DISPENSED | OUTPATIENT
Start: 2017-12-08 | End: 2017-12-08

## 2017-12-08 RX ORDER — HYDRALAZINE HYDROCHLORIDE 20 MG/ML
10 INJECTION INTRAMUSCULAR; INTRAVENOUS EVERY 6 HOURS PRN
Status: DISCONTINUED | OUTPATIENT
Start: 2017-12-08 | End: 2017-12-10

## 2017-12-08 RX ORDER — TIMOLOL MALEATE 5 MG/ML
1 SOLUTION/ DROPS OPHTHALMIC 2 TIMES DAILY
Status: DISCONTINUED | OUTPATIENT
Start: 2017-12-08 | End: 2017-12-09 | Stop reason: ALTCHOICE

## 2017-12-08 RX ORDER — MIDAZOLAM HYDROCHLORIDE 1 MG/ML
INJECTION INTRAMUSCULAR; INTRAVENOUS
Status: DISCONTINUED | OUTPATIENT
Start: 2017-12-08 | End: 2017-12-08 | Stop reason: HOSPADM

## 2017-12-08 NOTE — PLAN OF CARE
No antiemetic needed/requested since admission. PCT assisted pt up to bathroom. Reports pt had small loose stool, followed by large formed stool. Questioned pt about laxatives that she took prior to admission.   Reports \"I took 3 suppositories, an enema

## 2017-12-08 NOTE — CM/SW NOTE
12/08/17 1000   CM/SW Screening   Referral Source Social Work (self-referral)   [address] staff; Chart review   Patient's Mental Status Alert;Oriented   Patient's [address]   Number of Levels in Home 2   Patient lives with Spous

## 2017-12-08 NOTE — PROGRESS NOTES
/72  on admission; pt had been dry heaving. Recheck done with /51 HR 97. Order for hydralazine IV prn SBP > 170 obtained. Pt able to take po med now after reglan given at midnight as no longer dry heaving.

## 2017-12-08 NOTE — ED INITIAL ASSESSMENT (HPI)
Patient with nausea and vomiting since 0700, she also complains of constipation since this morning. She complains of lower abdominal pain.

## 2017-12-08 NOTE — PLAN OF CARE
NURSING ADMISSION NOTE      Patient admitted via cart. Oriented to room. Safety precautions initiated. Bed in low position. Call light in reach. Admitted to 322 from ER. Alert and oriented. Able to move by self from gurney to bed.   Admission jonn

## 2017-12-08 NOTE — CONSULTS
[facility]    Report of Consultation    Radha Quiroz Patient Status:  Observation    1940 MRN [id number]   [facility] 3NW-A Attending Betty Snell MD   Hosp Day # 0 PCP Ayaka Sofia MD     Date of Admission:  20 She typically has constipation attributed to long term use of opioid pain medications for a history of ankylosing spondylitis on Humira, compression fracture s/p T 12 kyphoplasty 11/2/17, s/p L1 kyphoplasty 11/15/17.  She reports recent changes in her opioi After the patient was interviewed and the procedure again discussed and questions addressed, the patient was brought to the GI Lab and monitoring of the B/P, pulse, and pulse oximetry was performed.  The patient was then placed in the left lateral decubitus    IMPRESSION:  Distal Esophageal Stricture likely related to acid reflux  Intolerance to PPI's     PLAN:  Continue present antireflux medications and measures and if biopsies are negative would follow expectantly.      Pernell Wu • Visual impairment     iritis       Past Surgical History  Past Surgical History:  No date: APPENDECTOMY  No date: BIOPSY OF BREAST, INCISIONAL      Comment: L Breast  2005: CHOLECYSTECTOMY  2/13/06: COLONOSCOPY,BIOPSY      Comment: nonspecific erythema t Comment: antral gastric erosions, distal esophageal                stricture, gastric bx, esophageal dilation                18-20 mm, Performed by LAN-Powerkary Vogel at                [address] [facility], Performed by                MARK GLEASON sulfaSALAzine (AZULFIDINE) EC tab 1,000 mg 1,000 mg Oral BID   TiZANidine HCl (ZANAFLEX) tab 4 mg 4 mg Oral Q6H PRN   lidocaine (LIDODERM) 5 % 1 patch 1 patch Transdermal Daily   0.9%  NaCl infusion  Intravenous Continuous   Heparin Sodium (Porcine) 5000 U lidocaine 5 % External Patch Place 1 patch onto the skin daily. lisinopril 10 MG Oral Tab Take 1 tablet (10 mg total) by mouth daily. simvastatin 20 MG Oral Tab Take 1 tablet (20 mg total) by mouth once daily.    hydrALAzine HCl 25 MG Oral Tab Take 1 ta Genitourinary: Denies kidney stones, painful/difficult urination, frequent urinary infections, frequent urination, blood in urine, incontinence, kidney failure.   Psychosocial: noncontributory  Neuro: no tremor, dysarthria, gait disturbance  Skin: Denies se TSH 3.646 05/09/2017   ESRML 87 (H) 10/07/2017   CRP 7.79 (H) 10/07/2017   MG 2.2 10/11/2017   PHOS 2.9 05/12/2016         Imaging:  Ct Abdomen+pelvis(cpt=74176)    Result Date: 12/7/2017  CONCLUSION:  1. Nonspecific presacral stranding noted.   This is ne PT presents s/p kyphoplasty with tapering doses of opioids over the last week, with hx of GERD / PUD / esophageal stricture, unable to tolerated  PPIs and on intermittent H2 blockers. Diclofenac use is daily.   Weight loss, anorexia, constipation with lax

## 2017-12-08 NOTE — DIETARY MALNUTRITION NOTE
[facility]    NUTRITION INITIAL ASSESSMENT    Pt meets moderate malnutrition criteria.     CRITERIA FOR MALNUTRITION DIAGNOSIS:  Criteria for non-severe malnutrition diagnosis: chronic illness related to wt loss 7.5% in 3 months and energy intake less Clears  Oral Supplements: Ensure Clear    FOOD/NUTRITION RELATED HISTORY:  Appetite: Poor  Intake: <75% > 1 month  Intake Meeting Needs: No  Food Allergies: dairy  Cultural/Ethnic/Sikh Preferences Addresses: Yes    NUTRITION RELATED PHYSICAL FINDINGS:

## 2017-12-08 NOTE — ED NOTES
Pt here w/ c/o nausea and vomiting. Pt states she vomited once this morning but has been \"wretching\" since. Pt has had ongoing nausea since her vertebrae breaks in October and on meds at home.  Pt also states she has had low abd pain and then states she h

## 2017-12-08 NOTE — BRIEF OP NOTE
Pre-Operative Diagnosis: nausea, vomiting     Post-Operative Diagnosis: hiatal hernia, schatzke's ring, gastroduodenitis     Procedure Performed:   Procedure(s):  ESOPHAGOGASTRODUODENOSCOPY wih biopsies and balloon dilation    Surgeon(s) and Role:     *

## 2017-12-08 NOTE — H&P
[facility] Patient Status:  Observation    1940 MRN [id number]   [facility] 3NW-A Attending Kirk Parnell MD   Hosp Day # 0 PCP Kingston Engel MD     Cc: nausea / vomiting    History and other inflammatory polyarthropathies     RA   • Visual impairment     iritis     Past Surgical History:  No date: APPENDECTOMY  No date: BIOPSY OF BREAST, INCISIONAL      Comment: L Breast  2005: CHOLECYSTECTOMY  2/13/06: COLONOSCOPY,BIOPSY      Commen Comment: antral gastric erosions, distal esophageal                stricture, gastric bx, esophageal dilation                18-20 mm, Performed by Catarino Andrews at                [facility], Performed by                Montey Cogan (six) hours as needed for Pain. for 30 days Disp: 120 tablet Rfl: 0    Diclofenac-Misoprostol 75-0.2 MG Oral Tab EC Take 1 tablet by mouth 2 (two) times daily.  Disp: 60 tablet Rfl: 0 Taking   [] Ondansetron HCl (ZOFRAN) 4 mg tablet Take 1 tablet (4 Rfl: 6 Taking   [facility] OR Take  by mouth. Disp:  Rfl:  Taking   Cholecalciferol (VITAMIN D) 1000 UNIT Oral Cap 1 CAPSULE TWICE DAILY Disp:  Rfl:  Taking         Review of Systems:    A comprehensive 10 point review of systems was completed.   Pertine yesterday. FINDINGS:  LUNG BASES:  Dependent atelectasis bilaterally. Small hiatal hernia. LIVER:  Normal in shape and contour but limited evaluation without contrast. BILIARY:   Cholecystectomy clips. . SPLEEN:  Normal.  No enlargement or focal lesion. IVF, suspect dilutional   - continue to follow CBC    # hypokalemia  - electrolyte protocol  - follow BMP    # Accelerated HTN  - better controlled after resuming home meds, continue to monitor    # Hyperlipidemia  - continue nightly statin    # Chronic pa balloon; hiatal hernia;  Severe gastroduodenitis with ulcers  -avoid NSAIDs, asa  -PPI  -check stool for c diff  -try to limit pain meds for back pain given above    D/w ECTOR Arevalo and Dr. Luan Ross MD  [facility] Hospitalist  [phone]  12/8/201

## 2017-12-08 NOTE — OPERATIVE REPORT
PATIENT NAME: Dyaanna Robins  MRN: [id number]  DATE OF OPERATION: 12/8/2017  REFERRING PHYSICIAN: Dr. Gerri Moreno  Medications:  Versed 6 mg IVP              Fentanyl 100 mcg IVP  PREOPERATIVE DIAGNOSIS   Dysphagia   Nausea and vomiting   Prior hx of PUD from ns dilation was noted as several small tears were noted in the Schatzki's ring. There was resistance to a 20 mm balloon. The gastroscope was removed from the patient. The procedure was completed. The patient tolerated the procedure well.    Total moderate

## 2017-12-08 NOTE — ED PROVIDER NOTES
Patient Seen in: [facility] Emergency Department    History   Patient presents with:  Nausea/Vomiting/Diarrhea (gastrointestinal)    Stated Complaint: N&V    HPI    Patient is a 49-year-old female who presents emergency room with a history of nausea COLONOSCOPY,BIOPSY      Comment: nonspecific erythema transverse colon (bx                marked acute & chronic colitis), ileum wnl  1990: COLONOSCOPY,DIAGNOSTIC      Comment: mild inflammatory change sigmoid colon  2/3/10: COLONOSCOPY,DIAGNOSTIC      Com [facility] Mays at [facility]  2/10: UPPER GI ENDOSCOPY,BIOPSY      Comment: 3 gastric ulcers from diclofenac, bx for H.                pylori was negative  1990: UPPER GI ENDOSCOPY,DIAGNOSIS      Comment: wnl        Smoking all 4 extremities. NEURO: Patient is awake, alert and oriented to time place and person. Motor strength is 5 over 5 in all 4 extremities. There are no gross motor or sensory deficits appreciated. Cranial nerves II through XII are intact.   Patient is answe Sharri Dixon MD on 12/07/2017 at 20:32     Approved by: Carolyn Brown MD            Patient had an IV line established and blood work drawn including a CBC, chemistries, BUN and creatinine, and blood sugar which shows evidence of potassium 3.3 which the patimiguelito

## 2017-12-09 LAB
BASOPHILS # BLD AUTO: 0.02 X10(3) UL (ref 0–0.1)
BASOPHILS NFR BLD AUTO: 0.2 %
BILIRUB UR QL STRIP.AUTO: NEGATIVE
BUN BLD-MCNC: 6 MG/DL (ref 8–20)
CALCIUM BLD-MCNC: 8.3 MG/DL (ref 8.3–10.3)
CHLORIDE: 110 MMOL/L (ref 101–111)
CLARITY UR REFRACT.AUTO: CLEAR
CO2: 25 MMOL/L (ref 22–32)
COLOR UR AUTO: YELLOW
CREAT BLD-MCNC: 0.29 MG/DL (ref 0.55–1.02)
EOSINOPHIL # BLD AUTO: 0.06 X10(3) UL (ref 0–0.3)
EOSINOPHIL NFR BLD AUTO: 0.7 %
ERYTHROCYTE [DISTWIDTH] IN BLOOD BY AUTOMATED COUNT: 14.3 % (ref 11.5–16)
ERYTHROCYTE [DISTWIDTH] IN BLOOD BY AUTOMATED COUNT: 14.3 % (ref 11.5–16)
GLUCOSE BLD-MCNC: 129 MG/DL (ref 70–99)
GLUCOSE UR STRIP.AUTO-MCNC: NEGATIVE MG/DL
HAV IGM SER QL: 1.7 MG/DL (ref 1.7–3)
HCT VFR BLD AUTO: 32.6 % (ref 34–50)
HCT VFR BLD AUTO: 32.6 % (ref 34–50)
HGB BLD-MCNC: 10.2 G/DL (ref 12–16)
HGB BLD-MCNC: 10.2 G/DL (ref 12–16)
IMMATURE GRANULOCYTE COUNT: 0.04 X10(3) UL (ref 0–1)
IMMATURE GRANULOCYTE RATIO %: 0.5 %
KETONES UR STRIP.AUTO-MCNC: NEGATIVE MG/DL
LEUKOCYTE ESTERASE UR QL STRIP.AUTO: NEGATIVE
LYMPHOCYTES # BLD AUTO: 1.72 X10(3) UL (ref 0.9–4)
LYMPHOCYTES NFR BLD AUTO: 20.4 %
MCH RBC QN AUTO: 30.6 PG (ref 27–33.2)
MCH RBC QN AUTO: 30.6 PG (ref 27–33.2)
MCHC RBC AUTO-ENTMCNC: 31.3 G/DL (ref 31–37)
MCHC RBC AUTO-ENTMCNC: 31.3 G/DL (ref 31–37)
MCV RBC AUTO: 97.9 FL (ref 81–100)
MCV RBC AUTO: 97.9 FL (ref 81–100)
MONOCYTES # BLD AUTO: 0.67 X10(3) UL (ref 0.1–0.6)
MONOCYTES NFR BLD AUTO: 7.9 %
NEUTROPHIL ABS PRELIM: 5.93 X10 (3) UL (ref 1.3–6.7)
NEUTROPHILS # BLD AUTO: 5.93 X10(3) UL (ref 1.3–6.7)
NEUTROPHILS NFR BLD AUTO: 70.3 %
NITRITE UR QL STRIP.AUTO: NEGATIVE
PH UR STRIP.AUTO: 5 [PH] (ref 4.5–8)
PLATELET # BLD AUTO: 224 10(3)UL (ref 150–450)
PLATELET # BLD AUTO: 224 10(3)UL (ref 150–450)
POTASSIUM SERPL-SCNC: 3.1 MMOL/L (ref 3.6–5.1)
POTASSIUM SERPL-SCNC: 3.1 MMOL/L (ref 3.6–5.1)
PROT UR STRIP.AUTO-MCNC: NEGATIVE MG/DL
RBC # BLD AUTO: 3.33 X10(6)UL (ref 3.8–5.1)
RBC # BLD AUTO: 3.33 X10(6)UL (ref 3.8–5.1)
RBC UR QL AUTO: NEGATIVE
RED CELL DISTRIBUTION WIDTH-SD: 51.1 FL (ref 35.1–46.3)
RED CELL DISTRIBUTION WIDTH-SD: 51.1 FL (ref 35.1–46.3)
SODIUM SERPL-SCNC: 140 MMOL/L (ref 136–144)
SP GR UR STRIP.AUTO: 1.01 (ref 1–1.03)
UROBILINOGEN UR STRIP.AUTO-MCNC: <2 MG/DL
WBC # BLD AUTO: 8.4 X10(3) UL (ref 4–13)
WBC # BLD AUTO: 8.4 X10(3) UL (ref 4–13)

## 2017-12-09 PROCEDURE — 85027 COMPLETE CBC AUTOMATED: CPT | Performed by: INTERNAL MEDICINE

## 2017-12-09 PROCEDURE — 96372 THER/PROPH/DIAG INJ SC/IM: CPT

## 2017-12-09 PROCEDURE — 85025 COMPLETE CBC W/AUTO DIFF WBC: CPT | Performed by: INTERNAL MEDICINE

## 2017-12-09 PROCEDURE — 84132 ASSAY OF SERUM POTASSIUM: CPT | Performed by: INTERNAL MEDICINE

## 2017-12-09 PROCEDURE — 96366 THER/PROPH/DIAG IV INF ADDON: CPT

## 2017-12-09 PROCEDURE — 80048 BASIC METABOLIC PNL TOTAL CA: CPT | Performed by: PHYSICIAN ASSISTANT

## 2017-12-09 PROCEDURE — 83735 ASSAY OF MAGNESIUM: CPT | Performed by: INTERNAL MEDICINE

## 2017-12-09 PROCEDURE — 96376 TX/PRO/DX INJ SAME DRUG ADON: CPT

## 2017-12-09 PROCEDURE — 96365 THER/PROPH/DIAG IV INF INIT: CPT

## 2017-12-09 PROCEDURE — 81003 URINALYSIS AUTO W/O SCOPE: CPT | Performed by: INTERNAL MEDICINE

## 2017-12-09 RX ORDER — POTASSIUM CHLORIDE 14.9 MG/ML
20 INJECTION INTRAVENOUS ONCE
Status: COMPLETED | OUTPATIENT
Start: 2017-12-09 | End: 2017-12-09

## 2017-12-09 RX ORDER — MAGNESIUM SULFATE HEPTAHYDRATE 40 MG/ML
2 INJECTION, SOLUTION INTRAVENOUS ONCE
Status: COMPLETED | OUTPATIENT
Start: 2017-12-09 | End: 2017-12-09

## 2017-12-09 RX ORDER — PROCHLORPERAZINE MALEATE 10 MG
10 TABLET ORAL EVERY 6 HOURS PRN
Status: DISCONTINUED | OUTPATIENT
Start: 2017-12-09 | End: 2017-12-10

## 2017-12-09 NOTE — PLAN OF CARE
METABOLIC/FLUID AND ELECTROLYTES - ADULT    • Electrolytes maintained within normal limits Progressing        PAIN - ADULT    • Verbalizes/displays adequate comfort level or patient's stated pain goal Progressing        PT RESTING IN BED, EASY NON LABORED

## 2017-12-09 NOTE — PROGRESS NOTES
[facility]  Progress Note    Wendy Anand Patient Status:  Observation    1940 MRN [id number]   [facility] 3NW-A Attending Michael Medellin MD   Hosp Day # 0 PCP Kate Ochoa MD     Subjective:  Feeling tired.   No furthe (COLACE) cap 100 mg, 100 mg, Oral, BID  •  PEG 3350 (MIRALAX) powder packet 17 g, 17 g, Oral, Daily PRN  •  magnesium hydroxide (MILK OF MAGNESIA) 400 MG/5ML suspension 30 mL, 30 mL, Oral, Daily PRN  •  bisacodyl (DULCOLAX) rectal suppository 10 mg, 10 mg, cannot remember  No date: NEEDLE BIOPSY RIGHT      Comment: pt cannot remember  2005: OTHER AMBL SURG      Comment: R leg tendon surgery with post-op wound                infection  No date: OTHER SURGICAL HISTORY      Comment: right ankle surgery  5/7/13: 108   Temp 99.4 °F (37.4 °C) (Oral)   Resp 18   Wt 145 lb 1 oz (65.8 kg)   SpO2 95%   BMI 22.72 kg/m²   GENERAL: well developed, well nourished, in no apparent distress  HEENT: atraumatic, normocephalic, ears and throat are clear  NECK: supple, no adenopat

## 2017-12-09 NOTE — PROGRESS NOTES
Pt reports nausea & having dry heaves after am meds given. Request remainder of am meds be given @ later time. zofran given as ordered / requested.

## 2017-12-10 VITALS
TEMPERATURE: 99 F | SYSTOLIC BLOOD PRESSURE: 152 MMHG | OXYGEN SATURATION: 94 % | DIASTOLIC BLOOD PRESSURE: 50 MMHG | BODY MASS INDEX: 23 KG/M2 | WEIGHT: 145.06 LBS | RESPIRATION RATE: 20 BRPM | HEART RATE: 77 BPM

## 2017-12-10 LAB
BUN BLD-MCNC: 5 MG/DL (ref 8–20)
CALCIUM BLD-MCNC: 7.9 MG/DL (ref 8.3–10.3)
CHLORIDE: 111 MMOL/L (ref 101–111)
CO2: 25 MMOL/L (ref 22–32)
CREAT BLD-MCNC: 0.27 MG/DL (ref 0.55–1.02)
ERYTHROCYTE [DISTWIDTH] IN BLOOD BY AUTOMATED COUNT: 14 % (ref 11.5–16)
GLUCOSE BLD-MCNC: 97 MG/DL (ref 70–99)
HAV IGM SER QL: 2 MG/DL (ref 1.7–3)
HCT VFR BLD AUTO: 29.7 % (ref 34–50)
HGB BLD-MCNC: 9.3 G/DL (ref 12–16)
MCH RBC QN AUTO: 30.7 PG (ref 27–33.2)
MCHC RBC AUTO-ENTMCNC: 31.3 G/DL (ref 31–37)
MCV RBC AUTO: 98 FL (ref 81–100)
PLATELET # BLD AUTO: 191 10(3)UL (ref 150–450)
POTASSIUM SERPL-SCNC: 3.3 MMOL/L (ref 3.6–5.1)
RBC # BLD AUTO: 3.03 X10(6)UL (ref 3.8–5.1)
RED CELL DISTRIBUTION WIDTH-SD: 50.4 FL (ref 35.1–46.3)
SODIUM SERPL-SCNC: 142 MMOL/L (ref 136–144)
WBC # BLD AUTO: 6.6 X10(3) UL (ref 4–13)

## 2017-12-10 PROCEDURE — 96372 THER/PROPH/DIAG INJ SC/IM: CPT

## 2017-12-10 PROCEDURE — 83735 ASSAY OF MAGNESIUM: CPT | Performed by: INTERNAL MEDICINE

## 2017-12-10 PROCEDURE — 80048 BASIC METABOLIC PNL TOTAL CA: CPT | Performed by: INTERNAL MEDICINE

## 2017-12-10 PROCEDURE — 85027 COMPLETE CBC AUTOMATED: CPT | Performed by: INTERNAL MEDICINE

## 2017-12-10 RX ORDER — FAMOTIDINE 40 MG/1
40 TABLET, FILM COATED ORAL 2 TIMES DAILY
Qty: 60 TABLET | Refills: 0 | Status: SHIPPED | OUTPATIENT
Start: 2017-12-10 | End: 2018-02-21

## 2017-12-10 RX ORDER — POTASSIUM CHLORIDE 20 MEQ/1
40 TABLET, EXTENDED RELEASE ORAL EVERY 4 HOURS
Status: DISCONTINUED | OUTPATIENT
Start: 2017-12-10 | End: 2017-12-10

## 2017-12-10 NOTE — PLAN OF CARE
GASTROINTESTINAL - ADULT    • Minimal or absence of nausea and vomiting Progressing        METABOLIC/FLUID AND ELECTROLYTES - ADULT    • Electrolytes maintained within normal limits Progressing        Assumed care of pt at 1930.  Pt denies nausea no emesis

## 2017-12-10 NOTE — PROGRESS NOTES
[facility] Hospitalist Progress Note     PCP: Tata Wong MD    Chief Complaint: follow-up    Overnight/Interim Events:      SUBJECTIVE:  Pt vomitting a little, retching. +nausea. Doesn't feel well to go home.      -170s    OBJECTIVE:  Temp:  [98 °F ( famoTIDine  40 mg Oral BID   • prednisoLONE acetate  1 drop Left Eye Daily   • AmLODIPine Besylate  5 mg Oral Daily   • cholecalciferol  1,000 Units Oral BID   • lisinopril  10 mg Oral Daily   • atorvastatin  10 mg Oral Nightly   • sulfaSALAzine  1,000 mg follow pain; may be having some withdrawal sxs currently; would try to wean opiods as o/p  - follows with Dr. Mary Grace Andrews as outpatient     # Concern for malnutrition  - nutrition consult     # hypokalemia  -replete per protocol, 3.1    #hypomagnesium  -rep

## 2017-12-11 PROBLEM — M47.816 LUMBAR SPONDYLOSIS: Status: ACTIVE | Noted: 2017-12-11

## 2017-12-11 PROBLEM — M51.36 DDD (DEGENERATIVE DISC DISEASE), LUMBAR: Status: ACTIVE | Noted: 2017-12-11

## 2017-12-11 PROBLEM — M51.369 DDD (DEGENERATIVE DISC DISEASE), LUMBAR: Status: ACTIVE | Noted: 2017-12-11

## 2017-12-11 NOTE — DISCHARGE SUMMARY
[facility] Internal Medicine Discharge Summary   Patient ID:  Carrie Hamper  [id number]  50 year old  8/20/1940    Pt seen and examined 12/10/2017    Admit date: 12/7/2017    Discharge date and time: 12/10/2017     Attending Physician: Eden Hinkle abdominal pain. Patient took 3 suppositories, an enema and dulcolax tablets in efforts to relieve suspected constipation. States she began to have nausea and vomiting, and presented [facility] for further evaluation.  No recent travel, new medications, ca 3.1     #hypomagnesium  -replete per protocol, 1.7     # DVT Prophylaxis:   - SCDs     Dispo:  Full code.        Day of discharge Exam    12/10/17  1150   BP: 152/50   Pulse: 77   Resp: 20   Temp: 98.9 °F (37.2 °C)       Exam on day of discharge:  Had eggs tablet (5 mg total) by mouth 4 (four) times daily before meals and nightly. oxyCODONE-acetaminophen  MG Tabs  Commonly known as:  PERCOCET  Take 1 tablet by mouth every 6 (six) hours as needed for Pain.  for 30 days     PEG 3350 Pack  Commonly kno GAS PATTERN:  No free air. Status post cholecystectomy. Status post T12 vertebroplasty changes unchanged. Decrease in the bilateral pleural effusions and basilar atelectasis compared to previous.  Marked decrease in the previously noted distended loops of l minimal diffuse disc bulge is suggested, with severe compression fracture/status post kyphoplasty at T12.  Posterior vertebral cortex buckling/retropulsion is noted into the central canal, eccentric to the right, resulting in mild to moderate central canal body heights are preserved. The distal spinal cord and conus are normal in morphology, signal intensity, and position. Disc desiccation is noted at all levels. Several scattered vertebral hemangiomas or foci of marrow fat are noted.  Left adrenal nodule is flavum thickening is seen. At L3-4, the posterolateral disc margins minimally encroach on the inferior aspect of the neural foramina bilaterally, without haleigh herniation. Mild facet arthropathy/ligamentum flavum thickening is seen.  At L4-5, minimal diffus hypertrophy. No spinal canal or neural foraminal stenosis. L3-L4:  Minimal diffuse disc bulge above facet hypertrophy. No spinal canal or neural foraminal stenosis. L4-L5:  Minimal diffuse disc bulge with bilateral facet hypertrophy.  Narrowing of the subar of nausea, vomiting, and diarrhea with low abdomen cramping since yesterday. FINDINGS:  LUNG BASES:  Dependent atelectasis bilaterally. Small hiatal hernia.  LIVER:  Normal in shape and contour but limited evaluation without contrast. BILIARY:   Cholecy 10/24/2017, 12:06. MARV , MRI SPINE LUMBAR (CPT=72148), 10/18/2017, 16:05.  TECHNIQUE: Written and verbal consent were obtained. The risks, benefits and alternatives were discussed with the patient and family.  The risks included but were not limited to was utilized for the procedure. After receiving the patient's consent, moderate sedation was achieved with Versed and fentanyl.  Monitoring of the patient's vital signs was provided by trained nursing staff during the entire course of the procedure under th

## 2017-12-12 NOTE — PROGRESS NOTES
Here are the biopsy/pathology findings from your recent EGD (Upper  Endoscopy): The stomach and esophageal biopsies are normal.  The duodenum biopsies show mild irritation. Follow-up information:  Continue pepcid 40 mg twice daily.     If you need any f

## 2017-12-19 PROCEDURE — 87086 URINE CULTURE/COLONY COUNT: CPT | Performed by: INTERNAL MEDICINE

## 2018-01-04 PROBLEM — M80.00XA OSTEOPOROSIS WITH CURRENT PATHOLOGICAL FRACTURE: Status: ACTIVE | Noted: 2018-01-04

## 2018-01-04 PROBLEM — M81.0 OSTEOPOROSIS: Status: ACTIVE | Noted: 2018-01-04

## 2018-01-17 PROBLEM — S22.081G: Status: ACTIVE | Noted: 2018-01-17

## 2018-01-23 ENCOUNTER — OFFICE VISIT (OUTPATIENT)
Dept: PHYSICAL THERAPY | Age: 78
End: 2018-01-23
Attending: EMERGENCY MEDICINE
Payer: MEDICARE

## 2018-01-23 PROCEDURE — 97162 PT EVAL MOD COMPLEX 30 MIN: CPT

## 2018-01-23 PROCEDURE — 97110 THERAPEUTIC EXERCISES: CPT

## 2018-01-23 NOTE — PROGRESS NOTES
SPINE EVALUATION:   Referring Physician: Dr. Teetee Estrada  Diagnosis: LBP, s/p T12 and L1 kyphoplasty (11/2 and 11/15/17) Date of Service: 1/23/2018     PATIENT SUMMARY   Mary Rose is a 76year old y/o female who presents to therapy today with comp and quadratus lumborum. Lateral flexion L and lumbar rotation R exacerbate her pain. Contributing factors include ankylosing spondylitis, recent T12/L1 compression fractures with kyphoplasty and hospitalization with use of TLSO for several weeks.  Myotomes bed mobility, use of pillows for support  Patient was instructed in and issued a HEP for  Supine heel slides, gluteal sets, ankle pumps, isometric abdominals. Charges: PT Eval , Mod complexity due to 3 comorbidities,evolving.         Total Timed Treatment: services furnished under this plan of treatment and while under my care.     X___________________________________________________ Date____________________    Certification From: 4/30/3041  To:4/23/2018

## 2018-01-29 ENCOUNTER — OFFICE VISIT (OUTPATIENT)
Dept: PHYSICAL THERAPY | Age: 78
End: 2018-01-29
Attending: ORTHOPAEDIC SURGERY
Payer: MEDICARE

## 2018-01-29 PROCEDURE — 97110 THERAPEUTIC EXERCISES: CPT

## 2018-01-29 PROCEDURE — 97112 NEUROMUSCULAR REEDUCATION: CPT

## 2018-01-29 NOTE — PROGRESS NOTES
Dx: s/p T12/L1 compression fx          Authorized # of Visits:  10       Next MD visit: none scheduled  Fall Risk: standard         Precautions:   Ankylosing spondylitis, osteoporosis,  R ankle weakness  S/p L 1 kyphoplasty          Subjective: Chief compla min  Total Treatment Time: 45 min

## 2018-02-01 ENCOUNTER — OFFICE VISIT (OUTPATIENT)
Dept: PHYSICAL THERAPY | Age: 78
End: 2018-02-01
Attending: ORTHOPAEDIC SURGERY
Payer: MEDICARE

## 2018-02-01 PROCEDURE — 97112 NEUROMUSCULAR REEDUCATION: CPT

## 2018-02-01 PROCEDURE — 97110 THERAPEUTIC EXERCISES: CPT

## 2018-02-01 NOTE — PROGRESS NOTES
Dx: s/p T12/L1 compression fx          Authorized # of Visits:  10       Next MD visit: none scheduled  Fall Risk: standard         Precautions:   Ankylosing spondylitis, osteoporosis,  R ankle weakness  S/p L 1 kyphoplasty          Subjective: Chief compla slides , gluteal sets,  Ankle pumps,  Isometric abdominals   All x15 reps HEP from home PT: cued for stable lumbar spine for hip abd add x15 each  Hip ext x10 R/L  Heel raises x15        5 T STS: 18 secs with UE support skip        Seated knee extension R/

## 2018-02-05 ENCOUNTER — OFFICE VISIT (OUTPATIENT)
Dept: PHYSICAL THERAPY | Age: 78
End: 2018-02-05
Attending: ORTHOPAEDIC SURGERY
Payer: MEDICARE

## 2018-02-05 PROCEDURE — 97112 NEUROMUSCULAR REEDUCATION: CPT

## 2018-02-05 PROCEDURE — 97140 MANUAL THERAPY 1/> REGIONS: CPT

## 2018-02-05 PROCEDURE — 97110 THERAPEUTIC EXERCISES: CPT

## 2018-02-05 NOTE — PROGRESS NOTES
Dx: s/p T12/L1 compression fx          Authorized # of Visits:  10       Next MD visit: none scheduled  Fall Risk: standard         Precautions:   Ankylosing spondylitis, osteoporosis,  R ankle weakness  S/p L 1 kyphoplasty          Subjective:  Had a few g sport cord x15  Shoulder circles x10   Extension x10       Nustep Seat 12 UEs 9  L5 St 11  L4 10 mins  5 mins       HEP: heel slides , gluteal sets,  Ankle pumps,  Isometric abdominals   All x15 reps HEP from home PT: cued for stable lumbar spine for hip a

## 2018-02-08 ENCOUNTER — OFFICE VISIT (OUTPATIENT)
Dept: PHYSICAL THERAPY | Age: 78
End: 2018-02-08
Attending: ORTHOPAEDIC SURGERY
Payer: MEDICARE

## 2018-02-08 PROCEDURE — 97110 THERAPEUTIC EXERCISES: CPT

## 2018-02-08 PROCEDURE — 97116 GAIT TRAINING THERAPY: CPT

## 2018-02-08 PROCEDURE — 97112 NEUROMUSCULAR REEDUCATION: CPT

## 2018-02-12 ENCOUNTER — OFFICE VISIT (OUTPATIENT)
Dept: PHYSICAL THERAPY | Age: 78
End: 2018-02-12
Attending: ORTHOPAEDIC SURGERY
Payer: MEDICARE

## 2018-02-12 PROCEDURE — 97112 NEUROMUSCULAR REEDUCATION: CPT

## 2018-02-12 PROCEDURE — 97116 GAIT TRAINING THERAPY: CPT

## 2018-02-12 PROCEDURE — 97110 THERAPEUTIC EXERCISES: CPT

## 2018-02-12 NOTE — PROGRESS NOTES
Dx: s/p T12/L1 compression fx          Authorized # of Visits:  10       Next MD visit: none scheduled  Fall Risk: standard         Precautions:   Ankylosing spondylitis, osteoporosis,  R ankle weakness  S/p L 1 kyphoplasty          Subjective:   Pain in th stretches for pectorals.   Date: 1/29/2018 TX#: 2/5 scheduled Date:   2/1/2018           TX#: 3/5   Date:   2/5/2018            TX#: 4/5 Date:   2/8/2018            TX#: 5/9 Date:   2/12/2018            TX#: 6/9 Date:               TX#: 7/ Date: without device, SBA, no sit breaks  1/10 of mile, walk practicing changes in speed,  Direction, with addition of neck rot R/L                                         Skilled Services: see above    Charges: ex , neuroreed, gait      Total Timed Treatment: 4

## 2018-02-15 ENCOUNTER — OFFICE VISIT (OUTPATIENT)
Dept: PHYSICAL THERAPY | Age: 78
End: 2018-02-15
Attending: ORTHOPAEDIC SURGERY
Payer: MEDICARE

## 2018-02-15 PROCEDURE — 97112 NEUROMUSCULAR REEDUCATION: CPT

## 2018-02-15 PROCEDURE — 97110 THERAPEUTIC EXERCISES: CPT

## 2018-02-15 NOTE — PROGRESS NOTES
Dx: s/p T12/L1 compression fx          Authorized # of Visits:  10       Next MD visit: none scheduled  Fall Risk: standard         Precautions:   Ankylosing spondylitis, osteoporosis,  R ankle weakness  S/p L 1 kyphoplasty          Subjective:   Skipped a difficulty    Plan: Cont with balance, gait, therapeutic neuroscience education. Check tolerance of supine exercises. Cont 19 and 22nd .  Sees Dr. Carl Louis next week  Date: 1/29/2018 TX#: 2/5 scheduled Date:   2/1/2018           TX#: 3/5   Date:   2/5/2018 stretch into flexion of R shoulder to 150 deg AROM shoulder flex R/L x15 ea  Finger ladder R/L x3 each skip ASU x15 R/L on   6\" step   x15 R/L w/ UE support skip        L side lying: use of towel between rib cage and hip,  Gr II lumbar rotation, distracti

## 2018-02-19 ENCOUNTER — OFFICE VISIT (OUTPATIENT)
Dept: PHYSICAL THERAPY | Age: 78
End: 2018-02-19
Attending: ORTHOPAEDIC SURGERY
Payer: MEDICARE

## 2018-02-19 PROCEDURE — 97112 NEUROMUSCULAR REEDUCATION: CPT

## 2018-02-19 PROCEDURE — 97110 THERAPEUTIC EXERCISES: CPT

## 2018-02-19 NOTE — PROGRESS NOTES
Dx: s/p T12/L1 compression fx          Authorized # of Visits:  10       Next MD visit: none scheduled  Fall Risk: standard         Precautions:   Ankylosing spondylitis, osteoporosis,  R ankle weakness  S/p L 1 kyphoplasty          Subjective:   Sees Dr. Fawn Ybarra 1/29/2018 TX#: 2/5 scheduled Date:   2/1/2018           TX#: 3/5   Date:   2/5/2018            TX#: 4/5 Date:   2/8/2018            TX#: 5/9 Date:   2/12/2018            TX#: 6/9 Date: 2/15/2018              TX#: 7/9 Date:    2/19/2018           TX#: 8/9 R/L with cues for ADIM  see above   skip skip skip Seated knee ext x20 R/L: no weights    seated stretch into flexion of R shoulder to 150 deg AROM shoulder flex R/L x15 ea  Finger ladder R/L x3 each skip ASU x15 R/L on   6\" step   x15 R/L w/ UE support s

## 2018-02-22 ENCOUNTER — OFFICE VISIT (OUTPATIENT)
Dept: PHYSICAL THERAPY | Age: 78
End: 2018-02-22
Attending: ORTHOPAEDIC SURGERY
Payer: MEDICARE

## 2018-02-22 PROCEDURE — 82746 ASSAY OF FOLIC ACID SERUM: CPT | Performed by: INTERNAL MEDICINE

## 2018-02-22 PROCEDURE — 84165 PROTEIN E-PHORESIS SERUM: CPT | Performed by: INTERNAL MEDICINE

## 2018-02-22 PROCEDURE — 97110 THERAPEUTIC EXERCISES: CPT

## 2018-02-22 PROCEDURE — 82607 VITAMIN B-12: CPT | Performed by: INTERNAL MEDICINE

## 2018-02-22 PROCEDURE — 86334 IMMUNOFIX E-PHORESIS SERUM: CPT | Performed by: INTERNAL MEDICINE

## 2018-02-22 PROCEDURE — 83516 IMMUNOASSAY NONANTIBODY: CPT | Performed by: INTERNAL MEDICINE

## 2018-02-22 PROCEDURE — 86803 HEPATITIS C AB TEST: CPT | Performed by: INTERNAL MEDICINE

## 2018-02-22 PROCEDURE — 83883 ASSAY NEPHELOMETRY NOT SPEC: CPT | Performed by: INTERNAL MEDICINE

## 2018-02-22 PROCEDURE — 86256 FLUORESCENT ANTIBODY TITER: CPT | Performed by: INTERNAL MEDICINE

## 2018-02-22 PROCEDURE — 82784 ASSAY IGA/IGD/IGG/IGM EACH: CPT | Performed by: INTERNAL MEDICINE

## 2018-02-22 PROCEDURE — 87389 HIV-1 AG W/HIV-1&-2 AB AG IA: CPT | Performed by: INTERNAL MEDICINE

## 2018-02-22 PROCEDURE — 97112 NEUROMUSCULAR REEDUCATION: CPT

## 2018-02-22 NOTE — PROGRESS NOTES
Dx: s/p T12/L1 compression fx          Authorized # of Visits:  10       Next MD visit: none scheduled  Fall Risk: standard         Precautions:   Ankylosing spondylitis, osteoporosis,  R ankle weakness  S/p L 1 kyphoplasty        Discharge Summary    Pt ha patient to get in and out of a car and drive with minimal difficulty  NOT MET  * Improve trunk rotation right and lateral flexion to allow patient to complete light household chores with minimal difficulty   50% imiproved      Projected G Code: Changing an x10   x15 each Pectoral stretches supine, seated,   Chest stretch standing 30 s x3  supine 30 sec x3  Pectoral stretches at door   30 sec x3  cont with seated pectoral stretches  Cont with cues to avoid lumbar extension   Nustep Seat 12 UEs 9  L5 St 11  L4 Treatment: 45 min  Total Treatment Time: 45 min

## 2018-02-22 NOTE — PROGRESS NOTES
*Discharge Summary    Pt has attended 8, cancelled 0, and no shown 0 visits in Physical Therapy. Subjective:    Pain at R lateral torso persists in area of her lowest rib.   Due to her 30 pound weight loss and intractable pain, Dr. Steve Irvin has ordered bl care.        Thank you for your referral. If you have any questions, please contact me at Dept: [phone].     Sincerely,  Electronically signed by therapist: Belita Dandy

## 2018-06-27 PROBLEM — I70.0 AORTIC ATHEROSCLEROSIS (HCC): Status: ACTIVE | Noted: 2018-06-27

## 2018-06-27 PROBLEM — I70.0 AORTIC ATHEROSCLEROSIS: Status: ACTIVE | Noted: 2018-06-27

## 2018-06-28 PROCEDURE — 84165 PROTEIN E-PHORESIS SERUM: CPT | Performed by: INTERNAL MEDICINE

## 2018-06-28 PROCEDURE — 83883 ASSAY NEPHELOMETRY NOT SPEC: CPT | Performed by: INTERNAL MEDICINE

## 2018-06-28 PROCEDURE — 86334 IMMUNOFIX E-PHORESIS SERUM: CPT | Performed by: INTERNAL MEDICINE

## 2018-07-02 PROCEDURE — 83883 ASSAY NEPHELOMETRY NOT SPEC: CPT | Performed by: INTERNAL MEDICINE

## 2018-07-02 PROCEDURE — 86335 IMMUNFIX E-PHORSIS/URINE/CSF: CPT | Performed by: INTERNAL MEDICINE

## 2018-07-02 PROCEDURE — 84156 ASSAY OF PROTEIN URINE: CPT | Performed by: INTERNAL MEDICINE

## 2018-07-02 PROCEDURE — 36415 COLL VENOUS BLD VENIPUNCTURE: CPT | Performed by: INTERNAL MEDICINE

## 2018-07-09 PROBLEM — D47.2 MONOCLONAL (M) PROTEIN DISEASE, MULTIPLE 'M' PROTEIN: Status: ACTIVE | Noted: 2018-07-09

## 2019-01-15 PROCEDURE — 87088 URINE BACTERIA CULTURE: CPT | Performed by: INTERNAL MEDICINE

## 2019-01-15 PROCEDURE — 87186 SC STD MICRODIL/AGAR DIL: CPT | Performed by: INTERNAL MEDICINE

## 2019-01-15 PROCEDURE — 87086 URINE CULTURE/COLONY COUNT: CPT | Performed by: INTERNAL MEDICINE

## 2019-01-25 PROBLEM — M81.0 AGE-RELATED OSTEOPOROSIS WITHOUT CURRENT PATHOLOGICAL FRACTURE: Status: ACTIVE | Noted: 2019-01-25

## 2019-01-25 PROCEDURE — 87086 URINE CULTURE/COLONY COUNT: CPT | Performed by: INTERNAL MEDICINE

## 2019-01-25 PROCEDURE — 81001 URINALYSIS AUTO W/SCOPE: CPT | Performed by: INTERNAL MEDICINE

## 2019-02-03 ENCOUNTER — HOSPITAL ENCOUNTER (EMERGENCY)
Facility: HOSPITAL | Age: 79
Discharge: HOME | End: 2019-02-03
Attending: EMERGENCY MEDICINE
Payer: MEDICARE

## 2019-02-03 VITALS
TEMPERATURE: 100 F | BODY MASS INDEX: 24.48 KG/M2 | HEIGHT: 67 IN | SYSTOLIC BLOOD PRESSURE: 163 MMHG | HEART RATE: 94 BPM | DIASTOLIC BLOOD PRESSURE: 66 MMHG | RESPIRATION RATE: 18 BRPM | OXYGEN SATURATION: 93 % | WEIGHT: 156 LBS

## 2019-02-03 DIAGNOSIS — M54.40 BACK PAIN OF LUMBAR REGION WITH SCIATICA: Primary | ICD-10-CM

## 2019-02-03 LAB
ALBUMIN SERPL-MCNC: 2.6 G/DL (ref 3.1–4.5)
ALBUMIN/GLOB SERPL: 0.5 {RATIO} (ref 1–2)
ALP LIVER SERPL-CCNC: 87 U/L (ref 55–142)
ALT SERPL-CCNC: 33 U/L (ref 14–54)
ANION GAP SERPL CALC-SCNC: 8 MMOL/L (ref 0–18)
AST SERPL-CCNC: 18 U/L (ref 15–41)
BASOPHILS # BLD AUTO: 0.02 X10(3) UL (ref 0–0.2)
BASOPHILS NFR BLD AUTO: 0.2 %
BILIRUB SERPL-MCNC: 0.3 MG/DL (ref 0.1–2)
BILIRUB UR QL STRIP.AUTO: NEGATIVE
BUN BLD-MCNC: 16 MG/DL (ref 8–20)
BUN/CREAT SERPL: 36.4 (ref 10–20)
CALCIUM BLD-MCNC: 9.1 MG/DL (ref 8.3–10.3)
CHLORIDE SERPL-SCNC: 105 MMOL/L (ref 101–111)
CLARITY UR REFRACT.AUTO: CLEAR
CO2 SERPL-SCNC: 28 MMOL/L (ref 22–32)
COLOR UR AUTO: YELLOW
CREAT BLD-MCNC: 0.44 MG/DL (ref 0.55–1.02)
DEPRECATED RDW RBC AUTO: 42.7 FL (ref 35.1–46.3)
EOSINOPHIL # BLD AUTO: 0.05 X10(3) UL (ref 0–0.7)
EOSINOPHIL NFR BLD AUTO: 0.4 %
ERYTHROCYTE [DISTWIDTH] IN BLOOD BY AUTOMATED COUNT: 11.7 % (ref 11–15)
GLOBULIN PLAS-MCNC: 4.8 G/DL (ref 2.8–4.4)
GLUCOSE BLD-MCNC: 127 MG/DL (ref 70–99)
GLUCOSE UR STRIP.AUTO-MCNC: NEGATIVE MG/DL
HCT VFR BLD AUTO: 35.5 % (ref 35–48)
HGB BLD-MCNC: 11.4 G/DL (ref 12–16)
IMM GRANULOCYTES # BLD AUTO: 0.07 X10(3) UL (ref 0–1)
IMM GRANULOCYTES NFR BLD: 0.6 %
KETONES UR STRIP.AUTO-MCNC: NEGATIVE MG/DL
LEUKOCYTE ESTERASE UR QL STRIP.AUTO: NEGATIVE
LYMPHOCYTES # BLD AUTO: 1.57 X10(3) UL (ref 1–4)
LYMPHOCYTES NFR BLD AUTO: 13.7 %
M PROTEIN MFR SERPL ELPH: 7.4 G/DL (ref 6.4–8.2)
MCH RBC QN AUTO: 31.8 PG (ref 26–34)
MCHC RBC AUTO-ENTMCNC: 32.1 G/DL (ref 31–37)
MCV RBC AUTO: 99.2 FL (ref 80–100)
MONOCYTES # BLD AUTO: 1.12 X10(3) UL (ref 0.1–1)
MONOCYTES NFR BLD AUTO: 9.8 %
NEUTROPHILS # BLD AUTO: 8.61 X10 (3) UL (ref 1.5–7.7)
NEUTROPHILS # BLD AUTO: 8.61 X10(3) UL (ref 1.5–7.7)
NEUTROPHILS NFR BLD AUTO: 75.3 %
NITRITE UR QL STRIP.AUTO: NEGATIVE
OSMOLALITY SERPL CALC.SUM OF ELEC: 295 MOSM/KG (ref 275–295)
PH UR STRIP.AUTO: 7 [PH] (ref 4.5–8)
PLATELET # BLD AUTO: 437 10(3)UL (ref 150–450)
POTASSIUM SERPL-SCNC: 3.8 MMOL/L (ref 3.6–5.1)
PROT UR STRIP.AUTO-MCNC: NEGATIVE MG/DL
RBC # BLD AUTO: 3.58 X10(6)UL (ref 3.8–5.3)
RBC #/AREA URNS AUTO: >10 /HPF
SODIUM SERPL-SCNC: 141 MMOL/L (ref 136–144)
SP GR UR STRIP.AUTO: 1.01 (ref 1–1.03)
UROBILINOGEN UR STRIP.AUTO-MCNC: <2 MG/DL
WBC # BLD AUTO: 11.4 X10(3) UL (ref 4–11)

## 2019-02-03 PROCEDURE — 96361 HYDRATE IV INFUSION ADD-ON: CPT

## 2019-02-03 PROCEDURE — 99284 EMERGENCY DEPT VISIT MOD MDM: CPT

## 2019-02-03 PROCEDURE — 80053 COMPREHEN METABOLIC PANEL: CPT | Performed by: EMERGENCY MEDICINE

## 2019-02-03 PROCEDURE — 81001 URINALYSIS AUTO W/SCOPE: CPT | Performed by: EMERGENCY MEDICINE

## 2019-02-03 PROCEDURE — 96374 THER/PROPH/DIAG INJ IV PUSH: CPT

## 2019-02-03 PROCEDURE — 96375 TX/PRO/DX INJ NEW DRUG ADDON: CPT

## 2019-02-03 PROCEDURE — 85025 COMPLETE CBC W/AUTO DIFF WBC: CPT | Performed by: EMERGENCY MEDICINE

## 2019-02-03 RX ORDER — DIAZEPAM 5 MG/1
TABLET ORAL 3 TIMES DAILY PRN
Qty: 20 TABLET | Refills: 0 | Status: SHIPPED | OUTPATIENT
Start: 2019-02-03 | End: 2019-02-13

## 2019-02-03 RX ORDER — LIDOCAINE 50 MG/G
2 PATCH TOPICAL ONCE
Status: DISCONTINUED | OUTPATIENT
Start: 2019-02-03 | End: 2019-02-03

## 2019-02-03 RX ORDER — DEXAMETHASONE SODIUM PHOSPHATE 4 MG/ML
10 VIAL (ML) INJECTION ONCE
Status: COMPLETED | OUTPATIENT
Start: 2019-02-03 | End: 2019-02-03

## 2019-02-03 RX ORDER — NAPROXEN 500 MG/1
500 TABLET ORAL 2 TIMES DAILY PRN
Qty: 20 TABLET | Refills: 0 | Status: ON HOLD | OUTPATIENT
Start: 2019-02-03 | End: 2019-03-05

## 2019-02-03 RX ORDER — LIDOCAINE 50 MG/G
2 PATCH TOPICAL EVERY 24 HOURS
Qty: 30 PATCH | Refills: 0 | Status: SHIPPED | OUTPATIENT
Start: 2019-02-03 | End: 2019-07-03

## 2019-02-03 RX ORDER — ONDANSETRON 2 MG/ML
4 INJECTION INTRAMUSCULAR; INTRAVENOUS ONCE
Status: COMPLETED | OUTPATIENT
Start: 2019-02-03 | End: 2019-02-03

## 2019-02-03 RX ORDER — ONDANSETRON 2 MG/ML
4 INJECTION INTRAMUSCULAR; INTRAVENOUS ONCE
Status: DISCONTINUED | OUTPATIENT
Start: 2019-02-03 | End: 2019-02-03

## 2019-02-03 RX ORDER — METHYLPREDNISOLONE 4 MG/1
TABLET ORAL
Qty: 1 PACKAGE | Refills: 0 | Status: SHIPPED | OUTPATIENT
Start: 2019-02-03 | End: 2019-02-08

## 2019-02-03 RX ORDER — KETOROLAC TROMETHAMINE 30 MG/ML
15 INJECTION, SOLUTION INTRAMUSCULAR; INTRAVENOUS ONCE
Status: COMPLETED | OUTPATIENT
Start: 2019-02-03 | End: 2019-02-03

## 2019-02-03 RX ORDER — ONDANSETRON 2 MG/ML
INJECTION INTRAMUSCULAR; INTRAVENOUS
Status: DISCONTINUED
Start: 2019-02-03 | End: 2019-02-03

## 2019-02-03 RX ORDER — MORPHINE SULFATE 4 MG/ML
2 INJECTION, SOLUTION INTRAMUSCULAR; INTRAVENOUS ONCE
Status: COMPLETED | OUTPATIENT
Start: 2019-02-03 | End: 2019-02-03

## 2019-02-03 NOTE — ED INITIAL ASSESSMENT (HPI)
Pt w/ recent UTI, took ATB and now ankylosing spondylitis is acting up. Pt in a lot of pain to R mid back.

## 2019-02-03 NOTE — ED PROVIDER NOTES
Patient Seen in: [facility] Emergency Department    History   Patient presents with:  Pain (neurologic)    Stated Complaint:     HPI    49-year-old female presents to the emergency department with complaints of increasing back pain and sciatica.   Pa • COLONOSCOPY,DIAGNOSTIC  2/3/10    Dr. Shantel Haq, mild erythema of the rectosigmoid, possibly a variant of normal, bx show collagenous colitis, ileum normal   • ESOPHAGOGASTRODUODENOSCOPY (EGD) N/A 12/8/2017    Performed by Geronimo Anderson MD at [facility] ENDOSCOP • UPPER GI ENDOSCOPY,DIAGNOSIS  1990    wnl           Social History    Tobacco Use      Smoking status: Never Smoker      Smokeless tobacco: Never Used      Tobacco comment: non-smoker    Alcohol use:  Yes      Alcohol/week: 0.0 oz      Comment: \"rare\" Nursing note and vitals reviewed.            ED Course     Labs Reviewed   COMP METABOLIC PANEL (14) - Abnormal; Notable for the following components:       Result Value    Glucose 127 (*)     Creatinine 0.44 (*)     BUN/CREA Ratio 36.4 (*)     Albumin 2.6 [address]    Schedule an appointment as soon as possible for a visit          Medications Prescribed:  Discharge Medication List as of 2/3/2019 10:52 AM    START taking these medications    methylPREDNIS

## 2019-02-06 PROCEDURE — 87086 URINE CULTURE/COLONY COUNT: CPT | Performed by: UROLOGY

## 2019-02-06 PROCEDURE — 87186 SC STD MICRODIL/AGAR DIL: CPT | Performed by: UROLOGY

## 2019-02-06 PROCEDURE — 87088 URINE BACTERIA CULTURE: CPT | Performed by: UROLOGY

## 2019-03-05 ENCOUNTER — APPOINTMENT (OUTPATIENT)
Dept: GENERAL RADIOLOGY | Facility: HOSPITAL | Age: 79
End: 2019-03-05
Attending: INTERNAL MEDICINE
Payer: MEDICARE

## 2019-03-05 ENCOUNTER — HOSPITAL ENCOUNTER (OUTPATIENT)
Facility: HOSPITAL | Age: 79
Setting detail: OBSERVATION
LOS: 1 days | Discharge: HOME | End: 2019-03-08
Attending: INTERNAL MEDICINE | Admitting: INTERNAL MEDICINE
Payer: MEDICARE

## 2019-03-05 PROBLEM — M25.50 JOINT PAIN: Status: ACTIVE | Noted: 2019-03-05

## 2019-03-05 LAB
ALBUMIN SERPL-MCNC: 2.6 G/DL (ref 3.4–5)
ALP LIVER SERPL-CCNC: 69 U/L (ref 55–142)
ALT SERPL-CCNC: 19 U/L (ref 13–56)
ANION GAP SERPL CALC-SCNC: 8 MMOL/L (ref 0–18)
AST SERPL-CCNC: 15 U/L (ref 15–37)
BASOPHILS # BLD AUTO: 0.01 X10(3) UL (ref 0–0.2)
BASOPHILS NFR BLD AUTO: 0.1 %
BILIRUB DIRECT SERPL-MCNC: 0.1 MG/DL (ref 0–0.2)
BILIRUB SERPL-MCNC: 0.3 MG/DL (ref 0.1–2)
BUN BLD-MCNC: 28 MG/DL (ref 7–18)
BUN/CREAT SERPL: 43.1 (ref 10–20)
CALCIUM BLD-MCNC: 8.9 MG/DL (ref 8.5–10.1)
CHLORIDE SERPL-SCNC: 106 MMOL/L (ref 98–107)
CO2 SERPL-SCNC: 27 MMOL/L (ref 21–32)
CREAT BLD-MCNC: 0.65 MG/DL (ref 0.55–1.02)
CRP SERPL-MCNC: 4.32 MG/DL (ref ?–0.3)
DEPRECATED RDW RBC AUTO: 50.9 FL (ref 35.1–46.3)
EOSINOPHIL # BLD AUTO: 0.09 X10(3) UL (ref 0–0.7)
EOSINOPHIL NFR BLD AUTO: 0.8 %
ERYTHROCYTE [DISTWIDTH] IN BLOOD BY AUTOMATED COUNT: 13.7 % (ref 11–15)
GLUCOSE BLD-MCNC: 132 MG/DL (ref 70–99)
HCT VFR BLD AUTO: 32.9 % (ref 35–48)
HGB BLD-MCNC: 10.5 G/DL (ref 12–16)
IMM GRANULOCYTES # BLD AUTO: 0.07 X10(3) UL (ref 0–1)
IMM GRANULOCYTES NFR BLD: 0.6 %
LYMPHOCYTES # BLD AUTO: 3.04 X10(3) UL (ref 1–4)
LYMPHOCYTES NFR BLD AUTO: 26.1 %
M PROTEIN MFR SERPL ELPH: 6.7 G/DL (ref 6.4–8.2)
MCH RBC QN AUTO: 31.9 PG (ref 26–34)
MCHC RBC AUTO-ENTMCNC: 31.9 G/DL (ref 31–37)
MCV RBC AUTO: 100 FL (ref 80–100)
MONOCYTES # BLD AUTO: 1 X10(3) UL (ref 0.1–1)
MONOCYTES NFR BLD AUTO: 8.6 %
NEUTROPHILS # BLD AUTO: 7.43 X10 (3) UL (ref 1.5–7.7)
NEUTROPHILS # BLD AUTO: 7.43 X10(3) UL (ref 1.5–7.7)
NEUTROPHILS NFR BLD AUTO: 63.8 %
OSMOLALITY SERPL CALC.SUM OF ELEC: 299 MOSM/KG (ref 275–295)
PLATELET # BLD AUTO: 274 10(3)UL (ref 150–450)
POTASSIUM SERPL-SCNC: 3.9 MMOL/L (ref 3.5–5.1)
RBC # BLD AUTO: 3.29 X10(6)UL (ref 3.8–5.3)
SED RATE-ML: 70 MM/HR (ref 0–25)
SODIUM SERPL-SCNC: 141 MMOL/L (ref 136–145)
WBC # BLD AUTO: 11.6 X10(3) UL (ref 4–11)

## 2019-03-05 PROCEDURE — 86140 C-REACTIVE PROTEIN: CPT | Performed by: INTERNAL MEDICINE

## 2019-03-05 PROCEDURE — 73080 X-RAY EXAM OF ELBOW: CPT | Performed by: INTERNAL MEDICINE

## 2019-03-05 PROCEDURE — 80076 HEPATIC FUNCTION PANEL: CPT | Performed by: INTERNAL MEDICINE

## 2019-03-05 PROCEDURE — 85025 COMPLETE CBC W/AUTO DIFF WBC: CPT | Performed by: INTERNAL MEDICINE

## 2019-03-05 PROCEDURE — 80048 BASIC METABOLIC PNL TOTAL CA: CPT | Performed by: INTERNAL MEDICINE

## 2019-03-05 PROCEDURE — 85652 RBC SED RATE AUTOMATED: CPT | Performed by: INTERNAL MEDICINE

## 2019-03-05 PROCEDURE — 73610 X-RAY EXAM OF ANKLE: CPT | Performed by: INTERNAL MEDICINE

## 2019-03-05 PROCEDURE — 73560 X-RAY EXAM OF KNEE 1 OR 2: CPT | Performed by: INTERNAL MEDICINE

## 2019-03-05 RX ORDER — ACETAMINOPHEN 500 MG
1000 TABLET ORAL EVERY 6 HOURS PRN
COMMUNITY

## 2019-03-05 RX ORDER — MORPHINE SULFATE 4 MG/ML
2 INJECTION, SOLUTION INTRAMUSCULAR; INTRAVENOUS EVERY 2 HOUR PRN
Status: DISCONTINUED | OUTPATIENT
Start: 2019-03-05 | End: 2019-03-08

## 2019-03-05 RX ORDER — MORPHINE SULFATE 4 MG/ML
1 INJECTION, SOLUTION INTRAMUSCULAR; INTRAVENOUS EVERY 2 HOUR PRN
Status: DISCONTINUED | OUTPATIENT
Start: 2019-03-05 | End: 2019-03-08

## 2019-03-05 RX ORDER — SULFASALAZINE 500 MG/1
1000 TABLET, DELAYED RELEASE ORAL 2 TIMES DAILY
Status: DISCONTINUED | OUTPATIENT
Start: 2019-03-05 | End: 2019-03-08

## 2019-03-05 RX ORDER — CEPHALEXIN 250 MG/1
250 CAPSULE ORAL DAILY
Status: DISCONTINUED | OUTPATIENT
Start: 2019-03-06 | End: 2019-03-08

## 2019-03-05 RX ORDER — MORPHINE SULFATE 4 MG/ML
4 INJECTION, SOLUTION INTRAMUSCULAR; INTRAVENOUS EVERY 2 HOUR PRN
Status: DISCONTINUED | OUTPATIENT
Start: 2019-03-05 | End: 2019-03-08

## 2019-03-05 RX ORDER — AMLODIPINE BESYLATE 5 MG/1
5 TABLET ORAL
Status: DISCONTINUED | OUTPATIENT
Start: 2019-03-06 | End: 2019-03-08

## 2019-03-05 RX ORDER — PRAVASTATIN SODIUM 20 MG
20 TABLET ORAL NIGHTLY
Status: DISCONTINUED | OUTPATIENT
Start: 2019-03-05 | End: 2019-03-08

## 2019-03-05 RX ORDER — FAMOTIDINE 20 MG/1
40 TABLET ORAL 2 TIMES DAILY
Status: DISCONTINUED | OUTPATIENT
Start: 2019-03-05 | End: 2019-03-08

## 2019-03-05 RX ORDER — ACETAMINOPHEN 325 MG/1
650 TABLET ORAL EVERY 6 HOURS PRN
Status: DISCONTINUED | OUTPATIENT
Start: 2019-03-05 | End: 2019-03-08

## 2019-03-06 ENCOUNTER — APPOINTMENT (OUTPATIENT)
Dept: MRI IMAGING | Facility: HOSPITAL | Age: 79
End: 2019-03-06
Attending: INTERNAL MEDICINE
Payer: MEDICARE

## 2019-03-06 ENCOUNTER — APPOINTMENT (OUTPATIENT)
Dept: GENERAL RADIOLOGY | Facility: HOSPITAL | Age: 79
End: 2019-03-06
Attending: INTERNAL MEDICINE
Payer: MEDICARE

## 2019-03-06 LAB
ANION GAP SERPL CALC-SCNC: 8 MMOL/L (ref 0–18)
BILIRUB UR QL STRIP.AUTO: NEGATIVE
BUN BLD-MCNC: 24 MG/DL (ref 7–18)
BUN/CREAT SERPL: 58.5 (ref 10–20)
CALCIUM BLD-MCNC: 8.4 MG/DL (ref 8.5–10.1)
CHLORIDE SERPL-SCNC: 108 MMOL/L (ref 98–107)
CLARITY UR REFRACT.AUTO: CLEAR
CO2 SERPL-SCNC: 26 MMOL/L (ref 21–32)
COLOR UR AUTO: YELLOW
CREAT BLD-MCNC: 0.41 MG/DL (ref 0.55–1.02)
DEPRECATED RDW RBC AUTO: 50.2 FL (ref 35.1–46.3)
ERYTHROCYTE [DISTWIDTH] IN BLOOD BY AUTOMATED COUNT: 13.8 % (ref 11–15)
GLUCOSE BLD-MCNC: 93 MG/DL (ref 70–99)
GLUCOSE UR STRIP.AUTO-MCNC: NEGATIVE MG/DL
HCT VFR BLD AUTO: 32.6 % (ref 35–48)
HGB BLD-MCNC: 10.3 G/DL (ref 12–16)
KETONES UR STRIP.AUTO-MCNC: NEGATIVE MG/DL
LEUKOCYTE ESTERASE UR QL STRIP.AUTO: NEGATIVE
MCH RBC QN AUTO: 31.2 PG (ref 26–34)
MCHC RBC AUTO-ENTMCNC: 31.6 G/DL (ref 31–37)
MCV RBC AUTO: 98.8 FL (ref 80–100)
NITRITE UR QL STRIP.AUTO: NEGATIVE
OSMOLALITY SERPL CALC.SUM OF ELEC: 298 MOSM/KG (ref 275–295)
PH UR STRIP.AUTO: 6 [PH] (ref 4.5–8)
PLATELET # BLD AUTO: 245 10(3)UL (ref 150–450)
POTASSIUM SERPL-SCNC: 3.8 MMOL/L (ref 3.5–5.1)
PROT UR STRIP.AUTO-MCNC: NEGATIVE MG/DL
RBC # BLD AUTO: 3.3 X10(6)UL (ref 3.8–5.3)
RBC UR QL AUTO: NEGATIVE
SODIUM SERPL-SCNC: 142 MMOL/L (ref 136–145)
SP GR UR STRIP.AUTO: 1.02 (ref 1–1.03)
URATE SERPL-MCNC: 2.5 MG/DL (ref 2.6–6)
UROBILINOGEN UR STRIP.AUTO-MCNC: <2 MG/DL
WBC # BLD AUTO: 9.6 X10(3) UL (ref 4–11)

## 2019-03-06 PROCEDURE — A9575 INJ GADOTERATE MEGLUMI 0.1ML: HCPCS | Performed by: INTERNAL MEDICINE

## 2019-03-06 PROCEDURE — 96376 TX/PRO/DX INJ SAME DRUG ADON: CPT

## 2019-03-06 PROCEDURE — 80048 BASIC METABOLIC PNL TOTAL CA: CPT | Performed by: INTERNAL MEDICINE

## 2019-03-06 PROCEDURE — 87086 URINE CULTURE/COLONY COUNT: CPT | Performed by: INTERNAL MEDICINE

## 2019-03-06 PROCEDURE — 85027 COMPLETE CBC AUTOMATED: CPT | Performed by: INTERNAL MEDICINE

## 2019-03-06 PROCEDURE — 71046 X-RAY EXAM CHEST 2 VIEWS: CPT | Performed by: INTERNAL MEDICINE

## 2019-03-06 PROCEDURE — 97116 GAIT TRAINING THERAPY: CPT

## 2019-03-06 PROCEDURE — 84550 ASSAY OF BLOOD/URIC ACID: CPT | Performed by: INTERNAL MEDICINE

## 2019-03-06 PROCEDURE — 96375 TX/PRO/DX INJ NEW DRUG ADDON: CPT

## 2019-03-06 PROCEDURE — 96372 THER/PROPH/DIAG INJ SC/IM: CPT

## 2019-03-06 PROCEDURE — 97161 PT EVAL LOW COMPLEX 20 MIN: CPT

## 2019-03-06 PROCEDURE — 81003 URINALYSIS AUTO W/O SCOPE: CPT | Performed by: INTERNAL MEDICINE

## 2019-03-06 PROCEDURE — 96374 THER/PROPH/DIAG INJ IV PUSH: CPT

## 2019-03-06 PROCEDURE — 73723 MRI JOINT LWR EXTR W/O&W/DYE: CPT | Performed by: INTERNAL MEDICINE

## 2019-03-06 RX ORDER — METHYLPREDNISOLONE SODIUM SUCCINATE 125 MG/2ML
60 INJECTION, POWDER, LYOPHILIZED, FOR SOLUTION INTRAMUSCULAR; INTRAVENOUS EVERY 12 HOURS
Status: DISCONTINUED | OUTPATIENT
Start: 2019-03-06 | End: 2019-03-08

## 2019-03-06 RX ORDER — HEPARIN SODIUM 5000 [USP'U]/ML
5000 INJECTION, SOLUTION INTRAVENOUS; SUBCUTANEOUS EVERY 8 HOURS SCHEDULED
Status: DISCONTINUED | OUTPATIENT
Start: 2019-03-06 | End: 2019-03-08

## 2019-03-06 NOTE — PLAN OF CARE
Resumed care of pt. At [address].   Pt. Is Ax4, calm, and cooperative,   at bedside  Admission Navigator completed  Tylenol for pain  Xrays completed  Rash to sacrum and L buttocks  MRI in am  RA, no telemetry  PT and rheumatology consults  Bed is in [facility]

## 2019-03-06 NOTE — CM/SW NOTE
Patient failed inpatient criteria. Second level of review completed and supports observation. UR committee in agreement. Discussed with Dr. Lovely Stone  who approves observation status. MOON to be given to the patient and order written.

## 2019-03-06 NOTE — PAYOR COMM NOTE
MARV--------------  ADMISSION REVIEW     Payor: [facility] [place] #:  [id number]  Authorization Number: N/A        MEDICATIONS ADMINISTERED IN LAST 1 DAY:  acetaminophen (TYLENOL) tab 650 mg     Date Action Dose Route User    3/5/2019

## 2019-03-06 NOTE — PHYSICAL THERAPY NOTE
PHYSICAL THERAPY EVALUATION - INPATIENT     Room Number: 6806/5288-U  Evaluation Date: 3/6/2019  Type of Evaluation: Initial  Physician Order: PT Eval and Treat    Presenting Problem: joint pain  Reason for Therapy: Mobility Dysfunction and Discharge • CHOLECYSTECTOMY  2005   • COLONOSCOPY,BIOPSY  2/13/06    nonspecific erythema transverse colon (bx marked acute & chronic colitis), ileum wnl   • COLONOSCOPY,DIAGNOSTIC  1990    mild inflammatory change sigmoid colon   • COLONOSCOPY,DIAGNOSTIC  2/3/10 • UP GI ENDOSCOPY,BALL DIL,30MM  6/12/14    distal esophageal stricture, bx and dilated 18-20 mm   • UPPER GI ENDOSCOPY,BIOPSY  2/10    3 gastric ulcers from diclofenac, bx for H. pylori was negative   • UPPER GI ENDOSCOPY,DIAGNOSIS  1990    wnl       HO Mobility Scale: 13/20                           NEUROLOGICAL FINDINGS  Neurological Findings: None                   ACTIVITY TOLERANCE                         O2 WALK                  AM-PAC '6-Clicks' INPATIENT SHORT FORM - BASIC MOBILITY  How much diffi within reach;RN aware of session/findings; All patient questions and concerns addressed; Discussed recommendations with /; Family present    ASSESSMENT   Patient is a [de-identified]year old female admitted on 3/5/2019 for joint pain.  Pt reports facility per pt)    PLAN  PT Treatment Plan: Endurance; Energy conservation;Patient education;Gait training;Range of motion;Strengthening;Transfer training;Balance training  Rehab Potential : Good  Frequency (Obs): 5x/week  Number of Visits to Meet Peter

## 2019-03-06 NOTE — H&P
[facility] Hospitalist History and Physical      No chief complaint on file.        PCP: Jacob Moy MD      History of Present Illness: Patient is a [de-identified]year old female with PMH sig for ankylosing spondylitis, htn and hl who presents to [facility] with polyarticular p Performed by Chilango Root MD at [facility] ENDOSCOPY   • ESOPHAGOGASTRODUODENOSCOPY, POSSIBLE DILATION, POSSIBLE BIOSPY, POSSIBLE POLYPECTOMY N/A 5/4/2009    Performed by Chilango Root MD at [address] [place] [address]   • HEMORRHOIDECTOMY,INT/EXT,SIMPLE status: Never Smoker      Smokeless tobacco: Never Used      Tobacco comment: non-smoker    Alcohol use:  Yes      Alcohol/week: 0.0 oz      Comment: \"rare\"       Fam Hx  Family History   Problem Relation Age of Onset   • Other (Other) Father         AAA, cyanosis or edema. Skin: Skin color, texture, turgor normal. No rashes or lesions.     Neurologic: Moving all extremities spontaneously, no focal deficit appreciated     Data Review:    LABS:   Lab Results   Component Value Date    WBC 9.6 03/06/2019    H talar dome appear intact. There is no fracture, subluxation or dislocation. There is mild soft tissue swelling in the lower leg and ankle. Osseous structures appear somewhat demineralized.       CONCLUSION:  Soft tissue swelling without evidence for acut Dictated by: Atul Velasquez MD on 3/05/2019 at 22:47     Approved by: Atul Velasquez MD            Mri Ankle (w+wo), Left (cpt=73723)    Result Date: 3/6/2019  PROCEDURE:  MRI ANKLE (W+WO), LEFT (CPT=73723)  COMPARISON:   INDICATIONS:  History of d consistent with diffuse polyarthropathy of the ankle/tarsus as described above. Multiple subtle foci of osseous erosive change along these regions of synovitis noted. Dictated by: Taqueria Juarez DO on 3/06/2019 at 10:52     Approved by:  Taqueria Juarez DO

## 2019-03-06 NOTE — CONSULTS
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 There is no fracture, subluxation or dislocation. There is mild soft tissue swelling in the lower leg and ankle. Osseous structures appear somewhat demineralized.     =====  CONCLUSION:  Soft tissue swelling without evidence for acute fracture. changes in the knee predominately involving the medial and patellofemoral joint compartments. No fracture or dislocation.         Dictated by: Que Vizcarra MD on 3/05/2019 at 22:46       Approved by: Que Vizcarra MD                ASSESSMENT AN

## 2019-03-07 PROCEDURE — 96376 TX/PRO/DX INJ SAME DRUG ADON: CPT

## 2019-03-07 PROCEDURE — 96372 THER/PROPH/DIAG INJ SC/IM: CPT

## 2019-03-07 NOTE — PROGRESS NOTES
[facility] Hospitalist Progress Note     PCP: Christal Oviedo MD    CC:  Follow up    SUBJECTIVE:  Pt sitting up in bed,  at bedside. Polyarticular pain much improved. Says developed rash to inner thigh and back side x2. No pruritis or pain.   Says nicole cephALEXin  250 mg Oral Daily       acetaminophen, morphINE sulfate **OR** morphINE sulfate **OR** morphINE sulfate       Assessment/Plan:       #Inflammatory Polyarthritis  -MRI ankle confirming above  -rheumatology on consult, appreciate  -IV steroids pe

## 2019-03-07 NOTE — PROGRESS NOTES
03/07/19 1033   Clinical Encounter Type   Visited With Patient   Sacramental Encounters   Sacrament of Sick-Anointing Anointed   The patient was seen by Malcom Jin. Received prayer, Scripture, support and Sacrament of the Sick.

## 2019-03-07 NOTE — PROGRESS NOTES
Pt A&Ox4  Admit for intractable pain/polyarthritis  Rheum on consult  IV steroids  Morphine for break through pain  MRI completed L ankle  CXR completed today-- COPD??- pt not previously diagnosed  Pt up with SBA and walker  Regular diet  RA  Will continue

## 2019-03-07 NOTE — PLAN OF CARE
COPING    • Pt/Family able to verbalize concerns and demonstrate effective coping strategies Progressing        DISCHARGE PLANNING    • Discharge to home or other facility with appropriate resources Progressing        Impaired Activities of Daily Living

## 2019-03-07 NOTE — PROGRESS NOTES
Rheumatology Progress Note     SUBJECTIVE:  Interval History:  Much improved on solumedrol  - new localized rash without pain or pruritus on left inner thigh and gluteal area  - 90% improvement in joint pain, swelling resolved  - no other complaints     OB today, convert to medrol taper for discharge tomorrow  - needs one more day of iv to be sure flare up is resolved, frequent recurrences and readmissions in past      Rianna Dozier MD  3/7/2019  7:10 AM

## 2019-03-07 NOTE — CM/SW NOTE
03/07/19 1100   CM/SW Referral Data   Referral Source Social Work (self-referral)   Reason for Referral Discharge planning   Informant Patient;Spouse   Patient Info   Patient's Mental Status Oriented; Alert   Patient's Home Environment Senior Independent

## 2019-03-08 VITALS
DIASTOLIC BLOOD PRESSURE: 59 MMHG | RESPIRATION RATE: 18 BRPM | BODY MASS INDEX: 25 KG/M2 | HEART RATE: 94 BPM | WEIGHT: 159.69 LBS | OXYGEN SATURATION: 95 % | SYSTOLIC BLOOD PRESSURE: 166 MMHG | TEMPERATURE: 98 F

## 2019-03-08 PROCEDURE — 96376 TX/PRO/DX INJ SAME DRUG ADON: CPT

## 2019-03-08 RX ORDER — METHYLPREDNISOLONE 4 MG/1
32 TABLET ORAL 2 TIMES DAILY
Status: DISCONTINUED | OUTPATIENT
Start: 2019-03-08 | End: 2019-03-08

## 2019-03-08 NOTE — PLAN OF CARE
NURSING DISCHARGE NOTE    Discharged Home via Wheelchair. Accompanied by Spouse  Belongings Taken by patient/family. Discharge paperwork discussed with patient including follow up appointments and medications. IV removed.  All of patient's questions

## 2019-03-08 NOTE — PLAN OF CARE
COPING     • Pt/Family able to verbalize concerns and demonstrate effective coping strategies Progressing           DISCHARGE PLANNING     • Discharge to home or other facility with appropriate resources Progressing           Impaired Activities of Daily L

## 2019-03-08 NOTE — PROGRESS NOTES
Rheumatology Progress Note     SUBJECTIVE:  - mild pain in left ankle, but able to ambulate  - no joint swelling  - other joint pain resolved  - rash is stable on left leg, no progression    OBJECTIVE:    Allergy:  Actonel [Risedronat*        Comment:Stopp

## 2019-03-08 NOTE — PLAN OF CARE
DISCHARGE PLANNING    • Discharge to home or other facility with appropriate resources Progressing          Impaired Activities of Daily Living    • Achieve highest/safest level of independence in self care Progressing          Impaired Functional Mobility

## 2019-03-08 NOTE — PHYSICAL THERAPY NOTE
Attempted to see Pt this AM - RN aware of attempt. Pt d/c back to [facility] soon per RNDouglas f/u later today if time permits, after all other patients are attempted per tentative schedule.

## 2019-03-08 NOTE — PLAN OF CARE
COPING    • Pt/Family able to verbalize concerns and demonstrate effective coping strategies Completed        DISCHARGE PLANNING    • Discharge to home or other facility with appropriate resources Completed        Impaired Activities of Daily Living    • A

## 2019-03-08 NOTE — DISCHARGE SUMMARY
General Medicine Discharge Summary     Patient ID:  Jacey Rosado  [de-identified]year old  [id number]  8/20/1940    Admit date: 3/5/2019    Discharge date and time: 3/8/19    Attending Physician: Dhara San, *     Primary Care Physician: Kinza Payton, flare  -ok for tacrolimus ointment.       **anemia-chronic-stable               Consults: IP CONSULT TO PHYSICAL THERAPY  IP CONSULT TO RHEUMATOLOGY    Radiology:  Xr Chest Pa + Lat Chest (cpt=71046)    Result Date: 3/6/2019  PROCEDURE:  XR CHEST PA + LAT C PATIENT STATED HISTORY: (As transcribed by Technologist)  arthritic joint pain. Pt denies trauma    FINDINGS:  There is moderate medial and patellofemoral compartment narrowing and mild lateral compartment narrowing.   There is mild spurring of the medial fe gadolinium contrast.  PATIENT STATED HISTORY:(As transcribed by Technologist)  Patient complains of left ankle pain, limited mobility, history of inflammatory polyarthritis.    CONTRAST USED:  15 mL of Dotarem  FINDINGS:  JOINT COMPARTMENTS:  There are mild TABLETS (1,000 MG TOTAL) BY MOUTH 2 (TWO) TIMES DAILY. Adalimumab (HUMIRA PEN) 40 MG/0.8ML Subcutaneous Pen-injector Kit  inject 40 mg into the skin every 7 days    cephALEXin (KEFLEX) 250 MG Oral Cap  Take 1 capsule (250 mg total) by mouth daily.     geoffrey 10:15 AM  [facility] OPHTHALMOLOGY   [address] [facility] [address]   [phone]    Apr16 FOLLOW UP with Madelyn Juarez MD   Tuesday Apr 16, 2019 10:00 AM  EM [facility] RHEUM   [address] [facility]

## 2019-04-12 NOTE — PLAN OF CARE
A/O x4. VSS. Room air.   Getting PO keflex for recent UTI  Q12 solumedrol  Denies pain at this time  Up to void w/ walker & SB assist  Ointment applied to skin rash  Plan: possible D/C, to go back to [facility]    Denies needs at this time; will continue to Neuro

## 2019-05-07 NOTE — CM/SW NOTE
11/14/17 1400   CM/SW Referral Data   Referral Source   (case finding, re-admission)   Reason for Referral Discharge planning;Readmission   Informant Spouse;Patient   Pertinent Medical Hx   Primary Care Physician Name Concepcion Thrasherroscoe   Patient Info Pt and spouse aware she is in observation status currently. CM/SW will follow and assist with discharge plans. Yes

## 2019-11-11 PROBLEM — I70.0 AORTIC ATHEROSCLEROSIS: Status: RESOLVED | Noted: 2018-06-27 | Resolved: 2019-11-11

## 2019-11-11 PROBLEM — R10.9 ABDOMINAL PAIN, ACUTE: Status: RESOLVED | Noted: 2017-12-07 | Resolved: 2019-11-11

## 2019-11-11 PROBLEM — I70.0 AORTIC ATHEROSCLEROSIS (HCC): Status: RESOLVED | Noted: 2018-06-27 | Resolved: 2019-11-11

## 2019-11-11 PROBLEM — R19.7 DIARRHEA, UNSPECIFIED TYPE: Status: RESOLVED | Noted: 2017-12-07 | Resolved: 2019-11-11

## 2019-11-11 PROBLEM — E87.6 HYPOKALEMIA: Status: RESOLVED | Noted: 2017-11-13 | Resolved: 2019-11-11

## 2019-11-11 PROBLEM — R11.2 NAUSEA & VOMITING: Status: RESOLVED | Noted: 2017-10-12 | Resolved: 2019-11-11

## 2019-11-11 PROBLEM — R11.2 NAUSEA AND VOMITING, INTRACTABILITY OF VOMITING NOT SPECIFIED, UNSPECIFIED VOMITING TYPE: Status: RESOLVED | Noted: 2017-12-07 | Resolved: 2019-11-11

## 2020-09-30 ENCOUNTER — HOSPITAL ENCOUNTER (EMERGENCY)
Facility: HOSPITAL | Age: 80
Discharge: NURSING, REHAB OR LONG-TERM CARE | End: 2020-09-30
Attending: EMERGENCY MEDICINE
Payer: MEDICARE

## 2020-09-30 VITALS
HEIGHT: 67 IN | HEART RATE: 75 BPM | RESPIRATION RATE: 16 BRPM | WEIGHT: 165 LBS | DIASTOLIC BLOOD PRESSURE: 75 MMHG | BODY MASS INDEX: 25.9 KG/M2 | TEMPERATURE: 98 F | SYSTOLIC BLOOD PRESSURE: 178 MMHG | OXYGEN SATURATION: 96 %

## 2020-09-30 DIAGNOSIS — H20.9 UVEITIS OF LEFT EYE: Primary | ICD-10-CM

## 2020-09-30 PROCEDURE — 99283 EMERGENCY DEPT VISIT LOW MDM: CPT

## 2020-09-30 RX ORDER — CYCLOPENTOLATE HYDROCHLORIDE 10 MG/ML
1 SOLUTION/ DROPS OPHTHALMIC 2 TIMES DAILY
Qty: 1 BOTTLE | Refills: 0 | Status: SHIPPED | OUTPATIENT
Start: 2020-09-30

## 2020-09-30 NOTE — ED NOTES
Patient has been updated with plan of care. Assisted in contacting Yuki Shepherd, informed bus could come get patient. Patient alert and appropriate. All questions answered.

## 2020-09-30 NOTE — ED PROVIDER NOTES
Patient Seen in: [facility] Emergency Department      History   Patient presents with:   Eye Visual Problem    Stated Complaint: Eye swelling    HPI    78-year-old female is coming the hospital complaint having difficulty with pain around the area of ileum wnl   • COLONOSCOPY,DIAGNOSTIC  1990    mild inflammatory change sigmoid colon   • COLONOSCOPY,DIAGNOSTIC  2/3/10    Dr. Angy Hollingsworth, mild erythema of the rectosigmoid, possibly a variant of normal, bx show collagenous colitis, ileum normal   • [facility] w/BSO   • UP GI ENDOSCOPY,BALL DIL,30MM  6/12/14    distal esophageal stricture, bx and dilated 18-20 mm   • UPPER GI ENDOSCOPY,BIOPSY  2/10    3 gastric ulcers from diclofenac, bx for H. pylori was negative   • UPPER GI ENDOSCOPY,DIAGNOSIS  1990    w diagnosis)    Disposition:  Discharge  9/30/2020  1:59 pm    Follow-up:  Master Estrada MD  [address] [place] [id number] [phone]    Schedule an appointment as soon as possible for a visit in 1 day            Medication

## 2020-09-30 NOTE — ED INITIAL ASSESSMENT (HPI)
Pt present via ems with L eye swelling, seen by multiple opthamaologists, redness and swelling noted.  Drainage noted as well

## 2021-03-05 PROBLEM — M54.9 INTRACTABLE BACK PAIN: Status: RESOLVED | Noted: 2017-11-13 | Resolved: 2021-03-05

## 2021-03-05 PROBLEM — M80.00XA OSTEOPOROSIS WITH CURRENT PATHOLOGICAL FRACTURE: Status: RESOLVED | Noted: 2018-01-04 | Resolved: 2021-03-05

## 2021-03-05 PROBLEM — R79.89 AZOTEMIA: Status: RESOLVED | Noted: 2017-11-13 | Resolved: 2021-03-05

## 2021-03-05 PROBLEM — M48.50XA VERTEBRAL COMPRESSION FRACTURE (HCC): Status: RESOLVED | Noted: 2017-10-17 | Resolved: 2021-03-05

## 2021-03-05 PROBLEM — M54.9 BACK PAIN WITHOUT RADIATION: Status: RESOLVED | Noted: 2017-10-06 | Resolved: 2021-03-05

## 2021-03-05 PROBLEM — M25.50 JOINT PAIN: Status: RESOLVED | Noted: 2019-03-05 | Resolved: 2021-03-05

## 2021-03-05 PROBLEM — M54.9 BACK PAIN: Status: RESOLVED | Noted: 2017-10-08 | Resolved: 2021-03-05

## 2021-03-05 PROBLEM — R51.9 LEFT FACIAL PRESSURE AND PAIN: Status: RESOLVED | Noted: 2017-09-20 | Resolved: 2021-03-05

## 2021-03-05 PROBLEM — D64.9 ANEMIA: Status: RESOLVED | Noted: 2017-11-13 | Resolved: 2021-03-05

## 2021-03-24 PROBLEM — D84.821 IMMUNODEFICIENCY DUE TO LONG TERM DRUG THERAPY  (HCC): Status: ACTIVE | Noted: 2021-03-24

## 2021-03-24 PROBLEM — Z79.899 IMMUNODEFICIENCY DUE TO LONG TERM DRUG THERAPY (HCC): Status: ACTIVE | Noted: 2021-03-24

## 2021-03-24 PROBLEM — Z79.899 IMMUNODEFICIENCY DUE TO LONG TERM DRUG THERAPY  (HCC): Status: ACTIVE | Noted: 2021-03-24

## 2021-03-24 PROBLEM — D84.821 IMMUNODEFICIENCY DUE TO LONG TERM DRUG THERAPY: Status: ACTIVE | Noted: 2021-03-24

## 2021-03-24 PROBLEM — Z79.899 IMMUNODEFICIENCY DUE TO LONG TERM DRUG THERAPY: Status: ACTIVE | Noted: 2021-03-24

## 2021-03-24 PROBLEM — D84.821 IMMUNODEFICIENCY DUE TO LONG TERM DRUG THERAPY (HCC): Status: ACTIVE | Noted: 2021-03-24

## 2021-07-23 PROBLEM — I10 ESSENTIAL HYPERTENSION: Status: ACTIVE | Noted: 2021-07-23

## 2021-07-23 PROBLEM — R06.09 EXERTIONAL DYSPNEA: Status: ACTIVE | Noted: 2021-07-23

## 2021-07-23 PROBLEM — R06.00 EXERTIONAL DYSPNEA: Status: ACTIVE | Noted: 2021-07-23

## 2021-07-23 PROBLEM — R00.2 PALPITATIONS: Status: ACTIVE | Noted: 2021-07-23

## 2021-07-23 PROBLEM — I25.10 ATHEROSCLEROSIS OF NATIVE CORONARY ARTERY OF NATIVE HEART: Status: ACTIVE | Noted: 2021-07-23

## 2021-08-11 PROCEDURE — 88313 SPECIAL STAINS GROUP 2: CPT | Performed by: INTERNAL MEDICINE

## 2021-12-03 ENCOUNTER — ORDER TRANSCRIPTION (OUTPATIENT)
Dept: ADMINISTRATIVE | Facility: HOSPITAL | Age: 81
End: 2021-12-03

## 2021-12-03 DIAGNOSIS — Z01.818 PREOP EXAMINATION: Primary | ICD-10-CM

## 2021-12-03 DIAGNOSIS — Z11.59 ENCOUNTER FOR SCREENING FOR OTHER VIRAL DISEASES: ICD-10-CM

## 2022-01-03 ENCOUNTER — LAB ENCOUNTER (OUTPATIENT)
Dept: LAB | Age: 82
End: 2022-01-03
Attending: INTERNAL MEDICINE
Payer: MEDICARE

## 2022-01-03 DIAGNOSIS — Z11.59 ENCOUNTER FOR SCREENING FOR OTHER VIRAL DISEASES: ICD-10-CM

## 2022-01-03 DIAGNOSIS — Z01.818 PREOP EXAMINATION: ICD-10-CM

## 2022-01-05 LAB — SARS-COV-2 RNA RESP QL NAA+PROBE: NOT DETECTED

## 2022-01-05 NOTE — PROGRESS NOTES
The nasal swab test for the the COVID-19 virus was negative. You may proceed with the planned endoscopy at the [address] [place] [address].     Bailee Weaver MD MD, 01/05/22, 4:25 PM

## 2022-01-06 ENCOUNTER — HOSPITAL ENCOUNTER (OUTPATIENT)
Dept: GENERAL RADIOLOGY | Facility: HOSPITAL | Age: 82
Discharge: HOME | End: 2022-01-06
Attending: INTERNAL MEDICINE
Payer: MEDICARE

## 2022-01-06 DIAGNOSIS — R13.12 OROPHARYNGEAL DYSPHAGIA: ICD-10-CM

## 2022-01-06 PROCEDURE — 92611 MOTION FLUOROSCOPY/SWALLOW: CPT

## 2022-01-06 PROCEDURE — 74230 X-RAY XM SWLNG FUNCJ C+: CPT | Performed by: INTERNAL MEDICINE

## 2022-01-06 NOTE — PROGRESS NOTES
ADULT VIDEOFLUOROSCOPIC SWALLOWING STUDY    Admission Date: 1/6/2022  Evaluation Date: 01/06/22  Radiologist: Dr Barb Schuster   Diet Recommendations - Solids: Regular  Diet Recommendations - Liquids:  Thin Liquids  Compensatory Strategies Dane Independent  Prior Living Situation: Home with support  History of Recent: No recent respiratory difficulty     Imaging results: as per above    Reason for Referral: R/O aspiration      Family/Patient Goals: cause for dysphagia    ASSESSMENT   DYSPHAGIA AS please contact Huang Sanford, SLP  Martina Yeung Taj 87 CCC-SLP/L, pager [phone]  Speech-LanguagePathologist

## 2022-01-07 NOTE — PROGRESS NOTES
Noted - results were reviewed with the pt by the speech pathologist at the time of the swallow study.

## 2022-03-07 PROBLEM — I47.10 PSVT (PAROXYSMAL SUPRAVENTRICULAR TACHYCARDIA) (HCC): Status: ACTIVE | Noted: 2022-03-07

## 2022-03-07 PROBLEM — I47.1 PSVT (PAROXYSMAL SUPRAVENTRICULAR TACHYCARDIA) (HCC): Status: ACTIVE | Noted: 2022-03-07

## 2022-03-07 PROBLEM — I47.10 PSVT (PAROXYSMAL SUPRAVENTRICULAR TACHYCARDIA): Status: ACTIVE | Noted: 2022-03-07

## 2022-03-23 PROBLEM — M45.0 ANKYLOSING SPONDYLITIS OF MULTIPLE SITES IN SPINE (HCC): Status: ACTIVE | Noted: 2022-03-23

## 2022-08-24 ENCOUNTER — ORDER TRANSCRIPTION (OUTPATIENT)
Dept: PHYSICAL THERAPY | Facility: HOSPITAL | Age: 82
End: 2022-08-24

## 2022-08-24 DIAGNOSIS — M45.0 ANKYLOSING SPONDYLITIS OF MULTIPLE SITES IN SPINE (HCC): Primary | ICD-10-CM

## 2022-08-26 ENCOUNTER — OFFICE VISIT (OUTPATIENT)
Dept: PHYSICAL THERAPY | Age: 82
End: 2022-08-26
Attending: INTERNAL MEDICINE
Payer: MEDICARE

## 2022-08-26 PROCEDURE — 97116 GAIT TRAINING THERAPY: CPT

## 2022-08-26 PROCEDURE — 97110 THERAPEUTIC EXERCISES: CPT

## 2022-08-26 PROCEDURE — 97163 PT EVAL HIGH COMPLEX 45 MIN: CPT

## 2022-08-30 ENCOUNTER — OFFICE VISIT (OUTPATIENT)
Dept: PHYSICAL THERAPY | Age: 82
End: 2022-08-30
Attending: INTERNAL MEDICINE
Payer: MEDICARE

## 2022-08-30 PROCEDURE — 97110 THERAPEUTIC EXERCISES: CPT

## 2022-08-30 PROCEDURE — 97112 NEUROMUSCULAR REEDUCATION: CPT

## 2022-08-30 PROCEDURE — 97140 MANUAL THERAPY 1/> REGIONS: CPT

## 2022-08-30 NOTE — PROGRESS NOTES
Diagnosis:   Anklyosing Spondylitis,   Balance disorder    Referring Provider: Chidi Mai  Date of Evaluation:    8/26    Precautions:  Increased fall risk, visual impairment, h/o compression fx Next MD visit:   none scheduled  Date of Surgery: n/a   Insurance Primary/Secondary: Becky Garvin / N/A     # Auth Visits: 10          Subjective: Was able to go to pool on Saturday. Hasn't been able to do tendon glides of hands as she has had so much pain. Has taken 3 Tylenol today. Patient's  purchased HP for her hands and drove her as she cannot see to drive. Pain:      0 /10   R arm  ( goes up to 6 or 7)       Objective:  Arrived with [facility]   R elbow flex to 130 deg        Assessment: Problem list: decreased balance, acute R hand and forearm pain , R shoulder weakness, decreased LE strength      R shoulder weakness and pain limits ability to dress and reach arm overhead for grooming. Pt is acutely tender at R hand from arthritis that limits ability to , complete fine motor activities and self care. LE weakness limits sit to stand transfers and transfers in and out of car. Goals: (to be met in 10 visits)   * Patient is able to transfer sit to stand with UE support with min difficulty   * Improve modified  tandem stance to 20 sec to improve balance to  allow patient to complete changes in direction in gait safely  * Improve R hand ROM/decrease pain to allow patient to be able to improve  to be able to take glasses on and off  * Improve R shoulder abd and ER 1 grade to allow patient to touch the back of her head. Plan: Cont 9/9 at 10:45 with joint mobs, balance and gait activities, R UE strengthening. . Pt is on wait list  Date: 8/30/2022  TX#: 2/10 Date:                 TX#: 3/ Date:                 TX#: 4/ Date:                 TX#: 5/ Date:    Tx#: 6/   Nustep: st 11:   5 mins       Sit to stand:  x5 w/ UE support ( limited by R hand pain)       Graded imagery: using mirror for L wrist and finger ROM to assist R   - AROM L tendon glides, opposition, rolling ball as lotus by pain           Manual: Gr II  GH mobs, inferior  to increase abd  - GR II joint traction to 2-4th DIP and PIP joints   - metacarpal gr II mobs        Balance: heels together with C rot R/L   -tandem stance with C rot R/L x8 each  - alt march with UE support x8 R/L         HP to bilateral hands 10 mins         side step R/L 10' x4        HEP: tendon glides, sit to stand , resume aquatic exercises , practice large  squeeze.    8/30/2022  Standing heel raises with UE support 2x10,  Stand with heels together C rot R/L     Charges: m, ex2, neuroreed       Total Timed Treatment: 55 +10  min  Total Treatment Time: 65* min

## 2022-08-31 ENCOUNTER — TELEPHONE (OUTPATIENT)
Dept: PHYSICAL THERAPY | Facility: HOSPITAL | Age: 82
End: 2022-08-31

## 2022-09-01 ENCOUNTER — OFFICE VISIT (OUTPATIENT)
Dept: PHYSICAL THERAPY | Age: 82
End: 2022-09-01
Attending: INTERNAL MEDICINE
Payer: MEDICARE

## 2022-09-01 PROCEDURE — 97140 MANUAL THERAPY 1/> REGIONS: CPT

## 2022-09-01 PROCEDURE — 97112 NEUROMUSCULAR REEDUCATION: CPT

## 2022-09-01 PROCEDURE — 97110 THERAPEUTIC EXERCISES: CPT

## 2022-09-01 NOTE — PROGRESS NOTES
Diagnosis:   Anklyosing Spondylitis,   Balance disorder    Referring Provider: No ref. provider found  Date of Evaluation:    8/26    Precautions:  Increased fall risk, visual impairment, h/o compression fx Next MD visit:   none scheduled  Date of Surgery: n/a   Insurance Primary/Secondary: [address] [place],9D / N/A     # Auth Visits: 10          Subjective: Having a lot of vision problem today . Shaheen Chakraborty Hasn't been able to do tendon glides of hands as she has had so much pain. Has taken 3 Tylenol today. Patient's  purchased HP for her hands and drove her as she cannot see to drive. L hand is doing well. Feels unsteady in standing, and on curbs  and is afraid of falling. Pain:      0 /10   R arm  ( goes up to 6 or 7)       Objective:  Arrived with [facility]   R elbow flex to 135 deg  R shoulder AROM flexion to 140         Assessment: Problem list: decreased balance, acute R hand and forearm pain , R shoulder weakness, decreased LE strength   Improved R shoulder AROM noted with flex improved to 140 deg (was 95 deg). Pain in R hand  was most acute at  3rd PIP w/ redness at edema noted. Pt is acutely tender at R hand from arthritis that limits ability to , complete fine motor activities and self care. LE weakness limits sit to stand transfers and transfers in and out of car. Goals: (to be met in 10 visits)   * Patient is able to transfer sit to stand with UE support with min difficulty   * Improve modified  tandem stance to 20 sec to improve balance to  allow patient to complete changes in direction in gait safely  * Improve R hand ROM/decrease pain to allow patient to be able to improve  to be able to take glasses on and off  * Improve R shoulder abd and ER 1 grade to allow patient to touch the back of her head. Plan: Cont Fri 9/9 at 10:45 with joint mobs, balance and gait activities, R UE strengthening.   Date: 8/30/2022  TX#: 2/10 Date:   9/1/2022              TX#: 3/10 Date: TX#: 4/ Date:                 TX#: 5/ Date: Tx#: 6/   Nustep: st 11:   5 mins Nustep 6 mins       Sit to stand:  x5 w/ UE support ( limited by R hand pain) Stance with small perturbations ( difficult)      Graded imagery: using mirror for L wrist and finger ROM to assist R   - AROM L tendon glides, opposition, rolling ball as lotus by pain  Vestibular board: a/p and balance with UE support and SBA  - ant step up R/L x8 onto 4\" step with UE support          Manual: Gr II  GH mobs, inferior  to increase abd  - GR II joint traction to 2-4th DIP and PIP joints   - metacarpal gr II mobs  2-4th DIP and PIP joints   - metacarpal gr II mobs     Instructed on diaphragmatic breathing  Cont posture correction, shoulder circles back       Balance: heels together with C rot R/L   -tandem stance with C rot R/L x8 each  - alt march with UE support x8 R/L  Balance: heels together with C rot R/L        HP to bilateral hands 10 mins  Graded imagery: using mirror for L wrist and finger ROM to assist R   - AROM L tendon glides, opposition, rolling ball as lotus by pain        side step R/L 10' x4        HEP: tendon glides, sit to stand , resume aquatic exercises , practice large  squeeze.    8/30/2022  Standing heel raises with UE support 2x10,  Stand with heels together C rot R/L     Charges: m, ex, neuroreed       Total Timed Treatment: 45 +10  min  Total Treatment Time: 45* min

## 2022-09-06 ENCOUNTER — TELEPHONE (OUTPATIENT)
Dept: PHYSICAL THERAPY | Facility: HOSPITAL | Age: 82
End: 2022-09-06

## 2022-09-09 ENCOUNTER — OFFICE VISIT (OUTPATIENT)
Dept: PHYSICAL THERAPY | Age: 82
End: 2022-09-09
Attending: INTERNAL MEDICINE
Payer: MEDICARE

## 2022-09-09 DIAGNOSIS — M45.0 ANKYLOSING SPONDYLITIS OF MULTIPLE SITES IN SPINE (HCC): ICD-10-CM

## 2022-09-09 PROCEDURE — 97140 MANUAL THERAPY 1/> REGIONS: CPT

## 2022-09-09 PROCEDURE — 97112 NEUROMUSCULAR REEDUCATION: CPT

## 2022-09-09 PROCEDURE — 97110 THERAPEUTIC EXERCISES: CPT

## 2022-09-09 NOTE — PROGRESS NOTES
Diagnosis:   Anklyosing Spondylitis,   Balance disorder    Referring Provider: Margret Cuba  Date of Evaluation:    8/26    Precautions:  Increased fall risk, visual impairment, h/o compression fx Next MD visit:   none scheduled  Date of Surgery: n/a   Insurance Primary/Secondary: Fior Steinberg / N/A     # Auth Visits: 10          Subjective: R hand felt better after last tx for a couple days Still having trouble with emailing. .. Hasn't been able to do tendon glides of hands as she has had so much pain. Has taken 3 Tylenol today. .   L hand is doing well. Feels unsteady in standing, and on curbs  and is afraid of falling. Was able to hook her bra, can't put R hand behind head. Pain:      0 /10   R arm  ( goes up to 6 or 7)       Objective:  Arrived with [facility]   R elbow flex to 135 deg  R shoulder AROM flexion to 140   + neural tension R median and ulnar nerve. Assessment: Problem list: decreased balance, acute R hand and forearm pain , R shoulder weakness, + R median neural tension,  decreased LE strength   . Pain in R hand  was most acute at  3rd PIP w/ redness at edema noted. Pt is acutely tender at R hand from arthritis that limits ability to , complete fine motor activities and self care. LE weakness limits sit to stand transfers and transfers in and out of car. Goals: (to be met in 10 visits)   * Patient is able to transfer sit to stand with UE support with min difficulty   * Improve modified  tandem stance to 20 sec to improve balance to  allow patient to complete changes in direction in gait safely  * Improve R hand ROM/decrease pain to allow patient to be able to improve  to be able to take glasses on and off  * Improve R shoulder abd and ER 1 grade to allow patient to touch the back of her head. Plan: Cont M  9/19 at 10:00 with joint mobs, balance and gait activities, R UE strengthening.   Date: 8/30/2022  TX#: 2/10 Date:   9/1/2022              TX#: 3/10 Date: 9/9/2022               TX#: 4/10 Date:                 TX#: 5/ Date: Tx#: 6/   Nustep: st 11:   5 mins Nustep 6 mins  Nustep 6 mins , L4 ,seat 10      Sit to stand:  x5 w/ UE support ( limited by R hand pain) Stance with small perturbations ( difficult) Side step R/L  In PB x6      Graded imagery: using mirror for L wrist and finger ROM to assist R   - AROM L tendon glides, opposition, rolling ball as lotus by pain  Vestibular board: a/p and balance with UE support and SBA  - ant step up R/L x8 onto 4\" step with UE support  Vestibular board: a/p and balance with UE support and SBA  - ant step up R/L x8 onto 6\" step with UE suppor        Manual: Gr II  GH mobs, inferior  to increase abd  - GR II joint traction to 2-4th DIP and PIP joints   - metacarpal gr II mobs  2-4th DIP and PIP joints   - metacarpal gr II mobs     Instructed on diaphragmatic breathing  Cont posture correction, shoulder circles back  Sit to stand from elevated table. x5  R wrist dorsal and volar mobs GR II, gentle stretch into R wrist flexion,   Metacarpal mobs      Balance: heels together with C rot R/L   -tandem stance with C rot R/L x8 each  - alt march with UE support x8 R/L  Balance: heels together with C rot R/L  R GH joint mobs GR II-III, gentle ROM in flexion   R elbow flex/ext x20       HP to bilateral hands 10 mins  Graded imagery: using mirror for L wrist and finger ROM to assist R   - AROM L tendon glides, opposition, rolling ball as lotus by pain  Graded imagery: using mirror for L wrist and finger ROM to assist R   - AROM L tendon glides, opposition, rolling ball as lotus by pain       side step R/L 10' x4        HEP: tendon glides, sit to stand , resume aquatic exercises , practice large  squeeze.    8/30/2022  Standing heel raises with UE support 2x10,  Stand with heels together C rot R/L     Charges: m, ex, neuroreed       Total Timed Treatment: 45 +10  Min HP  Total Treatment Time: 45* min

## 2022-09-19 ENCOUNTER — OFFICE VISIT (OUTPATIENT)
Dept: PHYSICAL THERAPY | Age: 82
End: 2022-09-19
Attending: INTERNAL MEDICINE
Payer: MEDICARE

## 2022-09-19 DIAGNOSIS — M45.0 ANKYLOSING SPONDYLITIS OF MULTIPLE SITES IN SPINE (HCC): ICD-10-CM

## 2022-09-19 PROCEDURE — 97140 MANUAL THERAPY 1/> REGIONS: CPT

## 2022-09-19 PROCEDURE — 97110 THERAPEUTIC EXERCISES: CPT

## 2022-09-19 PROCEDURE — 97112 NEUROMUSCULAR REEDUCATION: CPT

## 2022-09-19 NOTE — PROGRESS NOTES
Diagnosis:   Anklyosing Spondylitis,   Balance disorder    Referring Provider: Rasheed Tlelez  Date of Evaluation:    8/26    Precautions:  Increased fall risk, visual impairment, h/o compression fx Next MD visit:   none scheduled  Date of Surgery: n/a   Insurance Primary/Secondary: [address] [place],9D / N/A     # Auth Visits: 10          Subjective: R hand is irritated by computer. Still having trouble with emailing. .. Hasn't been able to do tendon glides of hands as she has had so much pain. No Tylenol today. .   L hand is doing much better. .  Feels unsteady in standing, and on curbs  and is afraid of falling. Able to hook her bra, can't put R hand behind head or hook jewelery. Concerned about inflammation in R PIP of hand. Hasn't contacted Dr. Rahel Brooks.     Pain:      0 /10   R arm  ( goes up to 6 or 7)       Objective:  Arrived with [facility]   R elbow flex to 135 deg  R shoulder AROM flexion to 140   + neural tension R median and ulnar nerve. Assessment:  R middle finger still experiencing arthritic flare. Problem list: decreased balance, acute R hand and forearm pain , R shoulder weakness, + R median neural tension,  decreased LE strength   . Pain in R hand  was most acute at  3rd PIP w/ redness at edema noted. Pt is acutely tender at R hand from arthritis that limits ability to , complete fine motor activities and self care. LE weakness limits sit to stand transfers and transfers in and out of car. Goals: (to be met in 10 visits)   * Patient is able to transfer sit to stand with UE support with min difficulty   * Improve modified  tandem stance to 20 sec to improve balance to  allow patient to complete changes in direction in gait safely  * Improve R hand ROM/decrease pain to allow patient to be able to improve  to be able to take glasses on and off  * Improve R shoulder abd and ER 1 grade to allow patient to touch the back of her head.       Plan: Cont F at 10:00 with joint mobs, balance and gait activities, R UE strengthening. Date: 8/30/2022  TX#: 2/10 Date:   9/1/2022              TX#: 3/10 Date:  9/9/2022               TX#: 4/10 Date:   9/19/2022              TX#: 5/10 Date: 9/19/2022  Tx#: 6/10   Nustep: st 11:   5 mins Nustep 6 mins  Nustep 6 mins , L4 ,seat 10  Nustep  7 mins warm up. Sit to stand:  x5 w/ UE support ( limited by R hand pain) Stance with small perturbations ( difficult) Side step R/L  In PB x6  Sit to stand x5     Graded imagery: using mirror for L wrist and finger ROM to assist R   - AROM L tendon glides, opposition, rolling ball as lotus by pain  Vestibular board: a/p and balance with UE support and SBA  - ant step up R/L x8 onto 4\" step with UE support  Vestibular board: a/p and balance with UE support and SBA  - ant step up R/L x8 onto 6\" step with UE suppor Vestibular board: a/p and balance with UE support and SBA  - ant step up on 6\" x10  - alt heel taps R/L x10        Manual: Gr II  GH mobs, inferior  to increase abd  - GR II joint traction to 2-4th DIP and PIP joints   - metacarpal gr II mobs  2-4th DIP and PIP joints   - metacarpal gr II mobs     Instructed on diaphragmatic breathing  Cont posture correction, shoulder circles back  Sit to stand from elevated table.  x5  R wrist dorsal and volar mobs GR II, gentle stretch into R wrist flexion,   Metacarpal mobs  R wrist dorsal and volar mobs GR II, gentle stretch into R wrist flexion,   Metacarpal mobs     Balance: heels together with C rot R/L   -tandem stance with C rot R/L x8 each  - alt march with UE support x8 R/L  Balance: heels together with C rot R/L  R GH joint mobs GR II-III, gentle ROM in flexion   R elbow flex/ext x20     Ball pass behind back x10 R/L  - Weight bearing R hand , circles with      HP to bilateral hands 10 mins  Graded imagery: using mirror for L wrist and finger ROM to assist R   - AROM L tendon glides, opposition, rolling ball as lotus by pain  Graded imagery: using mirror for L wrist and finger ROM to assist R   - AROM L tendon glides, opposition, rolling ball as lotus by pain  Balance: heels together with C rot R/L   - airex for stance and then C rot R/L      side step R/L 10' x4    AAROM R shoulder in flexion /abd  - R shoulder ER x12    HEP: tendon glides, sit to stand , resume aquatic exercises , practice large  squeeze.    8/30/2022  Standing heel raises with UE support 2x10,  Stand with heels together C rot R/L     Charges: m, ex, neuroreed       Total Timed Treatment: 45 +10  Min HP  Total Treatment Time: 45* min

## 2022-09-23 ENCOUNTER — OFFICE VISIT (OUTPATIENT)
Dept: PHYSICAL THERAPY | Age: 82
End: 2022-09-23
Attending: INTERNAL MEDICINE
Payer: MEDICARE

## 2022-09-23 DIAGNOSIS — M45.0 ANKYLOSING SPONDYLITIS OF MULTIPLE SITES IN SPINE (HCC): ICD-10-CM

## 2022-09-23 PROCEDURE — 97110 THERAPEUTIC EXERCISES: CPT

## 2022-09-23 PROCEDURE — 97112 NEUROMUSCULAR REEDUCATION: CPT

## 2022-09-23 NOTE — PROGRESS NOTES
Diagnosis:   Anklyosing Spondylitis,   Balance disorder    Referring Provider: Armando Grijalva  Date of Evaluation:    8/26    Precautions:  Increased fall risk, visual impairment, h/o compression fx Next MD visit:   none scheduled  Date of Surgery: n/a   Insurance Primary/Secondary: Jennifer Salazar / N/A     # Auth Visits: 10          Subjective: Had R finger xrayed. No fx or infection. Needs to start getting infusion for arthritis. Starting Oct10. .  Still having trouble with emailing. .. Hasn't been able to do tendon glides of hands as she has had so much pain. .  Feels unsteady in standing, and on curbs  and is afraid of falling. Able to hook her bra, can't put R hand behind head or hook jewelery. Pain:      0 /10   R arm  ( goes up to 6 or 7)       Objective:  Arrived with [facility]   R elbow flex to 135 deg  R shoulder AROM flexion to 140   + neural tension R median and ulnar nerve. Assessment:  R middle finger still experiencing arthritic flare. R UE overall is very tender to palpation throughout forearm and upper arm. Patient was challenged by standing static and dynamic balance exercises, especially step fwd/step back exercise. Problem list: decreased balance, acute R hand and forearm pain , R shoulder weakness, + R median neural tension,  decreased LE strength   . Pain in R hand  was most acute at  3rd PIP w/ redness at edema noted. Pt is acutely tender at R hand from arthritis that limits ability to , complete fine motor activities and self care. LE weakness limits sit to stand transfers and transfers in and out of car.        Goals: (to be met in 10 visits)   * Patient is able to transfer sit to stand with UE support with min difficulty   * Improve modified  tandem stance to 20 sec to improve balance to  allow patient to complete changes in direction in gait safely  * Improve R hand ROM/decrease pain to allow patient to be able to improve  to be able to take glasses on and off  * Improve R shoulder abd and ER 1 grade to allow patient to touch the back of her head. Plan: Cont F at 10:00 with joint mobs, balance and gait activities, R UE strengthening. Date: 8/30/2022  TX#: 2/10 Date:   9/1/2022              TX#: 3/10 Date:  9/9/2022               TX#: 4/10 Date:   9/19/2022              TX#: 5/10 Date: 9/19/2022  Tx#: 6/10   Nustep: st 11:   5 mins Nustep 6 mins  Nustep 6 mins , L4 ,seat 10  Nustep  7 mins warm up. 8 mins warm up   Sit to stand:  x5 w/ UE support ( limited by R hand pain) Stance with small perturbations ( difficult) Side step R/L  In PB x6  Sit to stand x5      Graded imagery: using mirror for L wrist and finger ROM to assist R   - AROM L tendon glides, opposition, rolling ball as lotus by pain  Vestibular board: a/p and balance with UE support and SBA  - ant step up R/L x8 onto 4\" step with UE support  Vestibular board: a/p and balance with UE support and SBA  - ant step up R/L x8 onto 6\" step with UE suppor Vestibular board: a/p and balance with UE support and SBA  - ant step up on 6\" x10  - alt heel taps R/L x10  Vestibular board: a/p and balance with UE support and SBA  - ant step up on 6\" x10  - alt heel taps R/L x10   W/ UE support      Manual: Gr II  GH mobs, inferior  to increase abd  - GR II joint traction to 2-4th DIP and PIP joints   - metacarpal gr II mobs  2-4th DIP and PIP joints   - metacarpal gr II mobs     Instructed on diaphragmatic breathing  Cont posture correction, shoulder circles back  Sit to stand from elevated table.  x5  R wrist dorsal and volar mobs GR II, gentle stretch into R wrist flexion,   Metacarpal mobs  R wrist dorsal and volar mobs GR II, gentle stretch into R wrist flexion,   Metacarpal mobs  R wrist dorsal and volar mobs GR II, gentle stretch into R wrist flexion,     R AAROM into R shoulder flex/abd   Balance: heels together with C rot R/L   -tandem stance with C rot R/L x8 each  - alt march with UE support x8 R/L  Balance: heels together with C rot R/L  R GH joint mobs GR II-III, gentle ROM in flexion   R elbow flex/ext x20     Ball pass behind back x10 R/L  - Weight bearing R hand , circles with  SLS R, then L, with step forward, step back, UE support, cues for increased hip extension    HP to bilateral hands 10 mins  Graded imagery: using mirror for L wrist and finger ROM to assist R   - AROM L tendon glides, opposition, rolling ball as lotus by pain  Graded imagery: using mirror for L wrist and finger ROM to assist R   - AROM L tendon glides, opposition, rolling ball as lotus by pain  Balance: heels together with C rot R/L   - airex for stance and then C rot R/L  Cont with diaphragmatic breathing. side step R/L 10' x4    AAROM R shoulder in flexion /abd  - R shoulder ER x12 Seated knee ext x10 R/L , seated march x10 R/L    HEP: tendon glides, sit to stand , resume aquatic exercises , practice large  squeeze.    8/30/2022  Standing heel raises with UE support 2x10,  Stand with heels together C rot R/L     Charges: , ex, neuroreed  2     Total Timed Treatment: 45 +10  Min HP  Total Treatment Time: 45* min

## 2022-09-26 ENCOUNTER — OFFICE VISIT (OUTPATIENT)
Dept: PHYSICAL THERAPY | Age: 82
End: 2022-09-26
Attending: INTERNAL MEDICINE
Payer: MEDICARE

## 2022-09-26 ENCOUNTER — APPOINTMENT (OUTPATIENT)
Dept: PHYSICAL THERAPY | Age: 82
End: 2022-09-26
Attending: INTERNAL MEDICINE
Payer: MEDICARE

## 2022-09-26 PROCEDURE — 97110 THERAPEUTIC EXERCISES: CPT

## 2022-09-26 PROCEDURE — 97112 NEUROMUSCULAR REEDUCATION: CPT

## 2022-09-26 PROCEDURE — 97116 GAIT TRAINING THERAPY: CPT

## 2022-09-26 NOTE — PROGRESS NOTES
Diagnosis:   Anklyosing Spondylitis,   Balance disorder    Referring Provider: No ref. provider found  Date of Evaluation:    8/26    Precautions:  Increased fall risk, visual impairment, h/o compression fx Next MD visit:   none scheduled  Date of Surgery: n/a   Insurance Primary/Secondary: Marisa Lobe / N/A     # Auth Visits: 10          Subjective:    . Needs to start getting infusion for arthritis. Starting Oct 7. .   Can't tolerate any therapy to her arm. ..  . Feels unsteady in standing, and on curbs  and is afraid of falling. Wants to focus on balance work. Pain:      0 /10   R arm  ( goes up to 6 or 7)       Objective:  Arrived with [facility]   R elbow flex to 135 deg  R shoulder AROM flexion to 140   + neural tension R median and ulnar nerve. Assessment:  R middle finger still experiencing arthritic flare. R UE overall is very tender to palpation throughout forearm and upper arm. Patient was challenged by standing static and dynamic balance exercises, especially step fwd/step back exercise. Problem list: decreased balance, acute R hand and forearm pain , R shoulder weakness, + R median neural tension,  decreased LE strength   . Pain in R hand  was most acute at  3rd PIP w/ redness at edema noted. .   LE weakness limits sit to stand transfers and transfers in and out of car. Kyphotic posture affects COG and static an dynamic standing balance putting patient at increased risk to fall. She is using her cane for community amb.        Goals: (to be met in 10 visits)   * Patient is able to transfer sit to stand with UE support with min difficulty   * Improve modified  tandem stance to 20 sec to improve balance to  allow patient to complete changes in direction in gait safely  * Improve R hand ROM/decrease pain to allow patient to be able to improve  to be able to take glasses on and off  * Improve R shoulder abd and ER 1 grade to allow patient to touch the back of her head.      Plan: Cont 9/30 at 10:45 with joint mobs, balance and gait activities, R UE strengthening. Date: 8/30/2022  TX#: 2/10 Date:   9/1/2022              TX#: 3/10 Date:  9/9/2022               TX#: 4/10 Date:   9/19/2022              TX#: 5/10 Date: 9/19/2022  Tx#: 6/10 9/26/2022  7/10   Nustep: st 11:   5 mins Nustep 6 mins  Nustep 6 mins , L4 ,seat 10  Nustep  7 mins warm up. 8 mins warm up Nustep 6 mins    Sit to stand:  x5 w/ UE support ( limited by R hand pain) Stance with small perturbations ( difficult) Side step R/L  In PB x6  Sit to stand x5    Walk 1/10 of mile, no device, no rests, no LOB   Graded imagery: using mirror for L wrist and finger ROM to assist R   - AROM L tendon glides, opposition, rolling ball as lotus by pain  Vestibular board: a/p and balance with UE support and SBA  - ant step up R/L x8 onto 4\" step with UE support  Vestibular board: a/p and balance with UE support and SBA  - ant step up R/L x8 onto 6\" step with UE suppor Vestibular board: a/p and balance with UE support and SBA  - ant step up on 6\" x10  - alt heel taps R/L x10  Vestibular board: a/p and balance with UE support and SBA  - ant step up on 6\" x10  - alt heel taps R/L x10   W/ UE support Vestibular board: a/p and balance with UE support and SBA  - ant step up on 6\" x10  - alt heel taps R/L x10  RS in stance: challenges ant/post, med/lat      Manual: Gr II  GH mobs, inferior  to increase abd  - GR II joint traction to 2-4th DIP and PIP joints   - metacarpal gr II mobs  2-4th DIP and PIP joints   - metacarpal gr II mobs     Instructed on diaphragmatic breathing  Cont posture correction, shoulder circles back  Sit to stand from elevated table.  x5  R wrist dorsal and volar mobs GR II, gentle stretch into R wrist flexion,   Metacarpal mobs  R wrist dorsal and volar mobs GR II, gentle stretch into R wrist flexion,   Metacarpal mobs  R wrist dorsal and volar mobs GR II, gentle stretch into R wrist flexion,     R AAROM into R shoulder flex/abd Hip add x12   - sit to stand x10 w/ UE support    Balance: heels together with C rot R/L   -tandem stance with C rot R/L x8 each  - alt march with UE support x8 R/L  Balance: heels together with C rot R/L  R GH joint mobs GR II-III, gentle ROM in flexion   R elbow flex/ext x20     Ball pass behind back x10 R/L  - Weight bearing R hand , circles with  SLS R, then L, with step forward, step back, UE support, cues for increased hip extension Seated T ext with lumbar roll x10     HP to bilateral hands 10 mins  Graded imagery: using mirror for L wrist and finger ROM to assist R   - AROM L tendon glides, opposition, rolling ball as lotus by pain  Graded imagery: using mirror for L wrist and finger ROM to assist R   - AROM L tendon glides, opposition, rolling ball as lotus by pain  Balance: heels together with C rot R/L   - airex for stance and then C rot R/L  Cont with diaphragmatic breathing. SLS R, then L, with step forward, step back, UE support, cues for increased hip extension    side step R/L 10' x4    AAROM R shoulder in flexion /abd  - R shoulder ER x12 Seated knee ext x10 R/L , seated march x10 R/L  Seated knee ext x10 R/L , seated march x10 R/L   Seated ankle pumps x10    HEP: tendon glides, sit to stand , resume aquatic exercises , practice large  squeeze.    8/30/2022  Standing heel raises with UE support 2x10,  Stand with heels together C rot R/L     Charges: , ex, neuroreed , gt     Total Timed Treatment: 45 +10  Min HP  Total Treatment Time: 45* min

## 2022-09-30 ENCOUNTER — OFFICE VISIT (OUTPATIENT)
Dept: PHYSICAL THERAPY | Age: 82
End: 2022-09-30
Attending: INTERNAL MEDICINE
Payer: MEDICARE

## 2022-09-30 DIAGNOSIS — M45.0 ANKYLOSING SPONDYLITIS OF MULTIPLE SITES IN SPINE (HCC): ICD-10-CM

## 2022-09-30 PROCEDURE — 97116 GAIT TRAINING THERAPY: CPT

## 2022-09-30 PROCEDURE — 97112 NEUROMUSCULAR REEDUCATION: CPT

## 2022-09-30 NOTE — PROGRESS NOTES
Diagnosis:   Anklyosing Spondylitis,   Balance disorder    Referring Provider: Margret Cuba  Date of Evaluation:    8/26    Precautions:  Increased fall risk, visual impairment, h/o compression fx Next MD visit:   none scheduled  Date of Surgery: n/a   Insurance Primary/Secondary: [employer] P[employer] / N/A     # Auth Visits: 10          Subjective:    . Needs to start getting infusion for arthritis. Starting Oct 7. .  .  Feels unsteady in standing, and on curbs  and is afraid of falling. Wants to focus on balance work. Pain:      0 /10   R arm  ( goes up to 6 or 7)       Objective:  Arrived without cane  SLS  R/L  2-3 secs               Tandem stance = 0   R elbow flex to 135 deg  R shoulder AROM flexion to 140   + neural tension R median and ulnar nerve. Assessment: Unable to tolerate lying supine on shuttle due to back pain. R middle finger still experiencing arthritic flare. R UE overall is very tender to palpation throughout forearm and upper arm. Patient was challenged by standing static and dynamic balance exercises, especially step fwd/step back exercise. Problem list: decreased balance, acute R hand and forearm pain , R shoulder weakness, + R median neural tension,  decreased LE strength, decreased standing balance  . .     . LE weakness limits sit to stand transfers and transfers in and out of car. Kyphotic posture affects COG and static an dynamic standing balance putting patient at increased risk to fall. She is using her cane for community amb.        Goals: (to be met in 10 visits)   * Patient is able to transfer sit to stand with UE support with min difficulty   * Improve modified  tandem stance to 20 sec to improve balance to  allow patient to complete changes in direction in gait safely  * Improve R hand ROM/decrease pain to allow patient to be able to improve  to be able to take glasses on and off  * Improve R shoulder abd and ER 1 grade to allow patient to touch the back of her head. Plan: Cont 10/6   balance and gait activities, R UE strengthening. Date:   9/1/2022              TX#: 3/10 Date:  9/9/2022               TX#: 4/10 Date:   9/19/2022              TX#: 5/10 Date: 9/19/2022  Tx#: 6/10 9/26/2022  7/10 9/30/2022  8/10   Nustep 6 mins  Nustep 6 mins , L4 ,seat 10  Nustep  7 mins warm up. 8 mins warm up Nustep 6 mins  Nustep : 8 mins   Stance with small perturbations ( difficult) Side step R/L  In PB x6  Sit to stand x5    Walk 1/10 of mile, no device, no rests, no LOB Sit to stand : x8 needs UE support   Vestibular board: a/p and balance with UE support and SBA  - ant step up R/L x8 onto 4\" step with UE support  Vestibular board: a/p and balance with UE support and SBA  - ant step up R/L x8 onto 6\" step with UE suppor Vestibular board: a/p and balance with UE support and SBA  - ant step up on 6\" x10  - alt heel taps R/L x10  Vestibular board: a/p and balance with UE support and SBA  - ant step up on 6\" x10  - alt heel taps R/L x10   W/ UE support Vestibular board: a/p and balance with UE support and SBA  - ant step up on 6\" x10  - alt heel taps R/L x10  RS in stance: challenges ant/post, med/lat Vestibular board: a/p and balance with UE support and SBA  - ant step up on 6\" x10  - alt heel taps R/L x10  Hip add: x15    2-4th DIP and PIP joints   - metacarpal gr II mobs     Instructed on diaphragmatic breathing  Cont posture correction, shoulder circles back  Sit to stand from elevated table.  x5  R wrist dorsal and volar mobs GR II, gentle stretch into R wrist flexion,   Metacarpal mobs  R wrist dorsal and volar mobs GR II, gentle stretch into R wrist flexion,   Metacarpal mobs  R wrist dorsal and volar mobs GR II, gentle stretch into R wrist flexion,     R AAROM into R shoulder flex/abd Hip add x12   - sit to stand x10 w/ UE support  RS in stance: challenges ant/post, med/lat   Balance: heels together with C rot R/L  R GH joint mobs GR II-III, gentle ROM in flexion   R elbow flex/ext x20     Ball pass behind back x10 R/L  - Weight bearing R hand , circles with  SLS R, then L, with step forward, step back, UE support, cues for increased hip extension Seated T ext with lumbar roll x10  Standing march R/L x15    Graded imagery: using mirror for L wrist and finger ROM to assist R   - AROM L tendon glides, opposition, rolling ball as lotus by pain  Graded imagery: using mirror for L wrist and finger ROM to assist R   - AROM L tendon glides, opposition, rolling ball as lotus by pain  Balance: heels together with C rot R/L   - airex for stance and then C rot R/L  Cont with diaphragmatic breathing. SLS R, then L, with step forward, step back, UE support, cues for increased hip extension SLS multiple attempts   - progressed to modified  tandem stance       AAROM R shoulder in flexion /abd  - R shoulder ER x12 Seated knee ext x10 R/L , seated march x10 R/L  Seated knee ext x10 R/L , seated march x10 R/L   Seated ankle pumps x10  Seated knee ext x15 R/L , seated march x15 R/L : w/ 2# wts   Seated ankle pumps x10    HEP: tendon glides, sit to stand , resume aquatic exercises , practice large  squeeze.    8/30/2022  Standing heel raises with UE support 2x10,  Stand with heels together C rot R/L     Charges: , ex, neuroreed  2   Total Timed Treatment: 45 Total Treatment Time: 45* min

## 2022-10-03 ENCOUNTER — APPOINTMENT (OUTPATIENT)
Dept: PHYSICAL THERAPY | Age: 82
End: 2022-10-03
Attending: INTERNAL MEDICINE
Payer: MEDICARE

## 2022-10-06 ENCOUNTER — OFFICE VISIT (OUTPATIENT)
Dept: PHYSICAL THERAPY | Age: 82
End: 2022-10-06
Attending: INTERNAL MEDICINE
Payer: MEDICARE

## 2022-10-06 DIAGNOSIS — M45.0 ANKYLOSING SPONDYLITIS OF MULTIPLE SITES IN SPINE (HCC): ICD-10-CM

## 2022-10-06 PROCEDURE — 97110 THERAPEUTIC EXERCISES: CPT

## 2022-10-06 PROCEDURE — 97112 NEUROMUSCULAR REEDUCATION: CPT

## 2022-10-06 NOTE — PROGRESS NOTES
Diagnosis:   Anklyosing Spondylitis,   Balance disorder    Referring Provider: Linda Givens  Date of Evaluation:    8/26    Precautions:  Increased fall risk, visual impairment, h/o compression fx Next MD visit:   none scheduled  Date of Surgery: n/a   Insurance Primary/Secondary: Cara Floyd / N/A     # Auth Visits: 10          Subjective:    . Needs to start getting infusion for arthritis starting Oct 7.  R hand hurts every day. .  Feels unsteady in standing, and on curbs  and is afraid of falling. Wants to focus on balance work. Pain:      0 /10   R arm  ( goes up to 6 or 7)       Objective:  Arrived without cane  SLS  R/L  2-3 secs               Tandem stance = 0      Modified tandem stance. R elbow flex to 135 deg  R shoulder AROM flexion to 140   + neural tension R median and ulnar nerve. Assessment: Needing UE support for most balance exercise. Feels L ankle is unstable and is part of what limits her balance. .  R UE overall is very tender to palpation throughout forearm and upper arm. Patient was challenged by standing static and dynamic balance exercises, especially step fwd/step back exercise. Problem list: decreased balance, acute R hand and forearm pain , R shoulder weakness, + R median neural tension,  decreased LE strength, decreased standing balance  . .     . LE weakness limits sit to stand transfers and transfers in and out of car. Kyphotic posture affects COG and static an dynamic standing balance putting patient at increased risk to fall. She is using her cane for community amb some of the time.        Goals: (to be met in 10 visits)   * Patient is able to transfer sit to stand with UE support with min difficulty   * Improve modified  tandem stance to 20 sec to improve balance to  allow patient to complete changes in direction in gait safely  * Improve R hand ROM/decrease pain to allow patient to be able to improve  to be able to take glasses on and off  * Improve R shoulder abd and ER 1 grade to allow patient to touch the back of her head. Plan: Cont M 2:45 and  M 10/17 : 1045    balance and gait activities, R UE strengthening.   Date:   9/19/2022              TX#: 5/10 Date: 9/19/2022  Tx#: 6/10 9/26/2022  7/10 9/30/2022  8/10 10/6/2022  9/10   Nustep  7 mins warm up. 8 mins warm up Nustep 6 mins  Nustep : 8 mins Nustep 5 mins w/u   Sit to stand x5    Walk 1/10 of mile, no device, no rests, no LOB Sit to stand : x8 needs UE support SLS R, then L,5 each ,  with step forward, step back, UE support, cues for increased hip extension   Vestibular board: a/p and balance with UE support and SBA  - ant step up on 6\" x10  - alt heel taps R/L x10  Vestibular board: a/p and balance with UE support and SBA  - ant step up on 6\" x10  - alt heel taps R/L x10   W/ UE support Vestibular board: a/p and balance with UE support and SBA  - ant step up on 6\" x10  - alt heel taps R/L x10  RS in stance: challenges ant/post, med/lat Vestibular board: a/p and balance with UE support and SBA  - ant step up on 6\" x10  - alt heel taps R/L x10  Hip add: x15  Alt taps  R/L 6\" x15   SLS R/L= 1 sec  - SLS with toe down R/L  5 attempts, then progression to C rot R/L        Vestibular board: a/p and balance with UE support and SBA   R wrist dorsal and volar mobs GR II, gentle stretch into R wrist flexion,   Metacarpal mobs  R wrist dorsal and volar mobs GR II, gentle stretch into R wrist flexion,     R AAROM into R shoulder flex/abd Hip add x12   - sit to stand x10 w/ UE support  RS in stance: challenges ant/post, med/lat Rock forward and back x10   - heel raise x10         Ball pass behind back x10 R/L  - Weight bearing R hand , circles with  SLS R, then L, with step forward, step back, UE support, cues for increased hip extension Seated T ext with lumbar roll x10  Standing march R/L x15  Side step R/L 10' x4     Sit to stand from elevated table x8 : cues for safey   Balance: heels together with C rot R/L   - airex for stance and then C rot R/L  Cont with diaphragmatic breathing. SLS R, then L, with step forward, step back, UE support, cues for increased hip extension SLS multiple attempts   - progressed to modified  tandem stance   SLS =0 R/L  Modified tandem: 20 secs   - tandem with C rot R/L  X5 R/L    AAROM R shoulder in flexion /abd  - R shoulder ER x12 Seated knee ext x10 R/L , seated march x10 R/L  Seated knee ext x10 R/L , seated march x10 R/L   Seated ankle pumps x10  Seated knee ext x15 R/L , seated march x15 R/L : w/ 2# wts   Seated ankle pumps x10  Seated knee ext x15 R/L , seated march x15   HEP: tendon glides, sit to stand , resume aquatic exercises , practice large  squeeze.    8/30/2022  Standing heel raises with UE support 2x10,  Stand with heels together C rot R/L     Charges: , ex, neuroreed  2   Total Timed Treatment: 45 Total Treatment Time: 45* min

## 2022-10-10 ENCOUNTER — OFFICE VISIT (OUTPATIENT)
Dept: PHYSICAL THERAPY | Age: 82
End: 2022-10-10
Attending: INTERNAL MEDICINE
Payer: MEDICARE

## 2022-10-10 DIAGNOSIS — M45.0 ANKYLOSING SPONDYLITIS OF MULTIPLE SITES IN SPINE (HCC): ICD-10-CM

## 2022-10-10 PROCEDURE — 97112 NEUROMUSCULAR REEDUCATION: CPT

## 2022-10-10 PROCEDURE — 97110 THERAPEUTIC EXERCISES: CPT

## 2022-10-10 NOTE — PROGRESS NOTES
Diagnosis:   Anklyosing Spondylitis,   Balance disorder    Referring Provider: Deedee Rey  Date of Evaluation:    8/26    Precautions:  Increased fall risk, visual impairment, h/o compression fx Next MD visit:   none scheduled  Date of Surgery: n/a   Insurance Primary/Secondary: [address] [place],9D / N/A     # Auth Visits: 10        Progress Summary and Update to POC to 11 visits   Subjective: Domingo Silverio R hand hurts again today after feeling better the day after the infusion. .   .  Feels unsteady in standing, and on curbs  and is afraid of falling. Wants to focus on balance work. Pain:      0 /10   R arm  ( goes up to 6 or 7)       Objective:  Arrived without cane  SLS  R/L  2-3 secs               Tandem stance = 0      Modified tandem stance. R elbow flex to 135 deg  R shoulder AROM flexion to 140   + neural tension R median and ulnar nerve. 30 sec sit to stand: unable to achieve without UE support      Assessment: Recommend continuing 1 more visit to finalize HEP    Needing UE support for most balance exercise. Feels L ankle is unstable and is part of what limits her balance. . Patient was challenged by standing static and dynamic balance exercises, especially step fwd/step back exercise. Problem list: decreased balance, acute R hand and forearm pain , R shoulder weakness, + R median neural tension,  decreased LE strength, decreased standing balance  . .     . LE weakness limits sit to stand transfers and transfers in and out of car. Kyphotic posture affects COG and static an dynamic standing balance putting patient at increased risk to fall. She is using her cane for community amb some of the time.        Goals: (to be met in 11 visits)   * Patient is able to transfer sit to stand with UE support with min difficulty   * Improve modified  tandem stance to 20 sec to improve balance to  allow patient to complete changes in direction in gait safely  * Improve R hand ROM/decrease pain to allow patient to be able to improve  to be able to take glasses on and off  * Improve R shoulder abd and ER 1 grade to allow patient to touch the back of her head. Plan:  M 10/17 : 1045    balance and gait activities. Update HEP .  Cont x1 before discharge  Date:   9/19/2022              TX#: 5/10 Date: 9/19/2022  Tx#: 6/10 9/26/2022  7/10 9/30/2022  8/10 10/6/2022  9/10 10/10/2022  10/11 [facility]   Nustep  7 mins warm up. 8 mins warm up Nustep 6 mins  Nustep : 8 mins Nustep 5 mins w/u Nustep 5 mins w/u   Sit to stand x5    Walk 1/10 of mile, no device, no rests, no LOB Sit to stand : x8 needs UE support SLS R, then L,5 each ,  with step forward, step back, UE support, cues for increased hip extension Hip add x20  - hip add with knee ext x10 R./L   Vestibular board: a/p and balance with UE support and SBA  - ant step up on 6\" x10  - alt heel taps R/L x10  Vestibular board: a/p and balance with UE support and SBA  - ant step up on 6\" x10  - alt heel taps R/L x10   W/ UE support Vestibular board: a/p and balance with UE support and SBA  - ant step up on 6\" x10  - alt heel taps R/L x10  RS in stance: challenges ant/post, med/lat Vestibular board: a/p and balance with UE support and SBA  - ant step up on 6\" x10  - alt heel taps R/L x10  Hip add: x15  Alt taps  R/L 6\" x15   SLS R/L= 1 sec  - SLS with toe down R/L  5 attempts, then progression to C rot R/L        Vestibular board: a/p and balance with UE support and SBA Alt taps  R/L 6\" x15   Sit to stand from elevated table x10: cues to utilize quads versus trunk flexion     ASU 6\" x10 R/L    R wrist dorsal and volar mobs GR II, gentle stretch into R wrist flexion,   Metacarpal mobs  R wrist dorsal and volar mobs GR II, gentle stretch into R wrist flexion,     R AAROM into R shoulder flex/abd Hip add x12   - sit to stand x10 w/ UE support  RS in stance: challenges ant/post, med/lat   Rock forward and back x10   - heel raise x10  Rock forward and back x10   - heel raise [employer] pass behind back x10 R/L  - Weight bearing R hand , circles with  SLS R, then L, with step forward, step back, UE support, cues for increased hip extension Seated T ext with lumbar roll x10  Standing march R/L x15  Side step R/L 10' x4     Sit to stand from elevated table x8 : cues for safey Side step R/L 10' x4   Balance: heels together with C rot R/L   - airex for stance and then C rot R/L  Cont with diaphragmatic breathing. SLS R, then L, with step forward, step back, UE support, cues for increased hip extension SLS multiple attempts   - progressed to modified  tandem stance   SLS =0 R/L  Modified tandem: 20 secs   - tandem with C rot R/L  X5 R/L   tandem with C rot R/L  X5 R/L   - stance on Air ex 30 sec x3   - standing march R/L x10 each   AAROM R shoulder in flexion /abd  - R shoulder ER x12 Seated knee ext x10 R/L , seated march x10 R/L  Seated knee ext x10 R/L , seated march x10 R/L   Seated ankle pumps x10  Seated knee ext x15 R/L , seated march x15 R/L : w/ 2# wts   Seated ankle pumps x10  Seated knee ext x15 R/L , seated march x15 Vestibular board: a/p and balance with UE support and SBA   HEP: tendon glides, sit to stand , resume aquatic exercises , practice large  squeeze.    8/30/2022  Standing heel raises with UE support 2x10,  Stand with heels together C rot R/L   10/10/2022:   Standing march R/L x10     Charges: , ex, neuroreed  2   Total Timed Treatment: 45 Total Treatment Time: 45* min

## 2022-10-17 ENCOUNTER — OFFICE VISIT (OUTPATIENT)
Dept: PHYSICAL THERAPY | Age: 82
End: 2022-10-17
Attending: INTERNAL MEDICINE
Payer: MEDICARE

## 2022-10-17 PROCEDURE — 97112 NEUROMUSCULAR REEDUCATION: CPT

## 2022-10-17 PROCEDURE — 97110 THERAPEUTIC EXERCISES: CPT

## 2022-10-17 NOTE — PROGRESS NOTES
Diagnosis:   Anklyosing Spondylitis,   Balance disorder    Referring Provider: No ref. provider found  Date of Evaluation:    8/26    Precautions:  Increased fall risk, visual impairment, h/o compression fx Next MD visit:   none scheduled  Date of Surgery: n/a   Insurance Primary/Secondary: [address] [place],9D / N/A     # Auth Visits: 10        Discharge Summary   Subjective: Sidney GALLEGOS hand hurts again today after feeling better for several days after the infusion. .   .  Feels unsteady in standing, and on curbs  and is afraid of falling. Wants to focus on balance work. Pain:      0 /10   R arm  ( goes up to 6 or 7)       Objective:  Arrived without cane. Discussed use of [place] [employer] and continuing water workouts. SLS  R/L  2-3 secs               Tandem stance = 0      Modified tandem stance= 10 sec   R elbow flex to 135 deg  R shoulder AROM flexion to 140   + neural tension R median and ulnar nerve. 30 sec sit to stand: unable to achieve without UE support      Assessment: Unable to use R UE functionally due to arthritic pain and wasting of intrinsic muscles. Needing UE support for most balance exercise. Feels L ankle is unstable and is part of what limits her balance. . Patient was challenged by standing static and dynamic balance exercises, especially step fwd/step back exercise. .   LE weakness limits sit to stand transfers and transfers in and out of car. Kyphotic posture affects COG and static an dynamic standing balance putting patient at increased risk to fall. She is using her cane for community amb some of the time. Patient is IND with her HEP and appears to have reached maximum benefit from PT at this time.        Goals: (to be met in 11 visits)   * Patient is able to transfer sit to stand with UE support with min difficulty : improved  * Improve modified  tandem stance to 20 sec to improve balance to  allow patient to complete changes in direction in gait safely Not MET  * Improve R hand ROM/decrease pain to allow patient to be able to improve  to be able to take glasses on and off Improved   * Improve R shoulder abd and ER 1 grade to allow patient to touch the back of her head.   Not met      Plan: Discharge PT  Date: 9/19/2022  Tx#: 6/10 9/26/2022  7/10 9/30/2022  8/10 10/6/2022  9/10 10/10/2022  10/11 [facility] 10/17/2022  11/11   8 mins warm up Nustep 6 mins  Nustep : 8 mins Nustep 5 mins w/u Nustep 5 mins w/u 5 mins w/u     Walk 1/10 of mile, no device, no rests, no LOB Sit to stand : x8 needs UE support SLS R, then L,5 each ,  with step forward, step back, UE support, cues for increased hip extension Hip add x20  - hip add with knee ext x10 R./L Hip add x30   Vestibular board: a/p and balance with UE support and SBA  - ant step up on 6\" x10  - alt heel taps R/L x10   W/ UE support Vestibular board: a/p and balance with UE support and SBA  - ant step up on 6\" x10  - alt heel taps R/L x10  RS in stance: challenges ant/post, med/lat Vestibular board: a/p and balance with UE support and SBA  - ant step up on 6\" x10  - alt heel taps R/L x10  Hip add: x15  Alt taps  R/L 6\" x15   SLS R/L= 1 sec  - SLS with toe down R/L  5 attempts, then progression to C rot R/L        Vestibular board: a/p and balance with UE support and SBA Alt taps  R/L 6\" x15   Sit to stand from elevated table x10: cues to utilize quads versus trunk flexion     ASU 6\" x10 R/L  Sit to stand from elevated table x10: has difficulty controlling the transfers    - Modified tandem stance with C flex/ex    -    R wrist dorsal and volar mobs GR II, gentle stretch into R wrist flexion,     R AAROM into R shoulder flex/abd Hip add x12   - sit to stand x10 w/ UE support  RS in stance: challenges ant/post, med/lat   Rock forward and back x10   - heel raise x10  Rock forward and back x10   - heel raise x15  HP to R hand 10 mins   SLS R, then L, with step forward, step back, UE support, cues for increased hip extension Seated T ext with lumbar roll x10  Standing march R/L x15  Side step R/L 10' x4     Sit to stand from elevated table x8 : cues for safey Side step R/L 10' x4 Vestibular board: a/p and balance with UE support and SBA   Cont with diaphragmatic breathing. SLS R, then L, with step forward, step back, UE support, cues for increased hip extension SLS multiple attempts   - progressed to modified  tandem stance   SLS =0 R/L  Modified tandem: 20 secs   - tandem with C rot R/L  X5 R/L   tandem with C rot R/L  X5 R/L   - stance on Air ex 30 sec x3   - standing march R/L x10 each  tandem with C rot R/L  X5 R/L   - standing march R/L x10 each   Seated knee ext x10 R/L , seated march x10 R/L  Seated knee ext x10 R/L , seated march x10 R/L   Seated ankle pumps x10  Seated knee ext x15 R/L , seated march x15 R/L : w/ 2# wts   Seated ankle pumps x10  Seated knee ext x15 R/L , seated march x15 Vestibular board: a/p and balance with UE support and SBA Seated knee ext x15 R/L , seated march x15 R/L : w/ 2# wts    HEP: tendon glides, sit to stand , resume aquatic exercises , practice large  squeeze.    8/30/2022  Standing heel raises with UE support 2x10,  Stand with heels together C rot R/L   10/10/2022:   Standing march R/L x10     Charges: , ex, neuroreed  2   Total Timed Treatment: 40 neuroreed 2, ex  Total Treatment Time: 40 min+ 10 mins HP

## 2023-08-22 ENCOUNTER — APPOINTMENT (OUTPATIENT)
Dept: CT IMAGING | Facility: HOSPITAL | Age: 83
End: 2023-08-22
Attending: EMERGENCY MEDICINE
Payer: MEDICARE

## 2023-08-22 ENCOUNTER — APPOINTMENT (OUTPATIENT)
Dept: GENERAL RADIOLOGY | Facility: HOSPITAL | Age: 83
End: 2023-08-22
Attending: EMERGENCY MEDICINE
Payer: MEDICARE

## 2023-08-22 ENCOUNTER — HOSPITAL ENCOUNTER (OUTPATIENT)
Facility: HOSPITAL | Age: 83
Setting detail: OBSERVATION
Discharge: HOME | End: 2023-08-23
Attending: EMERGENCY MEDICINE | Admitting: HOSPITALIST
Payer: MEDICARE

## 2023-08-22 DIAGNOSIS — R55 SYNCOPE, NEAR: Primary | ICD-10-CM

## 2023-08-22 DIAGNOSIS — R07.9 CHEST PAIN OF UNCERTAIN ETIOLOGY: ICD-10-CM

## 2023-08-22 DIAGNOSIS — R10.9 ABDOMINAL PAIN OF UNKNOWN ETIOLOGY: ICD-10-CM

## 2023-08-22 PROBLEM — R79.89 AZOTEMIA: Status: ACTIVE | Noted: 2023-08-22

## 2023-08-22 PROBLEM — D69.6 THROMBOCYTOPENIA (HCC): Status: ACTIVE | Noted: 2023-08-22

## 2023-08-22 PROBLEM — D69.6 THROMBOCYTOPENIA: Status: ACTIVE | Noted: 2023-08-22

## 2023-08-22 PROBLEM — E87.1 HYPONATREMIA: Status: ACTIVE | Noted: 2023-08-22

## 2023-08-22 LAB
ALBUMIN SERPL-MCNC: 3.4 G/DL (ref 3.4–5)
ALBUMIN/GLOB SERPL: 0.9 {RATIO} (ref 1–2)
ALP LIVER SERPL-CCNC: 53 U/L
ALT SERPL-CCNC: 23 U/L
ANION GAP SERPL CALC-SCNC: 4 MMOL/L (ref 0–18)
AST SERPL-CCNC: 29 U/L (ref 15–37)
ATRIAL RATE: 70 BPM
BASOPHILS # BLD AUTO: 0.01 X10(3) UL (ref 0–0.2)
BASOPHILS NFR BLD AUTO: 0.1 %
BILIRUB SERPL-MCNC: 0.5 MG/DL (ref 0.1–2)
BILIRUB UR QL STRIP.AUTO: NEGATIVE
BUN BLD-MCNC: 15 MG/DL (ref 7–18)
CALCIUM BLD-MCNC: 8.9 MG/DL (ref 8.5–10.1)
CHLORIDE SERPL-SCNC: 105 MMOL/L (ref 98–112)
CLARITY UR REFRACT.AUTO: CLEAR
CO2 SERPL-SCNC: 22 MMOL/L (ref 21–32)
COHGB MFR BLD: 4.1 % SAT (ref 0–3)
CREAT BLD-MCNC: 0.58 MG/DL
EGFRCR SERPLBLD CKD-EPI 2021: 89 ML/MIN/1.73M2 (ref 60–?)
EOSINOPHIL # BLD AUTO: 0.04 X10(3) UL (ref 0–0.7)
EOSINOPHIL NFR BLD AUTO: 0.6 %
ERYTHROCYTE [DISTWIDTH] IN BLOOD BY AUTOMATED COUNT: 12.3 %
GLOBULIN PLAS-MCNC: 3.9 G/DL (ref 2.8–4.4)
GLUCOSE BLD-MCNC: 135 MG/DL (ref 70–99)
GLUCOSE UR STRIP.AUTO-MCNC: NORMAL MG/DL
HCT VFR BLD AUTO: 35.1 %
HGB BLD-MCNC: 12 G/DL
HGB BLD-MCNC: 12.6 G/DL
IMM GRANULOCYTES # BLD AUTO: 0.02 X10(3) UL (ref 0–1)
IMM GRANULOCYTES NFR BLD: 0.3 %
KETONES UR STRIP.AUTO-MCNC: NEGATIVE MG/DL
LEUKOCYTE ESTERASE UR QL STRIP.AUTO: NEGATIVE
LYMPHOCYTES # BLD AUTO: 1.4 X10(3) UL (ref 1–4)
LYMPHOCYTES NFR BLD AUTO: 19.8 %
MCH RBC QN AUTO: 33.9 PG (ref 26–34)
MCHC RBC AUTO-ENTMCNC: 34.2 G/DL (ref 31–37)
MCV RBC AUTO: 99.2 FL
METHGB MFR BLD: 0.6 % SAT (ref 0.4–1.5)
MONOCYTES # BLD AUTO: 0.48 X10(3) UL (ref 0.1–1)
MONOCYTES NFR BLD AUTO: 6.8 %
NEUTROPHILS # BLD AUTO: 5.12 X10 (3) UL (ref 1.5–7.7)
NEUTROPHILS # BLD AUTO: 5.12 X10(3) UL (ref 1.5–7.7)
NEUTROPHILS NFR BLD AUTO: 72.4 %
NITRITE UR QL STRIP.AUTO: NEGATIVE
OSMOLALITY SERPL CALC.SUM OF ELEC: 275 MOSM/KG (ref 275–295)
OXYHGB MFR BLDV: 94.9 % (ref 72–78)
P AXIS: 45 DEGREES
P-R INTERVAL: 160 MS
PH UR STRIP.AUTO: 7 [PH] (ref 5–8)
PLATELET # BLD AUTO: 59 10(3)UL (ref 150–450)
POTASSIUM SERPL-SCNC: 4.6 MMOL/L (ref 3.5–5.1)
PROT SERPL-MCNC: 7.3 G/DL (ref 6.4–8.2)
PROT UR STRIP.AUTO-MCNC: NEGATIVE MG/DL
Q-T INTERVAL: 382 MS
QRS DURATION: 82 MS
QTC CALCULATION (BEZET): 412 MS
R AXIS: -4 DEGREES
RBC # BLD AUTO: 3.54 X10(6)UL
RBC UR QL AUTO: NEGATIVE
SARS-COV-2 RNA RESP QL NAA+PROBE: NOT DETECTED
SODIUM SERPL-SCNC: 131 MMOL/L (ref 136–145)
SP GR UR STRIP.AUTO: 1.01 (ref 1–1.03)
T AXIS: 21 DEGREES
TROPONIN I HIGH SENSITIVITY: 5 NG/L
TSI SER-ACNC: 2.69 MIU/ML (ref 0.36–3.74)
UROBILINOGEN UR STRIP.AUTO-MCNC: NORMAL MG/DL
VENTRICULAR RATE: 70 BPM
WBC # BLD AUTO: 7.1 X10(3) UL (ref 4–11)

## 2023-08-22 PROCEDURE — 74177 CT ABD & PELVIS W/CONTRAST: CPT | Performed by: EMERGENCY MEDICINE

## 2023-08-22 PROCEDURE — 82375 ASSAY CARBOXYHB QUANT: CPT | Performed by: EMERGENCY MEDICINE

## 2023-08-22 PROCEDURE — 71045 X-RAY EXAM CHEST 1 VIEW: CPT | Performed by: EMERGENCY MEDICINE

## 2023-08-22 PROCEDURE — 93005 ELECTROCARDIOGRAM TRACING: CPT

## 2023-08-22 PROCEDURE — 71275 CT ANGIOGRAPHY CHEST: CPT | Performed by: EMERGENCY MEDICINE

## 2023-08-22 PROCEDURE — 80053 COMPREHEN METABOLIC PANEL: CPT | Performed by: EMERGENCY MEDICINE

## 2023-08-22 PROCEDURE — 84443 ASSAY THYROID STIM HORMONE: CPT | Performed by: EMERGENCY MEDICINE

## 2023-08-22 PROCEDURE — 85025 COMPLETE CBC W/AUTO DIFF WBC: CPT | Performed by: EMERGENCY MEDICINE

## 2023-08-22 PROCEDURE — 85018 HEMOGLOBIN: CPT | Performed by: EMERGENCY MEDICINE

## 2023-08-22 PROCEDURE — 84484 ASSAY OF TROPONIN QUANT: CPT | Performed by: EMERGENCY MEDICINE

## 2023-08-22 PROCEDURE — 83050 HGB METHEMOGLOBIN QUAN: CPT | Performed by: EMERGENCY MEDICINE

## 2023-08-22 PROCEDURE — 81003 URINALYSIS AUTO W/O SCOPE: CPT | Performed by: EMERGENCY MEDICINE

## 2023-08-22 PROCEDURE — 70450 CT HEAD/BRAIN W/O DYE: CPT | Performed by: EMERGENCY MEDICINE

## 2023-08-22 RX ORDER — BRIMONIDINE TARTRATE 2 MG/ML
1 SOLUTION/ DROPS OPHTHALMIC 2 TIMES DAILY
Status: DISCONTINUED | OUTPATIENT
Start: 2023-08-22 | End: 2023-08-23

## 2023-08-22 RX ORDER — METOPROLOL SUCCINATE 25 MG/1
25 TABLET, EXTENDED RELEASE ORAL 2 TIMES DAILY
Status: DISCONTINUED | OUTPATIENT
Start: 2023-08-22 | End: 2023-08-23

## 2023-08-22 RX ORDER — ACETAMINOPHEN/DIPHENHYDRAMINE 500MG-25MG
1 TABLET ORAL NIGHTLY PRN
COMMUNITY

## 2023-08-22 RX ORDER — DIPHENHYDRAMINE HCL 25 MG
50 CAPSULE ORAL NIGHTLY PRN
Status: DISCONTINUED | OUTPATIENT
Start: 2023-08-22 | End: 2023-08-23

## 2023-08-22 RX ORDER — ATORVASTATIN CALCIUM 10 MG/1
10 TABLET, FILM COATED ORAL NIGHTLY
Status: DISCONTINUED | OUTPATIENT
Start: 2023-08-22 | End: 2023-08-23

## 2023-08-22 RX ORDER — SULFASALAZINE 500 MG/1
1000 TABLET ORAL 2 TIMES DAILY
Status: DISCONTINUED | OUTPATIENT
Start: 2023-08-22 | End: 2023-08-23

## 2023-08-22 RX ORDER — ASPIRIN 81 MG/1
324 TABLET, CHEWABLE ORAL ONCE
Status: DISCONTINUED | OUTPATIENT
Start: 2023-08-22 | End: 2023-08-22

## 2023-08-22 RX ORDER — FOLIC ACID 1 MG/1
1 TABLET ORAL DAILY
Status: DISCONTINUED | OUTPATIENT
Start: 2023-08-23 | End: 2023-08-23

## 2023-08-22 RX ORDER — TIMOLOL MALEATE 5 MG/ML
1 SOLUTION/ DROPS OPHTHALMIC 2 TIMES DAILY
Status: DISCONTINUED | OUTPATIENT
Start: 2023-08-22 | End: 2023-08-23

## 2023-08-22 RX ORDER — LOSARTAN POTASSIUM 100 MG/1
100 TABLET ORAL DAILY
Status: DISCONTINUED | OUTPATIENT
Start: 2023-08-23 | End: 2023-08-23

## 2023-08-22 NOTE — ED QUICK NOTES
Pt requested for MD to be aware of family hx: father  of aortic aneurysm. Pt also requested to eat and drink. MD [facility] notified of family history and approved pt to [facility] . Pt provided with crackers and water. Pt denies other needs at this time.

## 2023-08-22 NOTE — ED QUICK NOTES
PT updated on plan of care. PT reports that she feels that she has a fever. Temp recheck 98.5F oral. PT repositions and set up to dangle on the side of the bed. Pt requests to use the bathroom via a wheelchair.   Tech to bedside to assist.

## 2023-08-22 NOTE — ED QUICK NOTES
Orders for admission, patient is aware of plan and ready to go upstairs. Any questions, please call ED RN Keeley Aragon at extension [phone].      Patient Covid vaccination status: Fully vaccinated     COVID Test Ordered in ED: Rapid SARS-CoV-2 by PCR    COVID Suspicion at Admission: N/A    Running Infusions:  None    Mental Status/LOC at time of transport: AOx4    Other pertinent information:   CIWA score: N/A   NIH score:  N/A

## 2023-08-22 NOTE — ED QUICK NOTES
Pt and  updated regarding room assignment. Transport requested for pt. Dr Juli Chavis at bedside discussing plan of care with patient.

## 2023-08-22 NOTE — ED QUICK NOTES
PT has been given approx 5-6 warm blankets over the last 2 hours. Offered food to pt and spouse. Crackers given and food ordered for the patient. PT has been updated on plan of care.

## 2023-08-22 NOTE — ED QUICK NOTES
Orders for admission, patient is aware of plan and ready to go upstairs. Any questions, please call ED RN Ayaan Hassan at extension [phone].      Patient Covid vaccination status: Fully vaccinated     COVID Test Ordered in ED: Rapid SARS-CoV-2 by PCR    COVID Suspicion at Admission: N/A    Running Infusions:  None    Mental Status/LOC at time of transport: A/OX4    Other pertinent information:   CIWA score: N/A   NIH score:  N/A

## 2023-08-22 NOTE — ED QUICK NOTES
PT family member at nurses station stating that no one is helping the patient. PT and spouse updated on the plan of care.
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
## 2023-08-23 VITALS
HEART RATE: 119 BPM | BODY MASS INDEX: 26 KG/M2 | SYSTOLIC BLOOD PRESSURE: 140 MMHG | DIASTOLIC BLOOD PRESSURE: 71 MMHG | OXYGEN SATURATION: 94 % | WEIGHT: 167 LBS | RESPIRATION RATE: 18 BRPM | TEMPERATURE: 98 F

## 2023-08-23 NOTE — PLAN OF CARE
NURSING DISCHARGE NOTE    Discharged Home via Wheelchair. Accompanied by Support staff  Belongings Taken by patient/family.  at bedside for discharge. PIV and tele removed. All questions answered. Problem: Patient/Family Goals  Goal: Patient/Family Long Term Goal  Description: Patient's Long Term Goal: \"go home\"    Interventions:  - MD to see  - See additional Care Plan goals for specific interventions  Outcome: Adequate for Discharge  Goal: Patient/Family Short Term Goal  Description: Patient's Short Term Goal: \"go home\"    Interventions:   - MD to see  - See additional Care Plan goals for specific interventions  Outcome: Adequate for Discharge     Problem: CARDIOVASCULAR - ADULT  Goal: Maintains optimal cardiac output and hemodynamic stability  Description: INTERVENTIONS:  - Monitor vital signs, rhythm, and trends  - Monitor for bleeding, hypotension and signs of decreased cardiac output  - Evaluate effectiveness of vasoactive medications to optimize hemodynamic stability  - Monitor arterial and/or venous puncture sites for bleeding and/or hematoma  - Assess quality of pulses, skin color and temperature  - Assess for signs of decreased coronary artery perfusion - ex.  Angina  - Evaluate fluid balance, assess for edema, trend weights  Outcome: Adequate for Discharge  Goal: Absence of cardiac arrhythmias or at baseline  Description: INTERVENTIONS:  - Continuous cardiac monitoring, monitor vital signs, obtain 12 lead EKG if indicated  - Evaluate effectiveness of antiarrhythmic and heart rate control medications as ordered  - Initiate emergency measures for life threatening arrhythmias  - Monitor electrolytes and administer replacement therapy as ordered  Outcome: Adequate for Discharge     Problem: NEUROLOGICAL - ADULT  Goal: Achieves maximal functionality and self care  Description: INTERVENTIONS  - Monitor swallowing and airway patency with patient fatigue and changes in neurological status  - Encourage and assist patient to increase activity and self care with guidance from PT/OT  - Encourage visually impaired, hearing impaired and aphasic patients to use assistive/communication devices  Outcome: Adequate for Discharge

## 2023-08-23 NOTE — PLAN OF CARE
NURSING ADMISSION NOTE      Patient admitted via Cart  Oriented to room. Safety precautions initiated. Bed in low position. Call light in reach. Assumed care at [address] [place] [address]. Pt is A&Ox4. Pt is on RA, O2 sats WNL. NSR on tele, VSS. Continent of B&B. Denies pain at this time. Up w/ SBA, tolerating well. Plan of care reviewed with patient, verbalizes understanding, all needs addressed at this time, pt seems to be resting comfortably. Call light within reach. Problem: CARDIOVASCULAR - ADULT  Goal: Maintains optimal cardiac output and hemodynamic stability  Description: INTERVENTIONS:  - Monitor vital signs, rhythm, and trends  - Monitor for bleeding, hypotension and signs of decreased cardiac output  - Evaluate effectiveness of vasoactive medications to optimize hemodynamic stability  - Monitor arterial and/or venous puncture sites for bleeding and/or hematoma  - Assess quality of pulses, skin color and temperature  - Assess for signs of decreased coronary artery perfusion - ex.  Angina  - Evaluate fluid balance, assess for edema, trend weights  8/22/2023 2213 by Elroy Rogers RN  Outcome: Progressing  8/22/2023 2212 by Elroy Rogers RN  Outcome: Progressing  Goal: Absence of cardiac arrhythmias or at baseline  Description: INTERVENTIONS:  - Continuous cardiac monitoring, monitor vital signs, obtain 12 lead EKG if indicated  - Evaluate effectiveness of antiarrhythmic and heart rate control medications as ordered  - Initiate emergency measures for life threatening arrhythmias  - Monitor electrolytes and administer replacement therapy as ordered  Outcome: Progressing     Problem: NEUROLOGICAL - ADULT  Goal: Achieves maximal functionality and self care  Description: INTERVENTIONS  - Monitor swallowing and airway patency with patient fatigue and changes in neurological status  - Encourage and assist patient to increase activity and self care with guidance from PT/OT  - Encourage visually impaired, hearing impaired and aphasic patients to use assistive/communication devices  Outcome: Progressing
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
## 2023-08-24 NOTE — PAYOR COMM NOTE
Discharge Notification    Patient Name: Stanley Mustafa  Payor: Sharita Ortizy #: [id number]  Authorization Number: N/A  Admit Date/Time: 8/22/2023 10:49 AM  Discharge Date/Time: 8/23/2023 11:00 AM

## 2023-11-10 ENCOUNTER — HOSPITAL ENCOUNTER (EMERGENCY)
Facility: HOSPITAL | Age: 83
Discharge: HOME | End: 2023-11-10
Attending: EMERGENCY MEDICINE
Payer: MEDICARE

## 2023-11-10 ENCOUNTER — APPOINTMENT (OUTPATIENT)
Dept: GENERAL RADIOLOGY | Facility: HOSPITAL | Age: 83
End: 2023-11-10
Attending: EMERGENCY MEDICINE
Payer: MEDICARE

## 2023-11-10 ENCOUNTER — APPOINTMENT (OUTPATIENT)
Dept: CV DIAGNOSTICS | Facility: HOSPITAL | Age: 83
End: 2023-11-10
Attending: INTERNAL MEDICINE
Payer: MEDICARE

## 2023-11-10 VITALS
TEMPERATURE: 97 F | DIASTOLIC BLOOD PRESSURE: 66 MMHG | RESPIRATION RATE: 21 BRPM | SYSTOLIC BLOOD PRESSURE: 185 MMHG | HEART RATE: 58 BPM | WEIGHT: 168 LBS | HEIGHT: 67 IN | OXYGEN SATURATION: 95 % | BODY MASS INDEX: 26.37 KG/M2

## 2023-11-10 DIAGNOSIS — R07.89 CHEST PRESSURE: Primary | ICD-10-CM

## 2023-11-10 PROBLEM — D64.9 ANEMIA: Status: ACTIVE | Noted: 2023-11-10

## 2023-11-10 LAB
ALBUMIN SERPL-MCNC: 3.6 G/DL (ref 3.4–5)
ALBUMIN/GLOB SERPL: 1.2 {RATIO} (ref 1–2)
ALP LIVER SERPL-CCNC: 44 U/L
ALT SERPL-CCNC: 17 U/L
ANION GAP SERPL CALC-SCNC: 3 MMOL/L (ref 0–18)
AST SERPL-CCNC: 12 U/L (ref 15–37)
ATRIAL RATE: 60 BPM
BASOPHILS # BLD AUTO: 0.01 X10(3) UL (ref 0–0.2)
BASOPHILS NFR BLD AUTO: 0.1 %
BILIRUB SERPL-MCNC: 0.5 MG/DL (ref 0.1–2)
BUN BLD-MCNC: 17 MG/DL (ref 9–23)
CALCIUM BLD-MCNC: 9 MG/DL (ref 8.5–10.1)
CHLORIDE SERPL-SCNC: 111 MMOL/L (ref 98–112)
CO2 SERPL-SCNC: 27 MMOL/L (ref 21–32)
CREAT BLD-MCNC: 0.59 MG/DL
EGFRCR SERPLBLD CKD-EPI 2021: 88 ML/MIN/1.73M2 (ref 60–?)
EOSINOPHIL # BLD AUTO: 0.06 X10(3) UL (ref 0–0.7)
EOSINOPHIL NFR BLD AUTO: 0.9 %
ERYTHROCYTE [DISTWIDTH] IN BLOOD BY AUTOMATED COUNT: 11.9 %
GLOBULIN PLAS-MCNC: 3 G/DL (ref 2.8–4.4)
GLUCOSE BLD-MCNC: 101 MG/DL (ref 70–99)
HCT VFR BLD AUTO: 36.3 %
HGB BLD-MCNC: 11.8 G/DL
IMM GRANULOCYTES # BLD AUTO: 0.09 X10(3) UL (ref 0–1)
IMM GRANULOCYTES NFR BLD: 1.3 %
LYMPHOCYTES # BLD AUTO: 2.08 X10(3) UL (ref 1–4)
LYMPHOCYTES NFR BLD AUTO: 29.5 %
MCH RBC QN AUTO: 33.6 PG (ref 26–34)
MCHC RBC AUTO-ENTMCNC: 32.5 G/DL (ref 31–37)
MCV RBC AUTO: 103.4 FL
MONOCYTES # BLD AUTO: 0.59 X10(3) UL (ref 0.1–1)
MONOCYTES NFR BLD AUTO: 8.4 %
NEUTROPHILS # BLD AUTO: 4.21 X10 (3) UL (ref 1.5–7.7)
NEUTROPHILS # BLD AUTO: 4.21 X10(3) UL (ref 1.5–7.7)
NEUTROPHILS NFR BLD AUTO: 59.8 %
OSMOLALITY SERPL CALC.SUM OF ELEC: 294 MOSM/KG (ref 275–295)
P AXIS: 22 DEGREES
P-R INTERVAL: 166 MS
PLATELET # BLD AUTO: 185 10(3)UL (ref 150–450)
POTASSIUM SERPL-SCNC: 4 MMOL/L (ref 3.5–5.1)
PROT SERPL-MCNC: 6.6 G/DL (ref 6.4–8.2)
Q-T INTERVAL: 412 MS
QRS DURATION: 78 MS
QTC CALCULATION (BEZET): 412 MS
R AXIS: -13 DEGREES
RBC # BLD AUTO: 3.51 X10(6)UL
SODIUM SERPL-SCNC: 141 MMOL/L (ref 136–145)
T AXIS: 11 DEGREES
TROPONIN I SERPL HS-MCNC: 6 NG/L
VENTRICULAR RATE: 60 BPM
WBC # BLD AUTO: 7 X10(3) UL (ref 4–11)

## 2023-11-10 PROCEDURE — 99285 EMERGENCY DEPT VISIT HI MDM: CPT

## 2023-11-10 PROCEDURE — 80053 COMPREHEN METABOLIC PANEL: CPT | Performed by: EMERGENCY MEDICINE

## 2023-11-10 PROCEDURE — 85025 COMPLETE CBC W/AUTO DIFF WBC: CPT | Performed by: EMERGENCY MEDICINE

## 2023-11-10 PROCEDURE — 93005 ELECTROCARDIOGRAM TRACING: CPT

## 2023-11-10 PROCEDURE — 71045 X-RAY EXAM CHEST 1 VIEW: CPT | Performed by: EMERGENCY MEDICINE

## 2023-11-10 PROCEDURE — 78452 HT MUSCLE IMAGE SPECT MULT: CPT | Performed by: INTERNAL MEDICINE

## 2023-11-10 PROCEDURE — 93017 CV STRESS TEST TRACING ONLY: CPT | Performed by: INTERNAL MEDICINE

## 2023-11-10 PROCEDURE — 93010 ELECTROCARDIOGRAM REPORT: CPT

## 2023-11-10 PROCEDURE — 84484 ASSAY OF TROPONIN QUANT: CPT | Performed by: EMERGENCY MEDICINE

## 2023-11-10 PROCEDURE — 96375 TX/PRO/DX INJ NEW DRUG ADDON: CPT

## 2023-11-10 PROCEDURE — 93018 CV STRESS TEST I&R ONLY: CPT | Performed by: INTERNAL MEDICINE

## 2023-11-10 PROCEDURE — 96374 THER/PROPH/DIAG INJ IV PUSH: CPT

## 2023-11-10 RX ORDER — REGADENOSON 0.08 MG/ML
INJECTION, SOLUTION INTRAVENOUS
Status: COMPLETED
Start: 2023-11-10 | End: 2023-11-10

## 2023-11-10 RX ORDER — AMINOPHYLLINE 25 MG/ML
INJECTION, SOLUTION INTRAVENOUS
Status: COMPLETED
Start: 2023-11-10 | End: 2023-11-10

## 2023-11-10 RX ORDER — ASPIRIN 81 MG/1
324 TABLET, CHEWABLE ORAL ONCE
Status: COMPLETED | OUTPATIENT
Start: 2023-11-10 | End: 2023-11-10

## 2023-11-10 NOTE — ED INITIAL ASSESSMENT (HPI)
Pt to ER brought in by EMS with c/o numbness underneath breast. Pt states \"it feels like a numb pain to chest that radiates to her jaw. \"

## 2023-11-10 NOTE — PROGRESS NOTES
Dianna Crosser given as ordered. Patient complained of HA, nausea and lower GI upset late into recovery. Aminophylline 125mg IV given per protocol. Patient stated feeling relief of these symptoms. Denied Chest pain. Nuclear images pending.

## 2023-11-10 NOTE — DISCHARGE INSTRUCTIONS
Return for new or worsening symptoms such as further chest discomfort, any shortness of breath or fever

## 2023-11-10 NOTE — ED QUICK NOTES
Spoke to cardiac diagnostics, cardiology will come see pt first before test.    Pt states \"she has not have any caffeine today. \"

## 2023-12-25 ENCOUNTER — APPOINTMENT (OUTPATIENT)
Dept: GENERAL RADIOLOGY | Facility: HOSPITAL | Age: 83
End: 2023-12-25
Attending: EMERGENCY MEDICINE
Payer: MEDICARE

## 2023-12-25 ENCOUNTER — HOSPITAL ENCOUNTER (EMERGENCY)
Facility: HOSPITAL | Age: 83
Discharge: HOME | End: 2023-12-25
Attending: EMERGENCY MEDICINE
Payer: MEDICARE

## 2023-12-25 VITALS
HEART RATE: 61 BPM | HEIGHT: 66 IN | RESPIRATION RATE: 18 BRPM | BODY MASS INDEX: 26.84 KG/M2 | OXYGEN SATURATION: 91 % | WEIGHT: 167 LBS | DIASTOLIC BLOOD PRESSURE: 65 MMHG | SYSTOLIC BLOOD PRESSURE: 180 MMHG | TEMPERATURE: 98 F

## 2023-12-25 DIAGNOSIS — M54.9 SEVERE BACK PAIN: ICD-10-CM

## 2023-12-25 DIAGNOSIS — S22.070A COMPRESSION FRACTURE OF T9 VERTEBRA, INITIAL ENCOUNTER (HCC): Primary | ICD-10-CM

## 2023-12-25 DIAGNOSIS — S22.050A COMPRESSION FRACTURE OF T6 VERTEBRA, INITIAL ENCOUNTER (HCC): ICD-10-CM

## 2023-12-25 PROCEDURE — 99284 EMERGENCY DEPT VISIT MOD MDM: CPT

## 2023-12-25 PROCEDURE — 96376 TX/PRO/DX INJ SAME DRUG ADON: CPT

## 2023-12-25 PROCEDURE — 96374 THER/PROPH/DIAG INJ IV PUSH: CPT

## 2023-12-25 PROCEDURE — 99285 EMERGENCY DEPT VISIT HI MDM: CPT

## 2023-12-25 PROCEDURE — 71111 X-RAY EXAM RIBS/CHEST4/> VWS: CPT | Performed by: EMERGENCY MEDICINE

## 2023-12-25 PROCEDURE — 72072 X-RAY EXAM THORAC SPINE 3VWS: CPT | Performed by: EMERGENCY MEDICINE

## 2023-12-25 RX ORDER — HYDROMORPHONE HYDROCHLORIDE 1 MG/ML
0.5 INJECTION, SOLUTION INTRAMUSCULAR; INTRAVENOUS; SUBCUTANEOUS EVERY 30 MIN PRN
Status: DISCONTINUED | OUTPATIENT
Start: 2023-12-25 | End: 2023-12-25

## 2023-12-25 RX ORDER — LOSARTAN POTASSIUM 50 MG/1
50 TABLET ORAL DAILY
Status: COMPLETED | OUTPATIENT
Start: 2023-12-25 | End: 2023-12-25

## 2023-12-25 RX ORDER — AMLODIPINE BESYLATE 5 MG/1
10 TABLET ORAL DAILY
Status: COMPLETED | OUTPATIENT
Start: 2023-12-25 | End: 2023-12-25

## 2023-12-25 RX ORDER — METOPROLOL SUCCINATE 25 MG/1
25 TABLET, EXTENDED RELEASE ORAL
Status: COMPLETED | OUTPATIENT
Start: 2023-12-25 | End: 2023-12-25

## 2023-12-25 NOTE — ED INITIAL ASSESSMENT (HPI)
PT states that she called the ambulance due to severe, sudden pain  in her back that occurred while standing up from sitting position.  PT states that current pain  level is 8/10

## 2023-12-25 NOTE — DISCHARGE INSTRUCTIONS
Okay to increase gabapentin slowly. Since you are taking 100 mg at bedtime along with Tylenol and Tylenol p.m., try the same 100 mg gabapentin in the morning along with Tylenol and again 1 more time during the day if needed. Okay to increase to 200 or even higher if needed, but please titrate up slowly. Follow-up with your pain specialist  Return for any problems      Neurologic Instructions    Call your doctor if you have:  increased pain or headache.   trouble seeing, walking or using your arms or legs. dizziness or passing out. any change in behavior (agitated or sleepy). trouble being awakened from sleep.   numbness in your face, arm or leg.   extreme weakness. trouble talking. nausea and vomiting. any new or severe symptoms. Take your medicines as prescribed. Most important, see a doctor again as discussed. If you have problems that we have not discussed, call or visit your doctor right away. If you cannot reach your doctor, return to the Emergency Department.

## 2023-12-26 ENCOUNTER — HOSPITAL ENCOUNTER (INPATIENT)
Facility: HOSPITAL | Age: 83
LOS: 4 days | Discharge: HOME | End: 2023-12-30
Attending: EMERGENCY MEDICINE | Admitting: HOSPITALIST
Payer: MEDICARE

## 2023-12-26 ENCOUNTER — APPOINTMENT (OUTPATIENT)
Dept: MRI IMAGING | Facility: HOSPITAL | Age: 83
End: 2023-12-26
Attending: EMERGENCY MEDICINE
Payer: MEDICARE

## 2023-12-26 DIAGNOSIS — S22.070D CLOSED WEDGE COMPRESSION FRACTURE OF T9 VERTEBRA WITH ROUTINE HEALING, SUBSEQUENT ENCOUNTER: Primary | ICD-10-CM

## 2023-12-26 DIAGNOSIS — E78.00 HYPERCHOLESTEREMIA: ICD-10-CM

## 2023-12-26 DIAGNOSIS — R06.09 EXERTIONAL DYSPNEA: ICD-10-CM

## 2023-12-26 DIAGNOSIS — D64.9 ANEMIA, UNSPECIFIED TYPE: ICD-10-CM

## 2023-12-26 DIAGNOSIS — I10 ESSENTIAL HYPERTENSION: ICD-10-CM

## 2023-12-26 DIAGNOSIS — I47.10 PSVT (PAROXYSMAL SUPRAVENTRICULAR TACHYCARDIA): ICD-10-CM

## 2023-12-26 PROBLEM — M54.9 BACK PAIN: Status: ACTIVE | Noted: 2023-12-26

## 2023-12-26 LAB
ALBUMIN SERPL-MCNC: 2.8 G/DL (ref 3.4–5)
ALBUMIN/GLOB SERPL: 0.9 {RATIO} (ref 1–2)
ALP LIVER SERPL-CCNC: 91 U/L
ALT SERPL-CCNC: 46 U/L
ANION GAP SERPL CALC-SCNC: 4 MMOL/L (ref 0–18)
AST SERPL-CCNC: 62 U/L (ref 15–37)
BASOPHILS # BLD AUTO: 0.02 X10(3) UL (ref 0–0.2)
BASOPHILS NFR BLD AUTO: 0.2 %
BILIRUB SERPL-MCNC: 0.4 MG/DL (ref 0.1–2)
BUN BLD-MCNC: 21 MG/DL (ref 9–23)
CALCIUM BLD-MCNC: 8.4 MG/DL (ref 8.5–10.1)
CHLORIDE SERPL-SCNC: 109 MMOL/L (ref 98–112)
CO2 SERPL-SCNC: 26 MMOL/L (ref 21–32)
CREAT BLD-MCNC: 0.36 MG/DL
EGFRCR SERPLBLD CKD-EPI 2021: 99 ML/MIN/1.73M2 (ref 60–?)
EOSINOPHIL # BLD AUTO: 0.08 X10(3) UL (ref 0–0.7)
EOSINOPHIL NFR BLD AUTO: 0.6 %
ERYTHROCYTE [DISTWIDTH] IN BLOOD BY AUTOMATED COUNT: 12.4 %
GLOBULIN PLAS-MCNC: 3 G/DL (ref 2.8–4.4)
GLUCOSE BLD-MCNC: 111 MG/DL (ref 70–99)
HCT VFR BLD AUTO: 35.4 %
HGB BLD-MCNC: 11.7 G/DL
IMM GRANULOCYTES # BLD AUTO: 0.06 X10(3) UL (ref 0–1)
IMM GRANULOCYTES NFR BLD: 0.5 %
INR BLD: 1.06 (ref 0.8–1.2)
LYMPHOCYTES # BLD AUTO: 1.47 X10(3) UL (ref 1–4)
LYMPHOCYTES NFR BLD AUTO: 11.8 %
MCH RBC QN AUTO: 33.1 PG (ref 26–34)
MCHC RBC AUTO-ENTMCNC: 33.1 G/DL (ref 31–37)
MCV RBC AUTO: 100 FL
MONOCYTES # BLD AUTO: 1.04 X10(3) UL (ref 0.1–1)
MONOCYTES NFR BLD AUTO: 8.3 %
NEUTROPHILS # BLD AUTO: 9.83 X10 (3) UL (ref 1.5–7.7)
NEUTROPHILS # BLD AUTO: 9.83 X10(3) UL (ref 1.5–7.7)
NEUTROPHILS NFR BLD AUTO: 78.6 %
OSMOLALITY SERPL CALC.SUM OF ELEC: 292 MOSM/KG (ref 275–295)
PLATELET # BLD AUTO: 234 10(3)UL (ref 150–450)
POTASSIUM SERPL-SCNC: 3.8 MMOL/L (ref 3.5–5.1)
PROT SERPL-MCNC: 5.8 G/DL (ref 6.4–8.2)
PROTHROMBIN TIME: 13.9 SECONDS (ref 11.6–14.8)
RBC # BLD AUTO: 3.54 X10(6)UL
SODIUM SERPL-SCNC: 139 MMOL/L (ref 136–145)
WBC # BLD AUTO: 12.5 X10(3) UL (ref 4–11)

## 2023-12-26 PROCEDURE — 72146 MRI CHEST SPINE W/O DYE: CPT | Performed by: EMERGENCY MEDICINE

## 2023-12-26 PROCEDURE — 72148 MRI LUMBAR SPINE W/O DYE: CPT | Performed by: EMERGENCY MEDICINE

## 2023-12-26 RX ORDER — FOLIC ACID 1 MG/1
1 TABLET ORAL DAILY
Status: DISCONTINUED | OUTPATIENT
Start: 2023-12-26 | End: 2023-12-30

## 2023-12-26 RX ORDER — SENNOSIDES 8.6 MG
17.2 TABLET ORAL NIGHTLY PRN
Status: DISCONTINUED | OUTPATIENT
Start: 2023-12-26 | End: 2023-12-30

## 2023-12-26 RX ORDER — HYDROCODONE BITARTRATE AND ACETAMINOPHEN 5; 325 MG/1; MG/1
2 TABLET ORAL EVERY 4 HOURS PRN
Status: DISCONTINUED | OUTPATIENT
Start: 2023-12-26 | End: 2023-12-30

## 2023-12-26 RX ORDER — HYDROMORPHONE HYDROCHLORIDE 1 MG/ML
0.4 INJECTION, SOLUTION INTRAMUSCULAR; INTRAVENOUS; SUBCUTANEOUS EVERY 2 HOUR PRN
Status: DISCONTINUED | OUTPATIENT
Start: 2023-12-26 | End: 2023-12-30

## 2023-12-26 RX ORDER — AMLODIPINE BESYLATE 10 MG/1
10 TABLET ORAL DAILY
COMMUNITY

## 2023-12-26 RX ORDER — HYDROMORPHONE HYDROCHLORIDE 1 MG/ML
0.5 INJECTION, SOLUTION INTRAMUSCULAR; INTRAVENOUS; SUBCUTANEOUS EVERY 30 MIN PRN
Status: DISCONTINUED | OUTPATIENT
Start: 2023-12-26 | End: 2023-12-30

## 2023-12-26 RX ORDER — ACETAMINOPHEN 325 MG/1
650 TABLET ORAL EVERY 4 HOURS PRN
Status: DISCONTINUED | OUTPATIENT
Start: 2023-12-26 | End: 2023-12-30

## 2023-12-26 RX ORDER — SULFASALAZINE 500 MG/1
1000 TABLET ORAL 2 TIMES DAILY
Status: DISCONTINUED | OUTPATIENT
Start: 2023-12-26 | End: 2023-12-30

## 2023-12-26 RX ORDER — TIMOLOL MALEATE 5 MG/ML
1 SOLUTION/ DROPS OPHTHALMIC 2 TIMES DAILY
Status: DISCONTINUED | OUTPATIENT
Start: 2023-12-26 | End: 2023-12-30

## 2023-12-26 RX ORDER — ATORVASTATIN CALCIUM 10 MG/1
10 TABLET, FILM COATED ORAL NIGHTLY
Status: DISCONTINUED | OUTPATIENT
Start: 2023-12-26 | End: 2023-12-30

## 2023-12-26 RX ORDER — METOPROLOL SUCCINATE 25 MG/1
25 TABLET, EXTENDED RELEASE ORAL 2 TIMES DAILY
Status: DISCONTINUED | OUTPATIENT
Start: 2023-12-26 | End: 2023-12-30

## 2023-12-26 RX ORDER — BISACODYL 10 MG
10 SUPPOSITORY, RECTAL RECTAL
Status: DISCONTINUED | OUTPATIENT
Start: 2023-12-26 | End: 2023-12-30

## 2023-12-26 RX ORDER — ENEMA 19; 7 G/133ML; G/133ML
1 ENEMA RECTAL ONCE AS NEEDED
Status: DISCONTINUED | OUTPATIENT
Start: 2023-12-26 | End: 2023-12-30

## 2023-12-26 RX ORDER — METOCLOPRAMIDE HYDROCHLORIDE 5 MG/ML
10 INJECTION INTRAMUSCULAR; INTRAVENOUS EVERY 8 HOURS PRN
Status: DISCONTINUED | OUTPATIENT
Start: 2023-12-26 | End: 2023-12-30

## 2023-12-26 RX ORDER — OMEPRAZOLE 20 MG/1
20 CAPSULE, DELAYED RELEASE ORAL
COMMUNITY

## 2023-12-26 RX ORDER — HYDROMORPHONE HYDROCHLORIDE 1 MG/ML
0.5 INJECTION, SOLUTION INTRAMUSCULAR; INTRAVENOUS; SUBCUTANEOUS EVERY 30 MIN PRN
Status: DISCONTINUED | OUTPATIENT
Start: 2023-12-26 | End: 2023-12-26

## 2023-12-26 RX ORDER — ONDANSETRON 2 MG/ML
4 INJECTION INTRAMUSCULAR; INTRAVENOUS EVERY 6 HOURS PRN
Status: DISCONTINUED | OUTPATIENT
Start: 2023-12-26 | End: 2023-12-30

## 2023-12-26 RX ORDER — HYDROMORPHONE HYDROCHLORIDE 1 MG/ML
0.2 INJECTION, SOLUTION INTRAMUSCULAR; INTRAVENOUS; SUBCUTANEOUS EVERY 2 HOUR PRN
Status: DISCONTINUED | OUTPATIENT
Start: 2023-12-26 | End: 2023-12-30

## 2023-12-26 RX ORDER — LOSARTAN POTASSIUM 50 MG/1
50 TABLET ORAL 2 TIMES DAILY
Status: DISCONTINUED | OUTPATIENT
Start: 2023-12-26 | End: 2023-12-30

## 2023-12-26 RX ORDER — POLYETHYLENE GLYCOL 3350 17 G/17G
17 POWDER, FOR SOLUTION ORAL DAILY PRN
Status: DISCONTINUED | OUTPATIENT
Start: 2023-12-26 | End: 2023-12-30

## 2023-12-26 RX ORDER — ONDANSETRON 2 MG/ML
4 INJECTION INTRAMUSCULAR; INTRAVENOUS ONCE
Status: COMPLETED | OUTPATIENT
Start: 2023-12-26 | End: 2023-12-26

## 2023-12-26 RX ORDER — HYDROMORPHONE HYDROCHLORIDE 1 MG/ML
0.8 INJECTION, SOLUTION INTRAMUSCULAR; INTRAVENOUS; SUBCUTANEOUS EVERY 2 HOUR PRN
Status: DISCONTINUED | OUTPATIENT
Start: 2023-12-26 | End: 2023-12-30

## 2023-12-26 RX ORDER — HYDROCODONE BITARTRATE AND ACETAMINOPHEN 5; 325 MG/1; MG/1
1 TABLET ORAL EVERY 4 HOURS PRN
Status: DISCONTINUED | OUTPATIENT
Start: 2023-12-26 | End: 2023-12-30

## 2023-12-26 RX ORDER — MELATONIN
3 NIGHTLY PRN
Status: DISCONTINUED | OUTPATIENT
Start: 2023-12-26 | End: 2023-12-30

## 2023-12-26 RX ORDER — HEPARIN SODIUM 5000 [USP'U]/ML
5000 INJECTION, SOLUTION INTRAVENOUS; SUBCUTANEOUS EVERY 8 HOURS SCHEDULED
Status: DISCONTINUED | OUTPATIENT
Start: 2023-12-26 | End: 2023-12-30

## 2023-12-26 RX ORDER — KETOROLAC TROMETHAMINE 15 MG/ML
15 INJECTION, SOLUTION INTRAMUSCULAR; INTRAVENOUS ONCE
Status: COMPLETED | OUTPATIENT
Start: 2023-12-26 | End: 2023-12-26

## 2023-12-26 RX ORDER — BRIMONIDINE TARTRATE 2 MG/ML
1 SOLUTION/ DROPS OPHTHALMIC 2 TIMES DAILY
Status: DISCONTINUED | OUTPATIENT
Start: 2023-12-26 | End: 2023-12-30

## 2023-12-26 RX ORDER — ONDANSETRON 2 MG/ML
4 INJECTION INTRAMUSCULAR; INTRAVENOUS EVERY 4 HOURS PRN
Status: DISCONTINUED | OUTPATIENT
Start: 2023-12-26 | End: 2023-12-26

## 2023-12-26 NOTE — PLAN OF CARE
Patient alert and oriented x4. VSS on RA. Pain controlled with IV PRN meds. Denies n/t. SCDs in place, ankle pumps encouraged, IS encouraged. Pt rolling side to side. Tolerating diet, no c/o n/v. DTV 2100.     Plan: neurosurg to see, PT/OT eval, pain control

## 2023-12-27 LAB
ALBUMIN SERPL-MCNC: 2.9 G/DL (ref 3.4–5)
ALBUMIN/GLOB SERPL: 0.8 {RATIO} (ref 1–2)
ALP LIVER SERPL-CCNC: 158 U/L
ALT SERPL-CCNC: 110 U/L
ANION GAP SERPL CALC-SCNC: 6 MMOL/L (ref 0–18)
AST SERPL-CCNC: 75 U/L (ref 15–37)
BASOPHILS # BLD AUTO: 0.02 X10(3) UL (ref 0–0.2)
BASOPHILS NFR BLD AUTO: 0.2 %
BILIRUB SERPL-MCNC: 0.3 MG/DL (ref 0.1–2)
BUN BLD-MCNC: 21 MG/DL (ref 9–23)
CALCIUM BLD-MCNC: 8.8 MG/DL (ref 8.5–10.1)
CHLORIDE SERPL-SCNC: 103 MMOL/L (ref 98–112)
CO2 SERPL-SCNC: 28 MMOL/L (ref 21–32)
CREAT BLD-MCNC: 0.6 MG/DL
EGFRCR SERPLBLD CKD-EPI 2021: 88 ML/MIN/1.73M2 (ref 60–?)
EOSINOPHIL # BLD AUTO: 0.11 X10(3) UL (ref 0–0.7)
EOSINOPHIL NFR BLD AUTO: 1.2 %
ERYTHROCYTE [DISTWIDTH] IN BLOOD BY AUTOMATED COUNT: 12.6 %
GLOBULIN PLAS-MCNC: 3.7 G/DL (ref 2.8–4.4)
GLUCOSE BLD-MCNC: 148 MG/DL (ref 70–99)
HCT VFR BLD AUTO: 36.4 %
HGB BLD-MCNC: 11.9 G/DL
IMM GRANULOCYTES # BLD AUTO: 0.02 X10(3) UL (ref 0–1)
IMM GRANULOCYTES NFR BLD: 0.2 %
LYMPHOCYTES # BLD AUTO: 1.67 X10(3) UL (ref 1–4)
LYMPHOCYTES NFR BLD AUTO: 18.8 %
MCH RBC QN AUTO: 33.1 PG (ref 26–34)
MCHC RBC AUTO-ENTMCNC: 32.7 G/DL (ref 31–37)
MCV RBC AUTO: 101.4 FL
MONOCYTES # BLD AUTO: 0.7 X10(3) UL (ref 0.1–1)
MONOCYTES NFR BLD AUTO: 7.9 %
NEUTROPHILS # BLD AUTO: 6.38 X10 (3) UL (ref 1.5–7.7)
NEUTROPHILS # BLD AUTO: 6.38 X10(3) UL (ref 1.5–7.7)
NEUTROPHILS NFR BLD AUTO: 71.7 %
OSMOLALITY SERPL CALC.SUM OF ELEC: 290 MOSM/KG (ref 275–295)
PLATELET # BLD AUTO: 245 10(3)UL (ref 150–450)
POTASSIUM SERPL-SCNC: 4.6 MMOL/L (ref 3.5–5.1)
PROT SERPL-MCNC: 6.6 G/DL (ref 6.4–8.2)
RBC # BLD AUTO: 3.59 X10(6)UL
SODIUM SERPL-SCNC: 137 MMOL/L (ref 136–145)
WBC # BLD AUTO: 8.9 X10(3) UL (ref 4–11)

## 2023-12-27 PROCEDURE — 99221 1ST HOSP IP/OBS SF/LOW 40: CPT | Performed by: NEUROLOGICAL SURGERY

## 2023-12-27 PROCEDURE — 99222 1ST HOSP IP/OBS MODERATE 55: CPT | Performed by: NEUROLOGICAL SURGERY

## 2023-12-27 RX ORDER — TRAZODONE HYDROCHLORIDE 50 MG/1
50 TABLET ORAL ONCE
Status: COMPLETED | OUTPATIENT
Start: 2023-12-27 | End: 2023-12-27

## 2023-12-27 RX ORDER — AMLODIPINE BESYLATE 5 MG/1
10 TABLET ORAL DAILY
Status: DISCONTINUED | OUTPATIENT
Start: 2023-12-27 | End: 2023-12-30

## 2023-12-27 RX ORDER — PANTOPRAZOLE SODIUM 40 MG/1
40 TABLET, DELAYED RELEASE ORAL
Status: DISCONTINUED | OUTPATIENT
Start: 2023-12-27 | End: 2023-12-30

## 2023-12-27 NOTE — CM/SW NOTE
PT recommendations received for Home Health/ Home PT. Per PT note:    HOME SITUATION  Type of Home: Independent living facility   Home Layout: One level  Stairs to Enter : 0  Stairs to Bedroom: 0     Lives With: Spouse  Patient Owned Equipment: Rolling walker     Prior Level of [place]: Pt is typically mod ind with ADLS and ambulates with a cane. Home Health referrals sent via [employer]. SW/CM will provide pt with a list of available [facility] agencies prior to DC, to secure services. Met w/ pt this PM to discuss SDOH. Pt reports she does have problems with consistent transportation. Her  usually drives but has problems with swelling in his legs, which can make it difficult to drive at times. She has a daughter that sometimes can transport pt/her , however, pt is interested in any other available resources. Resources provided to pt. CM/SW will remain available for DC planning and/or support.      MAXWELL Rachel, VIA [facility]    [id number]

## 2023-12-27 NOTE — PLAN OF CARE
Patient A&Ox4. VSS on 2L NC while asleep, RA while awake. . Patient reports severe pain, see MAR for meds given. Patient denies any N/T at this time. Voiding via purewick. Plan for neurosurg to see, PT/OT eval, pain control. Updated patient on plan of care, verbalizes understanding. Call light within reach. 2140: Bladder scan completed.  Showing ~140 mL

## 2023-12-27 NOTE — PHYSICAL THERAPY NOTE
PHYSICAL THERAPY EVALUATION - INPATIENT     Room Number: 380/380-A  Evaluation Date: 12/27/2023  Type of Evaluation: Initial  Physician Order: PT Eval and Treat    Presenting Problem: T9 compression fracture  Co-Morbidities : ankylosing spondylitis, HTN, RA, R ankle surgery  Reason for Therapy: Mobility Dysfunction and Discharge Planning    History related to current admission: Patient is a 78year old female admitted on 12/26/2023 from home for back pain. Pt diagnosed with T9 compression fracture. Imaging:   MRI thoracic and lumbar spine -   1. Mild acute to subacute compression fracture of T9. Severe chronic compression fracture of T12 and moderate chronic compression fracture of L1 status post vertebral augmentation. 2. Mild to moderate degenerative changes in the thoracic and lumbar spine as described in detail above. 3. Mild bilateral pleural effusions. Xray ribs with chest -   No rib fracture, or other rib abnormality identified. ASSESSMENT   In this PT evaluation, the patient presents with the following impairments pain, decreased strength, and balance. These impairments and comorbidities manifest themselves as functional limitations in independent bed mobility, transfers, and gait. The patient is below baseline and would benefit from skilled inpatient PT to address the above deficits to assist patient in returning to prior to level of function. Functional outcome measures completed include [facility]. The -PAC '6-Clicks' Inpatient Basic Mobility Short Form was completed and this patient is demonstrating a Approx Degree of Impairment: 46.58%  degree of impairment in mobility. Research supports that patients with this level of impairment may benefit from [address] DISCHARGE RECOMMENDATIONS  PT Discharge Recommendations: Home with home health PT    PLAN  PT Treatment Plan: Bed mobility; Body mechanics; Endurance; Energy conservation;Patient education; Family education;Gait training;Range of motion;Strengthening;Transfer training;Balance training  Rehab Potential : Good  Frequency (Obs): 3-5x/week  Number of Visits to Meet Established Goals: 3      CURRENT GOALS    Goal #1 Patient is able to demonstrate supine - sit EOB @ level: supervision     Goal #2 Patient is able to demonstrate transfers Sit to/from Stand at assistance level: supervision     Goal #3 Patient is able to ambulate 150 feet with assist device: walker - rolling at assistance level: supervision     Goal #4    Goal #5    Goal #6    Goal Comments: Goals established on 12/27/2023    HOME SITUATION  Type of Home: [address] [place] [address] Jorge: One level  Stairs to Enter : 0     Stairs to [employer]: 0       Lives With: Spouse     Patient Owned Equipment: Rolling walker       Prior Level of [place]: Pt is typically mod ind with ADLS and ambulates with a cane. SUBJECTIVE  \"Because of the pain medication, I am doing okay\"       OBJECTIVE  Precautions: Spine;Bed/chair alarm  Fall Risk: High fall risk    WEIGHT BEARING RESTRICTION  Weight Bearing Restriction: None                PAIN ASSESSMENT  Rating: Unable to rate  Location: back  Management Techniques: Activity promotion; Body mechanics; Relaxation;Repositioning    COGNITION  Overall Cognitive Status:  WFL - within functional limits    RANGE OF MOTION AND STRENGTH ASSESSMENT  Upper extremity ROM and strength are within functional limits     Lower extremity ROM is within functional limits     Lower extremity strength is within functional limits       BALANCE  Static Sitting: Good  Dynamic Sitting: Good  Static Standing: Poor +  Dynamic Standing: Poor +    ADDITIONAL TESTS                                    ACTIVITY TOLERANCE                         O2 WALK       NEUROLOGICAL FINDINGS                        AM-PAC '6-Clicks' INPATIENT SHORT FORM - BASIC MOBILITY  How much difficulty does the patient currently have. ..   Patient Difficulty: Turning over in bed (including adjusting bedclothes, sheets and blankets)?: A Little   Patient Difficulty: Sitting down on and standing up from a chair with arms (e.g., wheelchair, bedside commode, etc.): A Little   Patient Difficulty: Moving from lying on back to sitting on the side of the bed?: A Little   How much help from another person does the patient currently need. .. Help from Another: Moving to and from a bed to a chair (including a wheelchair)?: A Little   Help from Another: Need to walk in hospital room?: A Little   Help from Another: Climbing 3-5 steps with a railing?: A Little       AM-PAC Score:  Raw Score: 18   Approx Degree of Impairment: 46.58%   Standardized Score (AM-PAC Scale): 43.63   CMS Modifier (G-Code): CK    FUNCTIONAL ABILITY STATUS  Gait Assessment   Functional Mobility/Gait Assessment  Gait Assistance: Contact guard assist  Distance (ft): 80  Assistive Device: Rolling walker  Pattern: Shuffle    Skilled Therapy Provided: Per RN okay to work with pt. Pt received in supine and was agreeable to PT session. Bed Mobility:  Rolling: CGA via log roll   Supine to sit: CGA via log roll    Sit to supine: CGA via log roll      Transfer Mobility:  Sit to stand: min A    Stand to sit: min A  Gait = pt ambulated with RW and CGA     Therapist's Comments: Pt educated on role of therapy, goals for session, safety, fall prevention, spine precautions, log roll, activity recommendations, and discharge planning. Exercise/Education Provided:  Bed mobility  Body mechanics  Energy conservation  Functional activity tolerated  Gait training  Posture  Strengthening  Transfer training    Patient End of Session: In bed;Needs met;Call light within reach;RN aware of session/findings; All patient questions and concerns addressed;SCDs in place; Alarm set; Family present      Patient Evaluation Complexity Level:  History Moderate - 1 or 2 personal factors and/or co-morbidities   Examination of body systems Low - addressing 1-2 elements Clinical Presentation Low - Stable   Clinical Decision Making Low - Stable       PT Session Time: 25 minutes  Therapeutic Activity: 10 minutes

## 2023-12-28 LAB — NT-PROBNP SERPL-MCNC: 505 PG/ML (ref ?–450)

## 2023-12-28 NOTE — PLAN OF CARE
Problem: Patient/Family Goals  Goal: Patient/Family Long Term Goal  Description: Patient's Long Term Goal: Return to normal baseline function     Interventions:  - Follow MD recommendations   - PT/OT to see  - See additional Care Plan goals for specific interventions  Outcome: Progressing  Goal: Patient/Family Short Term Goal  Description: Patient's Short Term Goal: Pain control     Interventions:   - Follow MD recommendations   - Pharmacologic interventions   - Nonpharmacologic interventions   - See additional Care Plan goals for specific interventions  Outcome: Progressing

## 2023-12-28 NOTE — CM/SW NOTE
Pt provided choice list for [facility]  this PM. Pt requests time to review options and for SW/CM to follow up prior to DC for choice. CM/SW will remain available for DC planning and/or support.      CHARLIE MeadN, VIA [facility]    [id number]

## 2023-12-28 NOTE — PLAN OF CARE
Patient is alert and oriented x 4. Vitals stable on 2L oxygen prn. Pain to back managed by IV dilaudid. Denies numbness & tingling. Diminished urine production, bladder scan <400mL. Last BM 12/23/23 miralax given. Patient is up minimal assist with walker and gait belt. NPO at midnight for possible kypho tmrw. Plan of care discussed with patient.

## 2023-12-28 NOTE — PROGRESS NOTES
Assumed patient care at [phone]  Patient alert and oriented   Back pain management with prn dilaudid   Vitals stable, on O2 2lnc   NPO after MN for kyphoplasty today   Safety precautions in place   Call light is within reach  Continue poc   Pt with minimal u/o, bladder scan performed shows 411. Pt straight cath and got 400mls.

## 2023-12-29 ENCOUNTER — APPOINTMENT (OUTPATIENT)
Dept: INTERVENTIONAL RADIOLOGY/VASCULAR | Facility: HOSPITAL | Age: 83
End: 2023-12-29
Payer: MEDICARE

## 2023-12-29 LAB
BILIRUB UR QL STRIP.AUTO: NEGATIVE
COLOR UR AUTO: YELLOW
GLUCOSE UR STRIP.AUTO-MCNC: NORMAL MG/DL
KETONES UR STRIP.AUTO-MCNC: NEGATIVE MG/DL
LEUKOCYTE ESTERASE UR QL STRIP.AUTO: 500
PH UR STRIP.AUTO: 6 [PH] (ref 5–8)
PROT UR STRIP.AUTO-MCNC: NEGATIVE MG/DL
RBC #/AREA URNS AUTO: >10 /HPF
SP GR UR STRIP.AUTO: 1.01 (ref 1–1.03)
UROBILINOGEN UR STRIP.AUTO-MCNC: NORMAL MG/DL
WBC #/AREA URNS AUTO: >50 /HPF
YEAST UR QL: PRESENT /HPF

## 2023-12-29 PROCEDURE — 0PS43ZZ REPOSITION THORACIC VERTEBRA, PERCUTANEOUS APPROACH: ICD-10-PCS | Performed by: STUDENT IN AN ORGANIZED HEALTH CARE EDUCATION/TRAINING PROGRAM

## 2023-12-29 PROCEDURE — 0PU43JZ SUPPLEMENT THORACIC VERTEBRA WITH SYNTHETIC SUBSTITUTE, PERCUTANEOUS APPROACH: ICD-10-PCS | Performed by: STUDENT IN AN ORGANIZED HEALTH CARE EDUCATION/TRAINING PROGRAM

## 2023-12-29 RX ORDER — MIDAZOLAM HYDROCHLORIDE 1 MG/ML
INJECTION INTRAMUSCULAR; INTRAVENOUS
Status: COMPLETED
Start: 2023-12-29 | End: 2023-12-29

## 2023-12-29 RX ORDER — POLYETHYLENE GLYCOL 3350 17 G/17G
17 POWDER, FOR SOLUTION ORAL DAILY
Status: DISCONTINUED | OUTPATIENT
Start: 2023-12-29 | End: 2023-12-30

## 2023-12-29 RX ORDER — FUROSEMIDE 10 MG/ML
20 INJECTION INTRAMUSCULAR; INTRAVENOUS ONCE
Status: COMPLETED | OUTPATIENT
Start: 2023-12-29 | End: 2023-12-29

## 2023-12-29 RX ORDER — HYDROCODONE BITARTRATE AND ACETAMINOPHEN 5; 325 MG/1; MG/1
1 TABLET ORAL EVERY 4 HOURS PRN
Qty: 20 TABLET | Refills: 0 | Status: SHIPPED | OUTPATIENT
Start: 2023-12-29

## 2023-12-29 RX ORDER — LIDOCAINE HYDROCHLORIDE 10 MG/ML
INJECTION, SOLUTION INFILTRATION; PERINEURAL
Status: COMPLETED
Start: 2023-12-29 | End: 2023-12-29

## 2023-12-29 RX ORDER — LACTULOSE 10 G/15ML
30 SOLUTION ORAL
Status: DISPENSED | OUTPATIENT
Start: 2023-12-29 | End: 2023-12-29

## 2023-12-29 RX ORDER — CEFAZOLIN SODIUM/WATER 2 G/20 ML
SYRINGE (ML) INTRAVENOUS
Status: COMPLETED
Start: 2023-12-29 | End: 2023-12-29

## 2023-12-29 RX ORDER — METOPROLOL SUCCINATE 25 MG/1
25 TABLET, EXTENDED RELEASE ORAL 2 TIMES DAILY
Status: SHIPPED | COMMUNITY
Start: 2023-12-29

## 2023-12-29 NOTE — PLAN OF CARE
Patient A & O x4. VSS, on 2L NC. C/o mod pain, see MAR for pain medications given. NPO at MN. Purewick in place. Instructed to use call light.

## 2023-12-29 NOTE — PROCEDURES
[facility]  Procedure Note    Janis Yip Patient Status:  Inpatient    1940 MRN [id number]   [facility] 3SW-A Attending [facility] Day # 3 PCP Alla Yin MD     Procedure: T9 kyphoplasty    Pre-Procedure Diagnosis:  Acute-subacute compression fracture    Post-Procedure Diagnosis: Same    Anesthesia:  Local and Sedation    Findings:  T9 compression fracture. Successful kyphoplasty at T9.     Specimens: None    Blood Loss:  <5ml    Tourniquet Time: 0  Complications:  None  Drains:  None    Secondary Diagnosis:  None    Anniece Fleischer, MD  2023

## 2023-12-29 NOTE — PLAN OF CARE
Patient admitted for compression fracture. Medications given as ordered. Patient voiding via pure wick. Pain well controlled with PRN Norco and Dilaudid.   Plan for NPO at midnight for kyphoplasty at 8am.

## 2023-12-29 NOTE — PHYSICAL THERAPY NOTE
Attempted to see Pt this AM - RN aware of attempt. RN reported Pt had kypho this AM, but dealing with urinary issues and nausea. Prefers PT f/u in AM.  Will continue to follow.

## 2023-12-30 VITALS
HEART RATE: 72 BPM | TEMPERATURE: 98 F | RESPIRATION RATE: 18 BRPM | BODY MASS INDEX: 27 KG/M2 | SYSTOLIC BLOOD PRESSURE: 153 MMHG | OXYGEN SATURATION: 94 % | DIASTOLIC BLOOD PRESSURE: 50 MMHG | WEIGHT: 167 LBS

## 2023-12-30 RX ORDER — CIPROFLOXACIN 500 MG/1
500 TABLET, FILM COATED ORAL 2 TIMES DAILY
Qty: 10 TABLET | Refills: 0 | Status: SHIPPED | OUTPATIENT
Start: 2023-12-30 | End: 2024-01-04

## 2023-12-30 NOTE — PLAN OF CARE
NURSING DISCHARGE NOTE    Discharged Home via Wheelchair. Accompanied by Spouse  Belongings Taken by patient/family. AVS printed and discussed, IV removed, Rx given to Pt, reminded to  medicine at pharmacy. Pt ready to discharge home.

## 2023-12-30 NOTE — PLAN OF CARE
POD 1 T9 kyphoplasty, Pt is AAOX4, VSS, room air, O2 @ HS, Lt eye blind, up MOD w/ RW, IV ABX for UTI, PO pain meds, see MAR, voiding freely to restroom / brief, BM today, dressing remains CDI, plan for discharge home today. Pt doing well, all needs met, all safety measures in place, call light within reach, will CTM.

## 2023-12-30 NOTE — PHYSICAL THERAPY NOTE
Duplicate PT orders received. Pt is s/p kyphoplasty  12/29/23. Pt was evaluated by PT on 12/27/23 and is currently on PT caseload. Will discharge duplicate orders and continue to follow pt as appropriate.

## 2023-12-30 NOTE — PHYSICAL THERAPY NOTE
PHYSICAL THERAPY TREATMENT NOTE - INPATIENT    Room Number: 380/380-A     Session: 1     Number of Visits to Meet Established Goals: 3    Presenting Problem: T9 compression fracture  Co-Morbidities : ankylosing spondylitis, HTN, RA, R ankle surgery    History related to current admission: Patient is a 78year old female admitted on 12/26/2023 from home for back pain. Pt diagnosed with T9 compression fracture. Imaging:   MRI thoracic and lumbar spine -   1. Mild acute to subacute compression fracture of T9. Severe chronic compression fracture of T12 and moderate chronic compression fracture of L1 status post vertebral augmentation. 2. Mild to moderate degenerative changes in the thoracic and lumbar spine as described in detail above. 3. Mild bilateral pleural effusions. Xray ribs with chest -   No rib fracture, or other rib abnormality identified. Pt s/p kyphoplasty 12/29/23. ASSESSMENT     Pt currently at supervision level with amb with RW. Pt continues to present with decrease balance and increase pain. Pt may benefit from continued PT while in hospital setting to address above deficits and facilitate return to PLOF. DISCHARGE RECOMMENDATIONS  PT Discharge Recommendations: Home with home health PT     PLAN  PT Treatment Plan: Bed mobility; Body mechanics; Endurance; Energy conservation;Patient education; Family education;Gait training;Range of motion;Strengthening;Transfer training;Balance training  Rehab Potential : Good  Frequency (Obs): 3-5x/week    CURRENT GOALS     Goal #1 Patient is able to demonstrate supine - sit EOB @ level: supervision      Goal #2 Patient is able to demonstrate transfers Sit to/from Stand at assistance level: supervision      Goal #3 Patient is able to ambulate 150 feet with assist device: walker - rolling at assistance level: supervision      Goal #4     Goal #5     Goal #6     Goal Comments: Goals established on 12/27/2023 12/30/2023 all goals ongoing      SUBJECTIVE  \"I have pain in my back. \"    OBJECTIVE  Precautions: Spine;Bed/chair alarm    WEIGHT BEARING RESTRICTION  Weight Bearing Restriction: None                PAIN ASSESSMENT   Ratin  Location: back  Management Techniques: Activity promotion    BALANCE                                                                                                                       Static Sitting: Good  Dynamic Sitting: Good           Static Standing: Fair  Dynamic Standing: Fair -    ACTIVITY TOLERANCE                         O2 WALK         AM-PAC '6-Clicks' INPATIENT SHORT FORM - BASIC MOBILITY  How much difficulty does the patient currently have. .. Patient Difficulty: Turning over in bed (including adjusting bedclothes, sheets and blankets)?: A Little   Patient Difficulty: Sitting down on and standing up from a chair with arms (e.g., wheelchair, bedside commode, etc.): A Little   Patient Difficulty: Moving from lying on back to sitting on the side of the bed?: A Little   How much help from another person does the patient currently need. .. Help from Another: Moving to and from a bed to a chair (including a wheelchair)?: A Little   Help from Another: Need to walk in hospital room?: A Little   Help from Another: Climbing 3-5 steps with a railing?: A Little       AM-PAC Score:  Raw Score: 18   Approx Degree of Impairment: 46.58%   Standardized Score (AM-PAC Scale): 43.63   CMS Modifier (G-Code): CK    FUNCTIONAL ABILITY STATUS  Gait Assessment   Functional Mobility/Gait Assessment  Gait Assistance: Supervision  Distance (ft): 100  Assistive Device: Rolling walker  Pattern: Within Functional Limits    Skilled Therapy Provided    Bed Mobility:  Rolling: supervision    Supine<>Sit: CGA   Sit<>Supine: not tested     Transfer Mobility:  Sit<>Stand: supervision from EOB to RW   Stand<>Sit: supervision   Gait: pt amb x 100 feet with supervision and RW, steady gait.     Therapist's Comments: per RN pt ok to be seen. Pt received in bed and agreeable to therapy. Pt educated on spine prec and log roll technique for bed mobility. Pt denies dizziness during amb. Pt returns to room and continues working with OT for toilet trg. Pt left with OT amb to bathroom. Pt educated on ankle pumps for DVT prevention. Patient End of Session: With [address] [place] [address] staff;Needs met;Call light within reach;RN aware of session/findings; All patient questions and concerns addressed; Family present    PT Session Time: 10 minutes  Gait Training: 10 minutes

## 2023-12-30 NOTE — PLAN OF CARE
Pt a/ox4. Pain controlled with PO norco. Small dressing to back from kyphoplasty procedure, remains intact. CMS intact to BLE. Up with assist and walker, PT/OT to see in am.   UTI +, started on rocephin today. Making sure good zane care is provided. Plan to home, possible 12/30.

## 2023-12-30 NOTE — CM/SW NOTE
PT recs [facility]. SW placed phone call to patient. Patient choice [facility]. SW reserved in [address].      Flaca Barber, TALIW  /Discharge Planner

## 2023-12-30 NOTE — HOME CARE LIAISON
Received referral via [facility] for Home Health services. Spoke w/ patient and her  who is agreeable with [facility].  Contact information placed on AVS.

## 2024-01-02 ENCOUNTER — TELEPHONE (OUTPATIENT)
Dept: SURGERY | Facility: CLINIC | Age: 84
End: 2024-01-02

## 2024-01-02 NOTE — TELEPHONE ENCOUNTER
Mariya  from [facility] is requesting to speak with clinical staff in regards to patient having increased pain since 12/30/23. The patient was sent home with a script for norco but is stating pain has increased so much that she is considering going back to ER.     Patient had a T9 kyphoplasty  on 12/29/2023.      states that if she can not be reached to please call patient back at [phone] (home).

## 2024-01-03 NOTE — TELEPHONE ENCOUNTER
Patient was having a lot of pain after surgery. Home health was trying to go see her today. Primary care doctor called by home health nurse and she was prescribed gabapentin today by PCP.     Patient was in the ED 12/26/23 and consulted by Iris Layne.      ASSESSMENT:  84 yo female with compression fracture of T9     Plan:  MRI Thoracic and lumbar spine - compression fx of T9  Pain meds PRN  PT/OT eval  Will review case with Dr Moore who will follow with additional recommendations    I tried to call patient to assess her pain. Left message to call back.

## 2024-01-04 NOTE — PAYOR COMM NOTE
Discharge Notification    Patient Name: JAMIE BHAVIN M  Payor: [employer]  Subscriber #: [id number]  Authorization Number: [id number]  Admit Date/Time: 12/26/2023 10:19 AM  Discharge Date/Time: 12/30/2023 4:41 PM

## 2024-01-08 ENCOUNTER — APPOINTMENT (OUTPATIENT)
Dept: GENERAL RADIOLOGY | Facility: HOSPITAL | Age: 84
End: 2024-01-08
Attending: EMERGENCY MEDICINE
Payer: MEDICARE

## 2024-01-08 ENCOUNTER — HOSPITAL ENCOUNTER (EMERGENCY)
Facility: HOSPITAL | Age: 84
Discharge: HOME | End: 2024-01-08
Attending: EMERGENCY MEDICINE
Payer: MEDICARE

## 2024-01-08 VITALS
DIASTOLIC BLOOD PRESSURE: 51 MMHG | HEART RATE: 62 BPM | OXYGEN SATURATION: 96 % | HEIGHT: 66 IN | WEIGHT: 162 LBS | SYSTOLIC BLOOD PRESSURE: 150 MMHG | TEMPERATURE: 98 F | RESPIRATION RATE: 23 BRPM | BODY MASS INDEX: 26.03 KG/M2

## 2024-01-08 DIAGNOSIS — M54.50 THORACOLUMBAR BACK PAIN: Primary | ICD-10-CM

## 2024-01-08 DIAGNOSIS — M54.6 THORACOLUMBAR BACK PAIN: Primary | ICD-10-CM

## 2024-01-08 PROCEDURE — 72110 X-RAY EXAM L-2 SPINE 4/>VWS: CPT | Performed by: EMERGENCY MEDICINE

## 2024-01-08 PROCEDURE — 99283 EMERGENCY DEPT VISIT LOW MDM: CPT

## 2024-01-08 PROCEDURE — 72072 X-RAY EXAM THORAC SPINE 3VWS: CPT | Performed by: EMERGENCY MEDICINE

## 2024-01-08 PROCEDURE — 99284 EMERGENCY DEPT VISIT MOD MDM: CPT

## 2024-01-08 RX ORDER — HYDROCODONE BITARTRATE AND ACETAMINOPHEN 5; 325 MG/1; MG/1
1 TABLET ORAL ONCE
Status: COMPLETED | OUTPATIENT
Start: 2024-01-08 | End: 2024-01-08

## 2024-01-08 RX ORDER — HYDROCODONE BITARTRATE AND ACETAMINOPHEN 5; 325 MG/1; MG/1
1-2 TABLET ORAL EVERY 6 HOURS PRN
Qty: 20 TABLET | Refills: 0 | Status: SHIPPED | OUTPATIENT
Start: 2024-01-08 | End: 2024-01-20

## 2024-01-08 NOTE — ED PROVIDER NOTES
Patient Seen in: [facility] Emergency Department      History     Chief Complaint   Patient presents with    Back Pain     Stated Complaint: back surgery 2 weeks ago/chronic back pain    Subjective:   HPI    Patient presents with recurrent back pain throughout her thoracic and lumbar region.  The patient states that she has had similar pain numerous times in the past and in fact has undergone multiple kyphoplasties with the most recent kyphoplasty performed late last month.  There is no history of acute trauma.  Patient has no distal paresthesias, numbness or weakness.  She has not no bowel or bladder dysfunction.    Objective:   Past Medical History:   Diagnosis Date    Ankylosing spondylitis (HCC)     BACK SHOULDERS ARMS- receives an infusion  every 2 mos    Aortic atherosclerosis (HCC) 06/27/2018    Chart review.  Noted on CT 3/1/18.    Arrhythmia     PALPATATIONS    Arteritis, unspecified (HCC)     Left     Back problem     Cataract     Bilateral    Cholelithiasis     Collagenous colitis 02/2010    Diffuse cystic mastopathy     Disorder of bone and cartilage, unspecified     osteopenia    Elevated blood pressure reading without diagnosis of hypertension     Esophageal reflux     Glaucoma     left eye    High blood pressure     High cholesterol     Indeterminate PPD     Father had TB, seen by Dr. Ott, treated for 9 months    Intestinal disaccharidase deficiencies and disaccharide malabsorption     Muscle weakness     USES CANE    Other and unspecified hyperlipidemia     Problems with swallowing     COFFEE WATER COUGHING SPELL AFTER TAKING    Rheumatoid arthritis and other inflammatory polyarthropathies     RA    Visual impairment     iritis              Past Surgical History:   Procedure Laterality Date    APPENDECTOMY      BIOPSY OF BREAST, INCISIONAL      L Breast    CATARACT      CHOLECYSTECTOMY  2005    COLONOSCOPY,BIOPSY  02/13/2006    nonspecific erythema transverse colon (bx marked acute &  chronic colitis), ileum wnl    COLONOSCOPY,DIAGNOSTIC  1990    mild inflammatory change sigmoid colon    COLONOSCOPY,DIAGNOSTIC  02/03/2010    Dr. Rodarte, mild erythema of the rectosigmoid, possibly a variant of normal, bx show collagenous colitis, ileum normal    EGD N/A 08/09/2021    Procedure: ESOPHAGOGASTRODUODENOSCOPY WITH DILATION with bxs, COLONOSCOPY with bxs;  Surgeon: Yaya Cook MD;  Location: [facility]    HEMORRHOIDECTOMY,INT/EXT,SIMPLE      NEEDLE BIOPSY LEFT      pt cannot remember    NEEDLE BIOPSY RIGHT      pt cannot remember    OTHER AMBL SURG  2005    R leg tendon surgery with post-op wound infection    OTHER SURGICAL HISTORY      right ankle surgery    OTHER SURGICAL HISTORY  05/07/2013     left 1st metatarsophalangeal fusion. Weil osteotomy 2nd metatarsal joint and metatarsophalgeal capsulotomy and extensor digitorum longus lengthening ans 2nd hammer toe. 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    SKIN SURGERY Left 11/19/2015    ESC of SCC to left lateral malleolus    SKIN SURGERY  03/29/2022    BCC- left posterior postauricular neck, MMS     TOTAL ABDOM HYSTERECTOMY      w/BSO    UP GI ENDOSCOPY,BALL DIL,30MM  05/04/2009    UP GI ENDOSCOPY,BALL DIL,30MM  06/12/2014    distal esophageal stricture, bx and dilated 18-20 mm    UPPER GI ENDOSCOPY,BIOPSY  05/04/2009    antral gastric erosions, distal esophageal stricture, gastric bx, esophageal dilation 18-20 mm, Performed by RACHEL GLEASON at [facility], Performed by RACHEL GLEASON at [facility]    UPPER GI ENDOSCOPY,BIOPSY  02/2010    3 gastric ulcers from diclofenac, bx for H. pylori was negative    UPPER GI  ENDOSCOPY,DIAGNOSIS  1990    wnl                Social History     Socioeconomic History    Marital status:    Tobacco Use    Smoking status: Never    Smokeless tobacco: Never    Tobacco comments:     non-smoker   Vaping Use    Vaping Use: Never used   Substance and Sexual Activity    Alcohol use: Not Currently     Alcohol/week: 0.0 standard drinks of alcohol     Comment: \"rare\"    Drug use: No   Social History Narrative    : yes    Children: no    Exercise: walks    Employment: retired  @     Caffeine intake: moderate                 Social Determinants of Health     Food Insecurity: No Food Insecurity (12/26/2023)    Food Insecurity     Food Insecurity: Never true   Transportation Needs: Unmet Transportation Needs (12/26/2023)    Transportation Needs     Lack of Transportation: Yes   Physical Activity: Sufficiently Active (3/7/2022)    Exercise Vital Sign     Days of Exercise per Week: 6 days     Minutes of Exercise per Session: 30 min   Housing Stability: Low Risk  (12/26/2023)    Housing Stability     Housing Instability: No              Review of Systems    Positive for stated complaint: back surgery 2 weeks ago/chronic back pain  Other systems are as noted in HPI.  Constitutional and vital signs reviewed.      All other systems reviewed and negative except as noted above.    Physical Exam     ED Triage Vitals [01/08/24 1143]   /57   Pulse 65   Resp 17   Temp 98.2 °F (36.8 °C)   Temp src    SpO2 94 %   O2 Device None (Room air)       Current:/51   Pulse 62   Temp 98.2 °F (36.8 °C)   Resp 23   Ht 167.6 cm (5' 6\")   Wt 73.5 kg   SpO2 96%   BMI 26.15 kg/m²         Physical Exam  Vitals and nursing note reviewed.   Constitutional:       General: She is not in acute distress.     Appearance: Normal appearance. She is well-developed.   Pulmonary:      Effort: Pulmonary effort is normal. No respiratory distress.      Breath sounds: Normal breath sounds. No  wheezing.   Musculoskeletal:      Comments: Patient has scattered, generalized discomfort throughout her thoracolumbar spinal and paraspinal region.  She does have a significant degree of kyphosis.   Neurological:      Mental Status: She is alert and oriented to person, place, and time.      Comments: C1-4 sensation on back of head and neck intact  C5 deltoid strength intact  C6 biceps strength intact  C7 fingers & wrist extension intact  C8 fingers flexion intact  T1 finger abduction intact  T2-12 trunk sensation intact  L1-2 inner thigh sensation intact  L2 thigh adduction intact  L3 knee extension intact  L4 ankle dorsiflexion intact  L5 great toe extension intact  patellar reflex intact  S1 knee flexion intact  Achilles reflex intact  S2 toe plantar flexion intact  S3-5 groin & perinanal sensation intact                ED Course   Labs Reviewed - No data to display    ED Course as of 01/08/24 1648  ------------------------------------------------------------  Time: 01/08 1538  Comment: X-rays of the patient's thoracic and lumbar spine were performed.  Images were independently viewed by me and previous kyphoplasty's were noted, but no acute bony abnormality or new compression fractures are noted.     Medications   HYDROcodone-acetaminophen (Norco) 5-325 MG per tab 1 tablet (1 tablet Oral Given 1/8/24 1414)                MDM      Patient comes to the emergency department with ongoing thoracolumbar back pain.  Patient has previous history of osteoarthritis and several spinal compression fractures.  Differential includes acute fracture.  X-rays here in the emergency department revealed no new evidence of acute fractures.  Patient was discharged home with continuation of her previously prescribed hydrocodone.  She normally takes 1 tablet at a time and she was instructed that she could increase to 2 tablets at a time.  She was otherwise instructed follow-up with her primary physician for ongoing pain control and  possible physical therapy.  She was instructed to return immediately for any acute change or worsening symptoms.                                   Medical Decision Making      Disposition and Plan     Clinical Impression:  1. Thoracolumbar back pain         Disposition:  Discharge  1/8/2024  3:56 pm    Follow-up:  Cathy Martinez MD  [address] [address]  [phone]    Call in 1 day(s)            Medications Prescribed:  Discharge Medication List as of 1/8/2024  4:17 PM        START taking these medications    Details   !! HYDROcodone-acetaminophen 5-325 MG Oral Tab Take 1-2 tablets by mouth every 6 (six) hours as needed for Pain., Normal, Disp-20 tablet, R-0       !! - Potential duplicate medications found. Please discuss with provider.

## 2024-01-08 NOTE — ED INITIAL ASSESSMENT (HPI)
Patient in with back pain post op back surgery states she has not seen her surgeon for post op appt or even spoke with over the phone. States ' I'm not even sure of his name\"   Denies incontinence

## 2024-01-12 ENCOUNTER — HOSPITAL ENCOUNTER (INPATIENT)
Facility: HOSPITAL | Age: 84
LOS: 7 days | Discharge: NURSING, REHAB OR LONG-TERM CARE | End: 2024-01-20
Attending: STUDENT IN AN ORGANIZED HEALTH CARE EDUCATION/TRAINING PROGRAM | Admitting: HOSPITALIST
Payer: MEDICARE

## 2024-01-12 ENCOUNTER — APPOINTMENT (OUTPATIENT)
Dept: CT IMAGING | Facility: HOSPITAL | Age: 84
End: 2024-01-12
Attending: STUDENT IN AN ORGANIZED HEALTH CARE EDUCATION/TRAINING PROGRAM
Payer: MEDICARE

## 2024-01-12 DIAGNOSIS — R11.2 NAUSEA AND VOMITING, UNSPECIFIED VOMITING TYPE: ICD-10-CM

## 2024-01-12 DIAGNOSIS — S22.060A COMPRESSION FRACTURE OF T8 VERTEBRA, INITIAL ENCOUNTER (HCC): Primary | ICD-10-CM

## 2024-01-12 DIAGNOSIS — R10.9 ABDOMINAL PAIN, ACUTE: ICD-10-CM

## 2024-01-12 DIAGNOSIS — S22.081G BURST FRACTURE OF T12 VERTEBRA WITH DELAYED HEALING: ICD-10-CM

## 2024-01-12 DIAGNOSIS — M54.59 INTRACTABLE LOW BACK PAIN: ICD-10-CM

## 2024-01-12 PROBLEM — R11.10 VOMITING: Status: ACTIVE | Noted: 2024-01-12

## 2024-01-12 LAB
ALBUMIN SERPL-MCNC: 3.3 G/DL (ref 3.4–5)
ALBUMIN/GLOB SERPL: 0.8 {RATIO} (ref 1–2)
ALP LIVER SERPL-CCNC: 96 U/L
ALT SERPL-CCNC: 24 U/L
ANION GAP SERPL CALC-SCNC: 6 MMOL/L (ref 0–18)
AST SERPL-CCNC: 22 U/L (ref 15–37)
BASOPHILS # BLD AUTO: 0.01 X10(3) UL (ref 0–0.2)
BASOPHILS NFR BLD AUTO: 0.1 %
BILIRUB SERPL-MCNC: 0.5 MG/DL (ref 0.1–2)
BUN BLD-MCNC: 16 MG/DL (ref 9–23)
CALCIUM BLD-MCNC: 8.9 MG/DL (ref 8.5–10.1)
CHLORIDE SERPL-SCNC: 104 MMOL/L (ref 98–112)
CO2 SERPL-SCNC: 24 MMOL/L (ref 21–32)
CREAT BLD-MCNC: 0.6 MG/DL
EGFRCR SERPLBLD CKD-EPI 2021: 88 ML/MIN/1.73M2 (ref 60–?)
EOSINOPHIL # BLD AUTO: 0 X10(3) UL (ref 0–0.7)
EOSINOPHIL NFR BLD AUTO: 0 %
ERYTHROCYTE [DISTWIDTH] IN BLOOD BY AUTOMATED COUNT: 12.3 %
GLOBULIN PLAS-MCNC: 4.1 G/DL (ref 2.8–4.4)
GLUCOSE BLD-MCNC: 128 MG/DL (ref 70–99)
HCT VFR BLD AUTO: 36.1 %
HGB BLD-MCNC: 12.2 G/DL
IMM GRANULOCYTES # BLD AUTO: 0.04 X10(3) UL (ref 0–1)
IMM GRANULOCYTES NFR BLD: 0.4 %
LIPASE SERPL-CCNC: 16 U/L (ref 13–75)
LYMPHOCYTES # BLD AUTO: 0.32 X10(3) UL (ref 1–4)
LYMPHOCYTES NFR BLD AUTO: 3.1 %
MCH RBC QN AUTO: 33.5 PG (ref 26–34)
MCHC RBC AUTO-ENTMCNC: 33.8 G/DL (ref 31–37)
MCV RBC AUTO: 99.2 FL
MONOCYTES # BLD AUTO: 0.57 X10(3) UL (ref 0.1–1)
MONOCYTES NFR BLD AUTO: 5.5 %
NEUTROPHILS # BLD AUTO: 9.44 X10 (3) UL (ref 1.5–7.7)
NEUTROPHILS # BLD AUTO: 9.44 X10(3) UL (ref 1.5–7.7)
NEUTROPHILS NFR BLD AUTO: 90.9 %
OSMOLALITY SERPL CALC.SUM OF ELEC: 281 MOSM/KG (ref 275–295)
PLATELET # BLD AUTO: 233 10(3)UL (ref 150–450)
POTASSIUM SERPL-SCNC: 4.2 MMOL/L (ref 3.5–5.1)
PROT SERPL-MCNC: 7.4 G/DL (ref 6.4–8.2)
RBC # BLD AUTO: 3.64 X10(6)UL
SODIUM SERPL-SCNC: 134 MMOL/L (ref 136–145)
TROPONIN I SERPL HS-MCNC: 7 NG/L
WBC # BLD AUTO: 10.4 X10(3) UL (ref 4–11)

## 2024-01-12 PROCEDURE — 99285 EMERGENCY DEPT VISIT HI MDM: CPT

## 2024-01-12 PROCEDURE — 96361 HYDRATE IV INFUSION ADD-ON: CPT

## 2024-01-12 PROCEDURE — 74177 CT ABD & PELVIS W/CONTRAST: CPT | Performed by: STUDENT IN AN ORGANIZED HEALTH CARE EDUCATION/TRAINING PROGRAM

## 2024-01-12 PROCEDURE — 93005 ELECTROCARDIOGRAM TRACING: CPT

## 2024-01-12 PROCEDURE — 96375 TX/PRO/DX INJ NEW DRUG ADDON: CPT

## 2024-01-12 PROCEDURE — 83690 ASSAY OF LIPASE: CPT | Performed by: STUDENT IN AN ORGANIZED HEALTH CARE EDUCATION/TRAINING PROGRAM

## 2024-01-12 PROCEDURE — 85025 COMPLETE CBC W/AUTO DIFF WBC: CPT | Performed by: STUDENT IN AN ORGANIZED HEALTH CARE EDUCATION/TRAINING PROGRAM

## 2024-01-12 PROCEDURE — 96374 THER/PROPH/DIAG INJ IV PUSH: CPT

## 2024-01-12 PROCEDURE — 80053 COMPREHEN METABOLIC PANEL: CPT | Performed by: STUDENT IN AN ORGANIZED HEALTH CARE EDUCATION/TRAINING PROGRAM

## 2024-01-12 PROCEDURE — 84484 ASSAY OF TROPONIN QUANT: CPT | Performed by: STUDENT IN AN ORGANIZED HEALTH CARE EDUCATION/TRAINING PROGRAM

## 2024-01-12 PROCEDURE — 93010 ELECTROCARDIOGRAM REPORT: CPT

## 2024-01-12 RX ORDER — METHYLPREDNISOLONE 4 MG/1
4 TABLET ORAL DAILY
COMMUNITY
End: 2024-01-20

## 2024-01-12 RX ORDER — BACLOFEN 10 MG/1
10 TABLET ORAL 3 TIMES DAILY
COMMUNITY

## 2024-01-12 RX ORDER — AMLODIPINE BESYLATE 5 MG/1
10 TABLET ORAL DAILY
Status: DISCONTINUED | OUTPATIENT
Start: 2024-01-13 | End: 2024-01-15

## 2024-01-12 RX ORDER — LOSARTAN POTASSIUM 50 MG/1
50 TABLET ORAL 2 TIMES DAILY
Status: DISCONTINUED | OUTPATIENT
Start: 2024-01-13 | End: 2024-01-20

## 2024-01-12 RX ORDER — BRIMONIDINE TARTRATE 2 MG/ML
1 SOLUTION/ DROPS OPHTHALMIC 2 TIMES DAILY
Status: DISCONTINUED | OUTPATIENT
Start: 2024-01-13 | End: 2024-01-20

## 2024-01-12 RX ORDER — ATORVASTATIN CALCIUM 10 MG/1
10 TABLET, FILM COATED ORAL NIGHTLY
Status: DISCONTINUED | OUTPATIENT
Start: 2024-01-13 | End: 2024-01-15

## 2024-01-12 RX ORDER — SODIUM CHLORIDE 9 MG/ML
INJECTION, SOLUTION INTRAVENOUS CONTINUOUS
Status: ACTIVE | OUTPATIENT
Start: 2024-01-12 | End: 2024-01-13

## 2024-01-12 RX ORDER — IOHEXOL 350 MG/ML
80 INJECTION, SOLUTION INTRAVENOUS
Status: COMPLETED | OUTPATIENT
Start: 2024-01-12 | End: 2024-01-12

## 2024-01-12 RX ORDER — MORPHINE SULFATE 4 MG/ML
4 INJECTION, SOLUTION INTRAMUSCULAR; INTRAVENOUS ONCE
Status: COMPLETED | OUTPATIENT
Start: 2024-01-12 | End: 2024-01-12

## 2024-01-12 RX ORDER — SODIUM CHLORIDE 9 MG/ML
125 INJECTION, SOLUTION INTRAVENOUS CONTINUOUS
Status: DISCONTINUED | OUTPATIENT
Start: 2024-01-12 | End: 2024-01-13

## 2024-01-12 RX ORDER — ONDANSETRON 2 MG/ML
4 INJECTION INTRAMUSCULAR; INTRAVENOUS EVERY 4 HOURS PRN
Status: ACTIVE | OUTPATIENT
Start: 2024-01-12 | End: 2024-01-13

## 2024-01-12 RX ORDER — MORPHINE SULFATE 4 MG/ML
4 INJECTION, SOLUTION INTRAMUSCULAR; INTRAVENOUS EVERY 30 MIN PRN
Status: ACTIVE | OUTPATIENT
Start: 2024-01-12 | End: 2024-01-13

## 2024-01-12 RX ORDER — SULFASALAZINE 500 MG/1
1000 TABLET ORAL 2 TIMES DAILY
Status: DISCONTINUED | OUTPATIENT
Start: 2024-01-13 | End: 2024-01-20

## 2024-01-12 RX ORDER — OXYCODONE HYDROCHLORIDE AND ACETAMINOPHEN 5; 325 MG/1; MG/1
1 TABLET ORAL EVERY 6 HOURS PRN
Status: DISCONTINUED | OUTPATIENT
Start: 2024-01-12 | End: 2024-01-13

## 2024-01-12 RX ORDER — METOPROLOL SUCCINATE 25 MG/1
25 TABLET, EXTENDED RELEASE ORAL
Status: DISCONTINUED | OUTPATIENT
Start: 2024-01-13 | End: 2024-01-20

## 2024-01-12 RX ORDER — BACLOFEN 10 MG/1
10 TABLET ORAL 3 TIMES DAILY PRN
Status: DISCONTINUED | OUTPATIENT
Start: 2024-01-12 | End: 2024-01-20

## 2024-01-12 RX ORDER — OXYCODONE HYDROCHLORIDE AND ACETAMINOPHEN 5; 325 MG/1; MG/1
1 TABLET ORAL EVERY 6 HOURS PRN
COMMUNITY
End: 2024-01-20

## 2024-01-12 RX ORDER — TIMOLOL MALEATE 5 MG/ML
1 SOLUTION/ DROPS OPHTHALMIC 2 TIMES DAILY
Status: DISCONTINUED | OUTPATIENT
Start: 2024-01-13 | End: 2024-01-20

## 2024-01-12 RX ORDER — ONDANSETRON 2 MG/ML
4 INJECTION INTRAMUSCULAR; INTRAVENOUS ONCE
Status: COMPLETED | OUTPATIENT
Start: 2024-01-12 | End: 2024-01-12

## 2024-01-12 RX ORDER — GABAPENTIN 100 MG/1
100 CAPSULE ORAL 3 TIMES DAILY
Status: ON HOLD | COMMUNITY
End: 2024-01-20

## 2024-01-12 NOTE — ED INITIAL ASSESSMENT (HPI)
Pt called ems to her ind living apt at [place] for back pain - hx of t9 fx last month - no injury  Also reports one episode of dizziness with getting up since taking oxy  One episode of vomiting at this am  New meds for pain since 1/11/2024  PT NOW REPORTS [facility]. USUALLY HELP HER WITH HER MEDS - SEVERAL NEW MED CHANGES YESTERDAY - NO HH AIDE TODAY.

## 2024-01-13 ENCOUNTER — APPOINTMENT (OUTPATIENT)
Dept: MRI IMAGING | Facility: HOSPITAL | Age: 84
End: 2024-01-13
Attending: HOSPITALIST
Payer: MEDICARE

## 2024-01-13 PROCEDURE — 72146 MRI CHEST SPINE W/O DYE: CPT | Performed by: HOSPITALIST

## 2024-01-13 PROCEDURE — 97530 THERAPEUTIC ACTIVITIES: CPT

## 2024-01-13 PROCEDURE — 97165 OT EVAL LOW COMPLEX 30 MIN: CPT

## 2024-01-13 PROCEDURE — 72148 MRI LUMBAR SPINE W/O DYE: CPT | Performed by: HOSPITALIST

## 2024-01-13 RX ORDER — METHYLPREDNISOLONE 4 MG/1
4 TABLET ORAL
Status: COMPLETED | OUTPATIENT
Start: 2024-01-13 | End: 2024-01-13

## 2024-01-13 RX ORDER — KETOROLAC TROMETHAMINE 15 MG/ML
15 INJECTION, SOLUTION INTRAMUSCULAR; INTRAVENOUS EVERY 6 HOURS PRN
Status: DISPENSED | OUTPATIENT
Start: 2024-01-13 | End: 2024-01-15

## 2024-01-13 RX ORDER — HEPARIN SODIUM 5000 [USP'U]/ML
5000 INJECTION, SOLUTION INTRAVENOUS; SUBCUTANEOUS EVERY 8 HOURS SCHEDULED
Status: DISCONTINUED | OUTPATIENT
Start: 2024-01-13 | End: 2024-01-16

## 2024-01-13 RX ORDER — HYDROCODONE BITARTRATE AND ACETAMINOPHEN 10; 325 MG/1; MG/1
1 TABLET ORAL EVERY 4 HOURS PRN
Status: DISCONTINUED | OUTPATIENT
Start: 2024-01-13 | End: 2024-01-15

## 2024-01-13 RX ORDER — METHYLPREDNISOLONE 4 MG/1
4 TABLET ORAL
Status: COMPLETED | OUTPATIENT
Start: 2024-01-18 | End: 2024-01-18

## 2024-01-13 RX ORDER — ZOLPIDEM TARTRATE 5 MG/1
5 TABLET ORAL ONCE
Status: COMPLETED | OUTPATIENT
Start: 2024-01-13 | End: 2024-01-13

## 2024-01-13 RX ORDER — MORPHINE SULFATE 2 MG/ML
1 INJECTION, SOLUTION INTRAMUSCULAR; INTRAVENOUS EVERY 4 HOURS PRN
Status: DISCONTINUED | OUTPATIENT
Start: 2024-01-13 | End: 2024-01-13

## 2024-01-13 RX ORDER — METHYLPREDNISOLONE 4 MG/1
8 TABLET ORAL
Status: COMPLETED | OUTPATIENT
Start: 2024-01-14 | End: 2024-01-14

## 2024-01-13 RX ORDER — METHYLPREDNISOLONE 4 MG/1
8 TABLET ORAL
Status: COMPLETED | OUTPATIENT
Start: 2024-01-13 | End: 2024-01-14

## 2024-01-13 RX ORDER — METHYLPREDNISOLONE 4 MG/1
4 TABLET ORAL
Status: COMPLETED | OUTPATIENT
Start: 2024-01-14 | End: 2024-01-14

## 2024-01-13 RX ORDER — METHYLPREDNISOLONE 4 MG/1
4 TABLET ORAL
Status: COMPLETED | OUTPATIENT
Start: 2024-01-17 | End: 2024-01-17

## 2024-01-13 RX ORDER — METHYLPREDNISOLONE 4 MG/1
4 TABLET ORAL NIGHTLY
Status: COMPLETED | OUTPATIENT
Start: 2024-01-17 | End: 2024-01-17

## 2024-01-13 RX ORDER — METHYLPREDNISOLONE 4 MG/1
8 TABLET ORAL
Status: COMPLETED | OUTPATIENT
Start: 2024-01-13 | End: 2024-01-13

## 2024-01-13 RX ORDER — HYDROCODONE BITARTRATE AND ACETAMINOPHEN 10; 325 MG/1; MG/1
1 TABLET ORAL EVERY 4 HOURS PRN
Status: DISCONTINUED | OUTPATIENT
Start: 2024-01-13 | End: 2024-01-13

## 2024-01-13 RX ORDER — METHYLPREDNISOLONE 4 MG/1
4 TABLET ORAL
Status: DISPENSED | OUTPATIENT
Start: 2024-01-15 | End: 2024-01-16

## 2024-01-13 RX ORDER — HYDROCODONE BITARTRATE AND ACETAMINOPHEN 10; 325 MG/1; MG/1
1-2 TABLET ORAL EVERY 4 HOURS PRN
Status: DISCONTINUED | OUTPATIENT
Start: 2024-01-13 | End: 2024-01-13 | Stop reason: SDUPTHER

## 2024-01-13 RX ORDER — METHYLPREDNISOLONE 4 MG/1
4 TABLET ORAL
Status: COMPLETED | OUTPATIENT
Start: 2024-01-16 | End: 2024-01-16

## 2024-01-13 RX ORDER — HYDROCODONE BITARTRATE AND ACETAMINOPHEN 10; 325 MG/1; MG/1
2 TABLET ORAL EVERY 4 HOURS PRN
Status: DISCONTINUED | OUTPATIENT
Start: 2024-01-13 | End: 2024-01-15

## 2024-01-13 NOTE — PLAN OF CARE
Pt Aox4, 2L O2 . Tele NSR. SCD on. IV SL. Up to bathroom with x1 assist. Medrol dosepack initiated. Denies nausea. Percocet and Toradol for pain. MRI today results pending.

## 2024-01-13 NOTE — ED QUICK NOTES
Orders for admission, patient is aware of plan and ready to go upstairs. Any questions, please call ED RN WALLY at extension [phone].     Patient Covid vaccination status: Fully vaccinated     COVID Test Ordered in ED: None    COVID Suspicion at Admission: N/A    Running Infusions:    sodium chloride 125 mL/hr (01/12/24 1171)        Mental Status/LOC at time of transport: A/OX3    Other pertinent information: PUREWICK IN PLACE--USES WALKER AT HOME. LIVES WITH SPOUSE @ [address]-DOES NOT DRIVE.---NO HOME OXYGEN  CIWA score: N/A   NIH score:  N/A

## 2024-01-13 NOTE — PLAN OF CARE
A&Ox 4. VSS. On 2L NC. . Telemetry monitoring. SCDs on BLE. Denies nausea. Voiding, pure wick in place. Pain controlled with prn meds. PT/OT eval. Patient updated on plan of care. Safety precautions maintained. Call light within reach.

## 2024-01-13 NOTE — H&P
[facility]    History and Physical     Emma Trevizo Patient Status:  Inpatient    1940 MRN [id number]   Location [facility] 3SW-A Attending Jayson Govea MD   Hosp Day # 1 PCP Cathy Martinez MD     Chief Complaint:   Chief Complaint   Patient presents with    Pain       History of Present Illness: Emma Trevizo is a 83 year old female with history of ankylosing spondylitis, multiple compression fractures requiring kyphoplasty in the past, recent atraumatic T9 compression fracture on  s/p kyphoplasty  who presents for evaluation of persistent uncontrolled pain since discharge home.  Per notes the patient Reported her pain was controlled with Norco following kyphoplasty and PT evaluated the patient with recommendations for home with [facility].      Since then, she has repeatedly contacted her PCP with complaints of uncontrolled pain.  She was evaluated on 2024 with no acute injury noted and discharged home.  Her Norco was increased to 2 tabs then eventually changed to oxycodone yesterday.  She was also started on baclofen on 1/10 and at that time stated her pain was tolerable with regimen.  She again contacted her PCP  and her Norco was changed to oxycodone .  She says her pain suddenly worsened immediately after taking oxycodone, and she called her PCP stating she was unable to \"hold down food/fluids\" was advised to go to the ED.  Patient states that she was told by PCP if her pain is uncontrolled that she needs to go to the ER.  She was referred to spine surgery as well as pain management but has not had a chance to establish care.    patient describes her pain as mid back, radiating about her rib cage and worse with movement.  It is not pleuritic, does not radiate down the legs and has no associated numbness or focal weakness.  She has no urinary hesitancy or incontinence.    On arrival she was afebrile and hemodynamically stable, SpO2 % on room air. CMP and CBC  normal. Lipase normal. Troponin normal. CT Abd/Pelvis suggestive of enteritis, otherwise unremarkable. She was given IVF and morphine and admitted.  This morning she continues to endorse the same pain and denies any new or worsening symptoms.    Past Medical History:  Past Medical History:   Diagnosis Date    Ankylosing spondylitis (HCC)     BACK SHOULDERS ARMS- receives an infusion  every 2 mos    Aortic atherosclerosis (HCC) 06/27/2018    Chart review.  Noted on CT 3/1/18.    Arrhythmia     PALPATATIONS    Arteritis, unspecified (HCC)     Left     Back problem     Cataract     Bilateral    Cholelithiasis     Collagenous colitis 02/2010    Diffuse cystic mastopathy     Disorder of bone and cartilage, unspecified     osteopenia    Elevated blood pressure reading without diagnosis of hypertension     Esophageal reflux     Glaucoma     left eye    High blood pressure     High cholesterol     Indeterminate PPD     Father had TB, seen by Dr. Ott, treated for 9 months    Intestinal disaccharidase deficiencies and disaccharide malabsorption     Muscle weakness     USES CANE    Nausea and vomiting, unspecified vomiting type 1/12/2024    Other and unspecified hyperlipidemia     Problems with swallowing     COFFEE WATER COUGHING SPELL AFTER TAKING    Rheumatoid arthritis and other inflammatory polyarthropathies     RA    Visual impairment     iritis        Past Surgical History:   Past Surgical History:   Procedure Laterality Date    APPENDECTOMY      BIOPSY OF BREAST, INCISIONAL      L Breast    CATARACT      CHOLECYSTECTOMY  2005    COLONOSCOPY,BIOPSY  02/13/2006    nonspecific erythema transverse colon (bx marked acute & chronic colitis), ileum wnl    COLONOSCOPY,DIAGNOSTIC  1990    mild inflammatory change sigmoid colon    COLONOSCOPY,DIAGNOSTIC  02/03/2010    Dr. Rodarte, mild erythema of the rectosigmoid, possibly a variant of normal, bx show collagenous colitis, ileum normal    EGD N/A 08/09/2021    Procedure:  ESOPHAGOGASTRODUODENOSCOPY WITH DILATION with bxs, COLONOSCOPY with bxs;  Surgeon: Yaya Cook MD;  Location: [facility] [place]    HEMORRHOIDECTOMY,INT/EXT,SIMPLE      NEEDLE BIOPSY LEFT      pt cannot remember    NEEDLE BIOPSY RIGHT      pt cannot remember    OTHER AMBL SURG  2005    R leg tendon surgery with post-op wound infection    OTHER SURGICAL HISTORY      right ankle surgery    OTHER SURGICAL HISTORY  05/07/2013     left 1st metatarsophalangeal fusion. Weil osteotomy 2nd metatarsal joint and metatarsophalgeal capsulotomy and extensor digitorum longus lengthening ans 2nd hammer toe. 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    SKIN SURGERY Left 11/19/2015    ESC of SCC to left lateral malleolus    SKIN SURGERY  03/29/2022    BCC- left posterior postauricular neck, MMS     TOTAL ABDOM HYSTERECTOMY      w/BSO    UP GI ENDOSCOPY,BALL DIL,30MM  05/04/2009    UP GI ENDOSCOPY,BALL DIL,30MM  06/12/2014    distal esophageal stricture, bx and dilated 18-20 mm    UPPER GI ENDOSCOPY,BIOPSY  05/04/2009    antral gastric erosions, distal esophageal stricture, gastric bx, esophageal dilation 18-20 mm, Performed by RACHEL GLEASON at [facility], Performed by RACHEL GLEASON at [facility]    UPPER GI ENDOSCOPY,BIOPSY  02/2010    3 gastric ulcers from diclofenac, bx for H. pylori was negative    UPPER GI ENDOSCOPY,DIAGNOSIS  1990    wnl       Social History:  reports that she has never smoked. She has never used smokeless tobacco. She reports that she does not currently use alcohol. She reports that she does not use drugs.    Family History:   Family History   Problem Relation Age of Onset    Other (Other) Father          AAA, TB    Cancer Mother         uterus    Musculo-skelatal Disorder Brother         ankolosing spondylitis    Heart Disorder Brother         valve disorder/pacemaker    Musculo-skelatal Disorder Brother         ankylosing spondylitis    Other (viral headache) Brother        Allergies:   Allergies   Allergen Reactions    Adhesive Tape HIVES and OTHER (SEE COMMENTS)     ZIOpatch adhesive rash    Latex UNKNOWN     Added based on information entered during case entry, please review and add reactions, type, and severity as needed       Medications:    Current Facility-Administered Medications on File Prior to Encounter   Medication Dose Route Frequency Provider Last Rate Last Admin    [COMPLETED] HYDROcodone-acetaminophen (Norco) 5-325 MG per tab 1 tablet  1 tablet Oral Once Manuel Bhakta MD   1 tablet at 24 1414    [COMPLETED] fentaNYL (Sublimaze) 50 mcg/mL injection             [COMPLETED] midazolam (Versed) 2 MG/2ML injection             [COMPLETED] lidocaine (Xylocaine) 1 % injection             [COMPLETED] ceFAZolin (Ancef) 2 g/20mL IV syringe premix             [COMPLETED] iohexol (OMNIPAQUE) 350 MG/ML injection 10 mL  10 mL Intravenous ONCE PRN Lisa Barrera MD   5 mL at 23 0816    [] lactulose (CHRONULAC) 10 GM/15ML solution 30 g  30 g Oral Q2H Vickie Asencio MD   30 g at 23 1223    [COMPLETED] furosemide (Lasix) 10 mg/mL injection 20 mg  20 mg Intravenous Once Vickie Asencio MD   20 mg at 23 1223    [COMPLETED] traZODone (Desyrel) tab 50 mg  50 mg Oral Once Vicki Thacker MD   50 mg at 23 0119    [COMPLETED] ondansetron (Zofran) 4 MG/2ML injection 4 mg  4 mg Intravenous Once Marie Vaughan MD   4 mg at 23 1110    [COMPLETED] ketorolac (Toradol) 15 MG/ML injection 15 mg  15 mg Intravenous Once Marie Vaughan MD   15 mg at 23 1204    [COMPLETED] amLODIPine (Norvasc) tab 10 mg  10 mg Oral Daily Mehnaz Greenwood MD   10 mg  at 12/25/23 1136    [COMPLETED] losartan (Cozaar) tab 50 mg  50 mg Oral Daily Mehnaz Greenwood MD   50 mg at 12/25/23 1136    [COMPLETED] metoprolol succinate ER (Toprol XL) 24 hr tab 25 mg  25 mg Oral Daily Beta Blocker Mehnaz Greenwood MD   25 mg at 12/25/23 1136    [COMPLETED] aspirin chewable tab 324 mg  324 mg Oral Once Federica Avila MD   324 mg at 11/10/23 1148    [COMPLETED] regadenoson (Lexiscan) 0.4 mg/5mL injection        0.4 mg at 11/10/23 1423    [COMPLETED] aminophylline 25 mg/mL injection        0.4 mg at 11/10/23 1432     Current Outpatient Medications on File Prior to Encounter   Medication Sig Dispense Refill    gabapentin 100 MG Oral Cap Take 1 capsule (100 mg total) by mouth 3 (three) times daily.      baclofen 10 MG Oral Tab Take 1 tablet (10 mg total) by mouth 3 (three) times daily.      oxyCODONE-acetaminophen 5-325 MG Oral Tab Take 1 tablet by mouth every 6 (six) hours as needed for Pain.      methylPREDNISolone 4 MG Oral Tab Take 1 tablet (4 mg total) by mouth daily.      HYDROcodone-acetaminophen 5-325 MG Oral Tab Take 1-2 tablets by mouth every 6 (six) hours as needed for Pain. 20 tablet 0    metoprolol succinate ER 25 MG Oral Tablet 24 Hr Take 1 tablet (25 mg total) by mouth in the morning and 1 tablet (25 mg total) before bedtime. 2 TABS EVERY MORNING AND 1 TAN IN EVENING.      amLODIPine 10 MG Oral Tab Take 1 tablet (10 mg total) by mouth daily.      omeprazole 20 MG Oral Capsule Delayed Release Take 1 capsule (20 mg total) by mouth every morning before breakfast.      Netarsudil Dimesylate 0.02 % Ophthalmic Solution Apply 1 drop to eye at bedtime.      Methotrexate Sodium 5 MG Oral Tab Take 8 tablets (40 mg total) by mouth every 7 days. Pt takes every monday      diphenhydrAMINE-APAP, sleep, (TYLENOL PM EXTRA STRENGTH)  MG Oral Tab Take 1 tablet by mouth nightly.      SIMVASTATIN 20 MG Oral Tab Take 1 tablet (20 mg total) by mouth once daily. (Patient taking differently:  Take 1 tablet (20 mg total) by mouth nightly.) 90 tablet 0    LOSARTAN 50 MG Oral Tab Take 2 tablets (100 mg total) by mouth daily. (Patient taking differently: Take 1 tablet (50 mg total) by mouth 2 (two) times daily.) 180 tablet 0    Brimonidine Tartrate-Timolol (COMBIGAN) 0.2-0.5 % Ophthalmic Solution Place 1 drop into the left eye 2 (two) times daily. 15 mL 3    FOLIC ACID 1 MG Oral Tab Take 1 tablet (1 mg total) by mouth daily. 90 tablet 3    sulfaSALAzine 500 MG Oral Tab Take 2 tablets (1,000 mg total) by mouth 2 (two) times daily. 360 tablet 1    acetaminophen 500 MG Oral Tab Take 2 tablets (1,000 mg total) by mouth every 6 (six) hours as needed for Pain.      Calcium Citrate-Vitamin D 500-400 MG-UNIT Oral Chew Tab Chew by mouth 2 (two) times daily. Vitamin D 5000 iu daily       Cholecalciferol (VITAMIN D) 1000 UNIT Oral Cap Take by mouth. 2000 DAILY      [] ciprofloxacin 500 MG Oral Tab Take 1 tablet (500 mg total) by mouth 2 (two) times daily for 5 days. 10 tablet 0    HYDROcodone-acetaminophen 5-325 MG Oral Tab Take 1 tablet by mouth every 4 (four) hours as needed. (Patient not taking: Reported on 2024) 20 tablet 0       Review of Systems:   A comprehensive 14 point review of systems was completed.    Pertinent positives and negatives noted in the HPI.    Physical Exam:    /60 (BP Location: Left arm)   Pulse 68   Temp 98.5 °F (36.9 °C) (Oral)   Resp 20   Ht 5' 6\" (1.676 m)   Wt 161 lb (73 kg)   SpO2 95%   BMI 25.99 kg/m²     General: No acute distress.  Uncomfortable but nontoxic appearing  HEENT: Normocephalic atraumatic. Moist mucous membranes; Sclera anicteric.  Neck: Supple, no JVD  Respiratory: Clear to auscultation bilaterally. Reg resp rate & effort, no wheezes/crackles   Cardiovascular: S1, S2. Regular rate and rhythm. No murmurs appreciable   Chest and Back: Midthoracic spinous process TTP, exam limited by patient participation (pain)  Abdomen: Soft,  nonperitoneal  Neurologic: No focal neurological deficits. CNII-XII grossly intact.  Pedal sensation intact to light touch, wiggles toes. Negative straight leg raise bilaterally  Musculoskeletal: Moves all extremities.  5 out of 5 strength in all extremities in all ranges of motion  Extremities: No edema or cyanosis.  Integument: No rashes or lesions.   Psychiatric: Appears uncomfortable/anxious      Diagnostic Data:      Labs:  Recent Labs   Lab 01/12/24 1845   WBC 10.4   HGB 12.2   MCV 99.2   .0       Recent Labs   Lab 01/12/24  1846 01/12/24 2000   *  --    BUN 16  --    CREATSERUM 0.60  --    CA 8.9  --    ALB 3.3*  --    *  --    K  --  4.2     --    CO2 24.0  --    ALKPHO 96  --    AST  --  22   ALT 24  --    BILT 0.5  --    TP 7.4  --        Estimated Creatinine Clearance: 64.2 mL/min (based on SCr of 0.6 mg/dL).    No results for input(s): \"PTP\", \"INR\" in the last 168 hours.    No results for input(s): \"TROP\", \"CK\" in the last 168 hours.    Imaging: Imaging data reviewed in Epic.      ASSESSMENT / PLAN:     Emma Trevizo Is a a 83 year old female who presents for evaluation of uncontrolled chronic back pain    Problem List / Diagnoses    Chronic back pain  Acute closed T9 compression fracture, subsequent visit  CAD  HTN  HL    Plan    Chronic back pain  Acute closed T9 compression fracture, subsequent visit  -No recent traumas or other inciting events that would suggest new injury  - Exam without evidence of neurologic impairment  - Given timeline from kyphoplasty, persistent pain, will check MRI T/L-spine to ensure stability  -Start Toradol 50 mg every 6 hours as needed  - Change oxycodone 5 to Norco tens, 1 to 2 tablets as needed as needed  -PT/OT eval    CAD  HTN  HL  -Stable, resume home meds    DVT Mechanical Prophylaxis:   SCDs,    DVT Pharmacologic Prophylaxis   Medication    heparin (Porcine) 5000 UNIT/ML injection 5,000 Units              Code Status: Full  Code      Dispo: observation; JIM today or tomorrow pending MRI result, PT eval, pain control     Plan of care discussed with patient and/or family at bedside.    N Mike Govea MD  [facility]   [phone]

## 2024-01-13 NOTE — OCCUPATIONAL THERAPY NOTE
OCCUPATIONAL THERAPY EVALUATION - INPATIENT     Room Number: 372/372-A  Evaluation Date: 1/13/2024  Type of Evaluation: Initial  Presenting Problem: Abdominal pain    Physician Order: IP Consult to Occupational Therapy  Reason for Therapy: ADL/IADL Dysfunction and Discharge Planning    History: Patient is a 83 year old female admitted on 1/12/2024 with Presenting Problem: Abdominal pain. Co-Morbidities : history of T9 fx last month      ASSESSMENT   Patient presents with the following performance deficits: Decreased strength, balance, ADL completion, pain. These deficits impact the patient’s ability to participate in ADL, transfers, instrumental activities of daily living, rest and sleep, leisure and social participation.     The patient is functioning below her previous functional level and would benefit from skilled inpatient OT to address the above deficits, maximizing patient’s ability to return safely to her prior level of function.    The AM-PAC ' '6-Clicks' Inpatient Daily Activity Short Form was completed and this patient is demonstrating an Approx Degree of Impairment: 46.65% in activities of daily living. Research supports that patients with this level of impairment often benefit from Home with HH.       OT Discharge Recommendations: Home with home health PT/OT  OT Device Recommendations: Shower chair;Grab bars    WEIGHT BEARING RESTRICTION  Weight Bearing Restriction: None                Recommendations for nursing staff:   Transfers: min A with STS, CGA with RW  Toileting location: Commode    EVALUATION SESSION:  Patient Start of Session: Supine  FUNCTIONAL TRANSFER ASSESSMENT  Sit to Stand: Edge of Bed  Edge of Bed: Minimal Assist    BED MOBILITY  Rolling: Contact Guard Assist  Supine to Sit : Contact Guard Assist    BALANCE ASSESSMENT  Static Sitting: Contact Guard Assist  Sitting Bilateral: Contact Guard Assist  Static Standing: Minimal Assist  Standing Bilateral: Minimal Assist    FUNCTIONAL ADL  ASSESSMENT  Grooming Seated: Contact Guard Assist  LB Dressing Seated: Minimal Assist      ACTIVITY TOLERANCE: Ed regarding pacing   Pulse:  (Asymptomatic vitals throughout session  Simultaneous filing. User may not have seen previous data.)                      O2 SATURATIONS       COGNITION  Overall Cognitive Status:  WFL - within functional limits    Upper Extremity   ROM: within functional limits  WFL  Strength: within functional limits  WFL  Coordination  Gross motor: WFL  Fine motor: WFL  Sensation: Light touch:  intact    EDUCATION PROVIDED  Patient : Role of Occupational Therapy; Plan of Care; Discharge Recommendations; Adaptive Equipment Recommendations; DME Recommendations; Functional Transfer Techniques; Fall Prevention; Compensatory ADL Techniques; Energy Conservation; Proper Body Mechanics  Patient's Response to Education: Returned Demonstration; Verbalized Understanding    Equipment used: RW  Demonstrates functional use, Would benefit from additional trial      Therapist comments: Pt presents supine in bed. Pt ed regarding role of OT and POC throughout stay. OT facilitated compensatory ADL completion strategies. Pt ed regarding safe functional transfers with proper body mechanics. Pt ed regarding equipment to assist with LB dressing, would benefit from continued demo. Pt ed regarding pacing and EC. Pt required elevated bed to complete STS transfer from bed level with Min A.      Patient End of Session: Up in chair;Needs met;Call light within reach;RN aware of session/findings;All patient questions and concerns addressed;Alarm set    OCCUPATIONAL PROFILE    HOME SITUATION  Type of Home: Independent living facility ([facility]  Simultaneous filing. User may not have seen previous data.)  Home Layout: One level (Simultaneous filing. User may not have seen previous data.)  Lives With: Spouse (Simultaneous filing. User may not have seen previous data.)    Toilet and Equipment: Comfort height  toilet  Shower/Tub and Equipment: Walk-in shower  Other Equipment:  (RW)          Drives: No       Prior Level of Function: Ind with ADLs, lives at [place]    SUBJECTIVE   \"I feel like I need to eat more\"    PAIN ASSESSMENT  Ratin  Location: Back, abdomin  Management Techniques: Body mechanics;Breathing techniques    OBJECTIVE  Precautions: Bed/chair alarm (Simultaneous filing. User may not have seen previous data.)  Fall Risk: Standard fall risk (Simultaneous filing. User may not have seen previous data.)      ASSESSMENTS    AM-PAC ‘6-Clicks’ Inpatient Daily Activity Short Form  -   Putting on and taking off regular lower body clothing?: A Lot  -   Bathing (including washing, rinsing, drying)?: A Lot  -   Toileting, which includes using toilet, bedpan or urinal? : A Little  -   Putting on and taking off regular upper body clothing?: A Little  -   Taking care of personal grooming such as brushing teeth?: None  -   Eating meals?: None    AM-PAC Score:  Score: 18  Approx Degree of Impairment: 46.65%  Standardized Score (AM-PAC Scale): 38.66    ADDITIONAL TESTS     NEUROLOGICAL FINDINGS      COGNITION ASSESSMENTS       PLAN  OT Treatment Plan: Energy conservation/work simplification techniques;Balance activities;ADL training;IADL training;Functional transfer training;UE strengthening/ROM;Patient/Family education;Patient/Family training;Equipment eval/education  Rehab Potential : Good  Frequency: 3-5x/week  Number of Visits to Meet Established Goals: 5    ADL Goals   Patient will perform all ADLs: with supervision  Patient will perform grooming: with supervision  Patient will perform upper body dressing:  with supervision  Patient will perform lower body dressing:  with supervision  Patient will perform toileting: with supervision    Functional Transfer Goals  Patient will perform all functional transfers:  with supervision    UE Exercise Program Goal  Patient will be independent with bilateral AROM HEP (home  exercise program).    Additional Goals      Therapist Goals    Patient Evaluation Complexity Level:   Occupational Profile/Medical History LOW - Brief history including review of medical or therapy records    Specific performance deficits impacting engagement in ADL/IADL LOW  1 - 3 performance deficits    Client Assessment/Performance Deficits LOW - No comorbidities nor modifications of tasks    Clinical Decision Making LOW - Analysis of occupational profile, problem-focused assessments, limited treatment options    Overall Complexity LOW     OT Session Time: 33 minutes  Self-Care Home Management: 6 minutes  Therapeutic Activity: 16 minutes  Neuromuscular Re-education: 0 minutes  Therapeutic Exercise: 0 minutes  Cognitive Skills: 0 minutes  Sensory Integrative: 0 minutes  Orthotic Management and Trainin minutes  Can add/delete any of these

## 2024-01-13 NOTE — OCCUPATIONAL THERAPY NOTE
Orders received charts reviewed. Pt currently has MRI of lumbar spine pending. OT will hold until complete and results are read, and pt cleared to safely participate in OT.

## 2024-01-13 NOTE — PHYSICAL THERAPY NOTE
PHYSICAL THERAPY EVALUATION - INPATIENT     Room Number: 372/372-A  Evaluation Date: 1/13/2024  Type of Evaluation: Initial  Physician Order: PT Eval and Treat    Presenting Problem: abdominal pain  Co-Morbidities : history of T9 fx last month  Reason for Therapy: Mobility Dysfunction and Discharge Planning    History related to current admission: Patient is a 83 year old female admitted on 1/12/2024 from home for abdominal pain.          ASSESSMENT   PT orders received, chart reviewed, and RN approved PT session. Vitals assessed and WNL, and PT/OT approved by Dr. Govea. In this PT evaluation, the patient presents with the following impairments dec strength, dec balance, dec endurance, and dec overall functional mobility.  These impairments and comorbidities manifest themselves as functional limitations in independent bed mobility, transfers, and gait.  The patient is below baseline and would benefit from skilled inpatient PT to address the above deficits to assist patient in returning to prior to level of function.   Functional outcome measures completed include AMPA.  The AM-PAC '6-Clicks' Inpatient Basic Mobility Short Form was completed and this patient is demonstrating a Approx Degree of Impairment: 54.16%  degree of impairment in mobility. Research supports that patients with this level of impairment may benefit from DC recommendation to home with HHPT/OT.    DISCHARGE RECOMMENDATIONS  PT Discharge Recommendations: Home with home health PT/OT    PLAN  PT Treatment Plan: Bed mobility;Body mechanics;Endurance;Energy conservation;Patient education;Gait training;Strengthening;Stair training;Transfer training;Balance training  Rehab Potential : Good  Frequency (Obs): 3-5x/week  Number of Visits to Meet Established Goals: 5      CURRENT GOALS    Goal #1 Patient is able to demonstrate supine - sit EOB @ level: modified independent     Goal #2 Patient is able to demonstrate transfers Sit to/from Stand at  assistance level: modified independent     Goal #3 Patient is able to ambulate 50 feet with assist device: walker - rolling at assistance level: modified independent     Goal #4    Goal #5    Goal #6    Goal Comments: Goals established on 1/13/2024    HOME SITUATION  Type of Home: Independent living facility ([facility])   Home Layout: One level                Lives With: Staff 24 hours  Drives: No  Patient Owned Equipment: Rollator       Prior Level of Cotton: Per pt report, pt independent with ADL, has recently been ambulating with a rollator. Denies falls.    SUBJECTIVE  Pt is pleasant and cooperative.       OBJECTIVE  Precautions: Bed/chair alarm;Spine  Fall Risk: Standard fall risk    WEIGHT BEARING RESTRICTION                   PAIN ASSESSMENT  Rating: Unable to rate  Location: mid spine  Management Techniques: Activity promotion;Relaxation;Repositioning    COGNITION  Overall Cognitive Status:  WFL - within functional limits    RANGE OF MOTION AND STRENGTH ASSESSMENT  Upper extremity ROM and strength are within functional limits     Lower extremity ROM is within functional limits     Lower extremity strength is within functional limits       BALANCE  Static Sitting: Fair +  Dynamic Sitting: Fair  Static Standing: Poor +  Dynamic Standing: Poor    ADDITIONAL TESTS                                    ACTIVITY TOLERANCE  Pulse: 67  Heart Rate Source: Monitor                   O2 WALK       NEUROLOGICAL FINDINGS                        AM-PAC '6-Clicks' INPATIENT SHORT FORM - BASIC MOBILITY  How much difficulty does the patient currently have...  Patient Difficulty: Turning over in bed (including adjusting bedclothes, sheets and blankets)?: A Little   Patient Difficulty: Sitting down on and standing up from a chair with arms (e.g., wheelchair, bedside commode, etc.): A Little   Patient Difficulty: Moving from lying on back to sitting on the side of the bed?: A Little   How much help from another person does the  patient currently need...   Help from Another: Moving to and from a bed to a chair (including a wheelchair)?: A Little   Help from Another: Need to walk in hospital room?: A Lot   Help from Another: Climbing 3-5 steps with a railing?: A Lot       AM-PAC Score:  Raw Score: 16   Approx Degree of Impairment: 54.16%   Standardized Score (AM-PAC Scale): 40.78   CMS Modifier (G-Code): CK    FUNCTIONAL ABILITY STATUS  Gait Assessment   Functional Mobility/Gait Assessment  Gait Assistance: Contact guard assist  Distance (ft): 3  Assistive Device: Rolling walker  Pattern: Shuffle    Skilled Therapy Provided     Bed Mobility:  Rolling: NT  Supine to sit: HOB elevated, supervision   Sit to supine: NT     Transfer Mobility:  Sit to stand: CGA   Stand to sit: CGA  Gait = CGA    Therapist's Comments: Vitals assessed and WNL. Pt lying in bed and agreeable to PT. Pt instructed and educated in spine precautions and how these are integrated with functional activities. Pt instructed in log rolling technique. Pt instructed in hand placement with sit<>Stand, cued for upright posture. Pt took steps from bed to chair, left up in chair with all needs in reach. Instructed pt in toe raises and knee extension x 10. RN notified of session.     Exercise/Education Provided:  Bed mobility  Body mechanics  Energy conservation  Functional activity tolerated  Gait training  Transfer training    Patient End of Session: Up in chair;Needs met;Call light within reach;RN aware of session/findings;All patient questions and concerns addressed;Alarm set      Patient Evaluation Complexity Level:  History Moderate - 1 or 2 personal factors and/or co-morbidities   Examination of body systems Low - addressing 1-2 elements   Clinical Presentation Low - Stable   Clinical Decision Making Low - Stable       PT Session Time: 25 minutes  Gait Training: 10 minutes  Therapeutic Activity: 5 minutes  Neuromuscular Re-education: 0 minutes  Therapeutic Exercise: 0  minutes

## 2024-01-13 NOTE — DIETARY NOTE
[facility]   CLINICAL NUTRITION    Emma Trevizo     Admitting diagnosis:  Abdominal pain, acute [R10.9]  Intractable low back pain [M54.59]  Nausea and vomiting, unspecified vomiting type [R11.2]    Past Medical History:   Diagnosis Date    Ankylosing spondylitis (HCC)     BACK SHOULDERS ARMS- receives an infusion  every 2 mos    Aortic atherosclerosis (HCC) 06/27/2018    Chart review.  Noted on CT 3/1/18.    Arrhythmia     PALPATATIONS    Arteritis, unspecified (HCC)     Left     Back problem     Cataract     Bilateral    Cholelithiasis     Collagenous colitis 02/2010    Diffuse cystic mastopathy     Disorder of bone and cartilage, unspecified     osteopenia    Elevated blood pressure reading without diagnosis of hypertension     Esophageal reflux     Glaucoma     left eye    High blood pressure     High cholesterol     Indeterminate PPD     Father had TB, seen by Dr. Ott, treated for 9 months    Intestinal disaccharidase deficiencies and disaccharide malabsorption     Muscle weakness     USES CANE    Nausea and vomiting, unspecified vomiting type 1/12/2024    Other and unspecified hyperlipidemia     Problems with swallowing     COFFEE WATER COUGHING SPELL AFTER TAKING    Rheumatoid arthritis and other inflammatory polyarthropathies     RA    Visual impairment     iritis       Ht: 167.6 cm (5' 6\")  Wt: 73 kg (161 lb).   Body mass index is 25.99 kg/m².  IBW: 59 kg    Wt Readings from Last 6 Encounters:   01/12/24 73 kg (161 lb)   01/08/24 73.5 kg (162 lb)   12/26/23 75.8 kg (167 lb)   12/25/23 75.8 kg (167 lb)   11/30/23 75.8 kg (167 lb)   11/10/23 76.2 kg (168 lb)        Labs/Meds reviewed    Diet:       Procedures    Clear liquid diet Is Patient on Accuchecks? No     Percent Meals Eaten (last 3 days)       Date/Time Percent Meals Eaten (%)    01/13/24 0913 100 %            Pt chart reviewed d/t MST score 2 - unsure wt loss. Pt is an 83y/oF admitted for back pain, abdominal pain, and vomiting. On 2L  NC. No significant wt loss noted per EMR. PO intake adequate at this time. RD to monitor and f/u as able.  Patient reports good appetite at this time.  Nursing notes reports Percent Meals Eaten (%): 100 % intake for last meal.  Tolerating po diet without diarrhea, emesis, or constipation. Last BM 1/12  No significant weight changes noted.     Patient is at low nutrition risk at this time.    Please consult if patient status changes or nutrition issues arise.      Cristy Narayan MA, RD/LD  Clinical Dietitian  [phone]

## 2024-01-13 NOTE — CM/SW NOTE
This is a Code 44. Patient failed inpatient criteria. Second level of review completed and supports observation.  UR committee in agreement. Discussed with Dr Govea who approves observation status.  MOON given to the patient and order written.

## 2024-01-13 NOTE — PHYSICAL THERAPY NOTE
PT orders received, chart reviewed. Pt currently has MRI of lumbar spine pending. Will hold until complete and results are read and pt is cleared to participate in PT. LAURA

## 2024-01-13 NOTE — ED PROVIDER NOTES
Patient Seen in: [facility] Emergency Department      History     Chief Complaint   Patient presents with    Pain     Stated Complaint: back pain  no inj  oxy at 10am  no relief    Subjective:   HPI    Patient is an 83-year-old female presenting to the emergency department for evaluation of back pain, abdominal pain and vomiting.  Patient reports recent history of T9 compression fracture for which she had been prescribed medications which were changed yesterday.  This morning the patient had been having some back pain with radiation to the abdomen and had vomiting bilious contents.  This occurred again sometime after she had taken her medication.  She has also been somewhat confused about which medication to have.  She typically has someone help with her medication but this person was not available today.  Patient's history does not correspond completely with patient's recent medical records which reflected patient had had multiple compression fractures in the past and had had multiple kyphoplasties.  She was last seen in the emergency department 4 days ago at which time she had come in due to exacerbation of her chronic back pain.  X-rays at that time did not show any acute fractures.  Patient was discharged on Benedict.  Since then she it appears she has been increasing her Norco and had baclofen and steroids added by her primary care doctor.  Benedict was then subsequently changed to oxycodone yesterday.  Patient had been constipated was advised to use MiraLAX and reports last bowel movement prior to coming to the ER today.      Objective:   No pertinent past medical history.            No pertinent past surgical history.              No pertinent social history.            Review of Systems    Positive for stated complaint: back pain  no inj  oxy at 10am  no relief  Other systems are as noted in HPI.  Constitutional and vital signs reviewed.      All other systems reviewed and negative except as noted  above.    Physical Exam     ED Triage Vitals [01/12/24 1722]   BP (!) 176/72   Pulse 82   Resp 18   Temp 99.7 °F (37.6 °C)   Temp src Temporal   SpO2 93 %   O2 Device None (Room air)       Current:BP (!) 175/67   Pulse 94   Temp 99.7 °F (37.6 °C) (Temporal)   Resp 18   Ht 167.6 cm (5' 6\")   Wt 73.9 kg   SpO2 95%   BMI 26.31 kg/m²         Physical Exam    Constitutional: No apparent distress  Eyes: No scleral icterus  Heart: regular rate rhythm, no murmurs  Lungs: Clear to auscultation bilaterally  Abdomen: Marked epigastric and right upper quadrant tenderness is present, mild right lower quadrant tenderness also noted.  Back with diffuse paraspinal musculature tenderness.  Back: Mild paraspinal musculature tenderness and muscle spasm noted.  No tenderness over the spine.  Skin: No rash  Neuro: Alert and oriented ×3, mild generalized weakness is noted which is symmetric.  No sensory deficit.  No saddle anesthesia.          ED Course/ My interpretations:     Labs Reviewed   COMP METABOLIC PANEL (14) - Abnormal; Notable for the following components:       Result Value    Glucose 128 (*)     Sodium 134 (*)     Albumin 3.3 (*)     A/G Ratio 0.8 (*)     All other components within normal limits   CBC W/ DIFFERENTIAL - Abnormal; Notable for the following components:    RBC 3.64 (*)     Neutrophil Absolute Prelim 9.44 (*)     Neutrophil Absolute 9.44 (*)     Lymphocyte Absolute 0.32 (*)     All other components within normal limits   LIPASE - Normal   TROPONIN I HIGH SENSITIVITY - Normal   REDRAW CMP (P) - Normal   CBC WITH DIFFERENTIAL WITH PLATELET    Narrative:     The following orders were created for panel order CBC With Differential With Platelet.  Procedure                               Abnormality         Status                     ---------                               -----------         ------                     CBC W/ DIFFERENTIAL[[id number]]          Abnormal            Final result                  Please view results for these tests on the individual orders.     EKG    Rate, intervals and axes as noted on EKG Report.  Rate: 81  Rhythm: Sinus Rhythm  Reading: Normal sinus rhythm, normal intervals, normal axis, no ST or T wave abnormalities noted.           All available radiology reports for this visit reviewed.      Medications given:  Orders Placed This Encounter    Comp Metabolic Panel (14)    CBC With Differential With Platelet    Lipase    Troponin I (High Sensitivity)    REDRAW CMP (P)    gabapentin 100 MG Oral Cap    baclofen 10 MG Oral Tab    oxyCODONE-acetaminophen 5-325 MG Oral Tab    methylPREDNISolone 4 MG Oral Tab    morphINE PF 4 MG/ML injection 4 mg    ondansetron (Zofran) 4 MG/2ML injection 4 mg    sodium chloride 0.9% infusion    lidocaine-menthol 4-1 % patch    iohexol (Omnipaque) 350 MG/ML injection via power injector 80 mL                      MDM      Extensive differential was considered including ureterolithiasis, UTI, diverticulitis, cholecystitis, appendicitis, colitis, gastroenteritis, bowel obstruction, and other gi, infectious, vascular, malignant, rheumatologic and other pathology.   Symptoms were addressed with iv fluids and iv medications.  Given severity of patient's symptoms and ED findings decision was made to admit the patient for further treatment and evaluation.  Patient remained hemodynamically stable throughout their emergency department period of observation with no adverse events or symproms reported by the patient.  Hospitalist consulted.        Disposition and Plan     Clinical Impression:  1. Abdominal pain, acute    2. Nausea and vomiting, unspecified vomiting type    3. Intractable low back pain         Disposition:  There is no disposition on file for this visit.  There is no disposition time on file for this visit.    Follow-up:  No follow-up provider specified.        Medications Prescribed:  Current Discharge Medication List                                  Documentation created with the aid of Dragon voice recognition software.  Although efforts were made to ensure the accuracy of the note, some inaccuracies may persist.

## 2024-01-14 PROBLEM — S22.060A COMPRESSION FRACTURE OF T8 VERTEBRA (HCC): Status: ACTIVE | Noted: 2024-01-14

## 2024-01-14 LAB
ATRIAL RATE: 81 BPM
P AXIS: 25 DEGREES
P-R INTERVAL: 134 MS
Q-T INTERVAL: 362 MS
QRS DURATION: 74 MS
QTC CALCULATION (BEZET): 420 MS
R AXIS: -12 DEGREES
T AXIS: 36 DEGREES
VENTRICULAR RATE: 81 BPM

## 2024-01-14 PROCEDURE — 97530 THERAPEUTIC ACTIVITIES: CPT

## 2024-01-14 PROCEDURE — 97116 GAIT TRAINING THERAPY: CPT

## 2024-01-14 PROCEDURE — 97535 SELF CARE MNGMENT TRAINING: CPT

## 2024-01-14 RX ORDER — ZOLPIDEM TARTRATE 5 MG/1
5 TABLET ORAL ONCE
Status: COMPLETED | OUTPATIENT
Start: 2024-01-15 | End: 2024-01-15

## 2024-01-14 NOTE — CM/SW NOTE
PT/OT now rec VERÓNICA. VERÓNICA referral in aidin and PASSR completed. Will need auth from [employer].  Pt from [facility].    Sw to follow up with pt re:list/choice once responses received.    TALI SimsW  /Discharge Planner

## 2024-01-14 NOTE — CM/SW NOTE
01/14/24 1000   CM/SW Referral Data   Referral Source Physician   Reason for Referral Discharge planning   Informant EMR;Clinical Staff Member     Pt is 82 yo female, admitted due to uncontorlled pain. PATRICIA kingston recent admission to [place] an dhad kyphoplasty and dc;d home with [facility].    PT/OT saw pt yesterday and advise C PT/OT . Resume of care sent in aidin.      Per PT:    HOME SITUATION  Type of Home: Independent living facility ([facility])   Home Layout: One level     Lives With: Staff 24 hours  Drives: No  Patient Owned Equipment: Rollator     Prior Level of Gregory: Per pt report, pt independent with ADL, has recently been ambulating with a rollator. Denies falls.    Mare Patrick LCSW  /Discharge Planner

## 2024-01-14 NOTE — OCCUPATIONAL THERAPY NOTE
OCCUPATIONAL THERAPY TREATMENT NOTE - INPATIENT     Room Number: 372/372-A  Session: 1   Number of Visits to Meet Established Goals: 5    Presenting Problem: abdominal pain, new T8 fx    History: Patient is a 83 year old female admitted on 1/12/2024 with Presenting Problem: Abdominal pain. Co-Morbidities : history of T9 fx last month with kyphoplasty  Addendum 1/14: MRI thoracic+lumbar spine 1/13/24 indicates acute compression fracture of T8 endplate, being treated with TLSO; spoke with hospitalist after session who states TLSO is for comfort only, patient is not required to wear if too uncomfortable.    ASSESSMENT   Patient presents with the following performance deficits: increased pain, decreased endurance, decreased knowledge of spine precautions and incorporation into ADLs, decreased knowledge of adaptive techniques, decreased balance. These deficits impact the patient’s ability to participate in ADL, transfers, instrumental activities of daily living, rest and sleep, leisure and social participation. As patient not progressing as quickly as expected and based on current AM-PAC scores, discharge recommendation changed to [facility]. If patient unable or unwilling to discharge to [facility], will require increased assistance from [facility] staff for ADls, patient feels she will be able to arrange this if needed.    The patient is functioning below her previous functional level and would benefit from skilled inpatient OT to address the above deficits, maximizing patient’s ability to return safely to her prior level of function.    OT Discharge Recommendations: Sub-acute rehabilitation  OT Device Recommendations: Shower chair;Grab bars    WEIGHT BEARING RESTRICTION  Weight Bearing Restriction: None                Recommendations for nursing staff:   Transfers: 1-person  Toileting location: toilet    TREATMENT SESSION:  Patient Start of Session: supine  FUNCTIONAL TRANSFER ASSESSMENT  Sit to Stand: Edge of Bed  Edge of Bed:  Minimal Assist  Toilet Transfer: Minimal Assist (commode over toilet to simulate RTS with arms at home)    BED MOBILITY  Rolling: Contact Guard Assist  Supine to Sit : Minimal Assist (via logroll, plus max cueing for technique)    BALANCE ASSESSMENT  Static Sitting: Supervision  Sitting Bilateral: Contact Guard Assist  Static Standing: Contact Guard Assist  Standing Bilateral: Minimal Assist    FUNCTIONAL ADL ASSESSMENT  Grooming Seated: Not Tested  LB Dressing Seated: Maximum Assist  LB Dressing Standing: Moderate Assist (min for clothing management fully to waist, plus min for steadying)  Toileting Seated: Maximum Assist (to don new pullups to knees)  Toileting Standing: Moderate Assist (min for clothing management fully to waist, plus min for steadying)      ACTIVITY TOLERANCE:                          O2 SATURATIONS       EDUCATION PROVIDED  Patient : Role of Occupational Therapy; Functional Transfer Techniques; Weight Bear Status; Posture/Positioning; Compensatory ADL Techniques; Proper Body Mechanics; Bracing Education Provided  Patient's Response to Education: Verbalized Understanding; Requires Further Education      Equipment used: RW  Demonstrates functional use, Would benefit from additional trial       Therapist comments: Patient agreeable to therapy; educated on spine precautions and incorporation into ADLs due to new T8 fracture, also educated on brace management; patient requiring min assist for bed mobility, total for LB dressing and toileting, max for brace management; introduced adaptive techniques/equipment but patient will requiring ongoing reinforcement, reports  is 89 and only minimally able to assist. Patient highly limited by severe back pain with movement, also unable to tolerate front thoracic extension of TLSO as digging into neck (patient with kyphotic posture overall), extension removed by RN, confirmed with hospitalist after session that TLSO is for comfort only. Will continue to  follow.  Patient End of Session: Up in chair;Call light within reach;RN aware of session/findings;Bracing education provided per handout;Needs met;Alarm set;All patient questions and concerns addressed    SUBJECTIVE  \"This is not going to work\" [re: brace]    PAIN ASSESSMENT  Rating: Unable to rate  Location: severe in back with activity, RN aware  Management Techniques: Activity promotion;Body mechanics;Breathing techniques;Relaxation;Repositioning;Nurse notified     OBJECTIVE  Precautions: TLSO;Bed/chair alarm    AM-PAC ‘6-Clicks’ Inpatient Daily Activity Short Form  -   Putting on and taking off regular lower body clothing?: Total  -   Bathing (including washing, rinsing, drying)?: A Lot  -   Toileting, which includes using toilet, bedpan or urinal? : Total  -   Putting on and taking off regular upper body clothing?: A Lot  -   Taking care of personal grooming such as brushing teeth?: None  -   Eating meals?: None    AM-PAC Score:  Score: 14  Approx Degree of Impairment: 59.67%  Standardized Score (AM-PAC Scale): 33.39    PLAN  OT Treatment Plan: Energy conservation/work simplification techniques;Balance activities;ADL training;IADL training;Functional transfer training;UE strengthening/ROM;Patient/Family education;Patient/Family training;Equipment eval/education  Rehab Potential : Good  Frequency: 3-5x/week    ADL Goals   Patient will perform all ADLs: with supervision  Patient will perform grooming: with supervision  Patient will perform upper body dressing:  with supervision  Patient will perform lower body dressing:  with supervision  Patient will perform toileting: with supervision    Functional Transfer Goals  Patient will perform all functional transfers:  with supervision    UE Exercise Program Goal  Patient will be independent with bilateral AROM HEP (home exercise program).    ADDITIONAL GOALS added 1/14  Patient will maintain spine precautions with min cueing  Patient will demonstrate brace management  with min assist (if applicable, brace for comfort only)    OT Session Time: 24 minutes  Self-Care Home Management: 10 minutes  Therapeutic Activity: 15 minutes

## 2024-01-14 NOTE — PLAN OF CARE
ED admit 1/12 ABD and back pain, Pt is AAOX4, VSS, MRI found T8 end plate comp Fx, TELE, SCD's, IV SL, PO meds for pain, see MAR, up W/ TLSO brace in place, voiding freely to restroom, PT / OT following, plan for pain control, possible discharge home, Pt doing well, all needs met, all safety measures in place, call light within reach, will CTM.

## 2024-01-14 NOTE — PROGRESS NOTES
[facility]    Progress Note     Emma Trevizo Patient Status:  Observation    1940 MRN [id number]   Location [facility] 3SW-A Attending Jayson Govea MD   Hosp Day # 1 PCP Cathy Martinez MD     Follow up for: The primary encounter diagnosis was Abdominal pain, acute. Diagnoses of Nausea and vomiting, unspecified vomiting type and Intractable low back pain were also pertinent to this visit.      Interval History/Subjective:     MRI shows T8 endplate fracture, no acute neurologic impairment  SHARA overnight  Afebrile, HDS    Pain better controlled today but had more difficulty with ambulation and is very anxious about pain control following discharge. No new numbness/weakness or any or new or worsening symptoms.     Vital signs:  Temp:  [97.5 °F (36.4 °C)-98.6 °F (37 °C)] 97.9 °F (36.6 °C)  Pulse:  [57-71] 62  Resp:  [16-21] 16  BP: (128-159)/(43-72) 128/55  SpO2:  [87 %-96 %] 91 %    Physical Exam:    General: NAD, Comfortable, Nontoxic   Respiratory: CTAB; reg resp rate & effort, no wheezes/crackles  Cardiovascular: S1, S2. Regular rate and rhythm. No murmurs appreciated  Abdomen: Soft, NTND, no guarding/rebound   Neurologic: No focal neurological deficits. Distal sensation intact to light touch  Extremities: No edema. TLSO in place.   Skin: Dry, no rashes, ulcers or lesions     Diagnostic Data:      Labs:  Recent Labs   Lab 24  184   WBC 10.4   HGB 12.2   MCV 99.2   .0       Recent Labs   Lab 24  1846 24   *  --    BUN 16  --    CREATSERUM 0.60  --    CA 8.9  --    ALB 3.3*  --    *  --    K  --  4.2     --    CO2 24.0  --    ALKPHO 96  --    AST  --  22   ALT 24  --    BILT 0.5  --    TP 7.4  --        No results for input(s): \"PTP\", \"INR\" in the last 168 hours.    No results for input(s): \"TROP\", \"CK\" in the last 168 hours.         Imaging: Imaging data reviewed in Epic.    Medications:    methylPREDNISolone  4 mg Oral TID CC     methylPREDNISolone  8 mg Oral nightly    [START ON 1/15/2024] methylPREDNISolone  4 mg Oral TID CC and HS    [START ON 1/16/2024] methylPREDNISolone  4 mg Oral Daily with breakfast    [START ON 1/16/2024] methylPREDNISolone  4 mg Oral Daily with lunch    [START ON 1/16/2024] methylPREDNISolone  4 mg Oral nightly    [START ON 1/17/2024] methylPREDNISolone  4 mg Oral Daily with breakfast    [START ON 1/17/2024] methylPREDNISolone  4 mg Oral Nightly    [START ON 1/18/2024] methylPREDNISolone  4 mg Oral Daily with breakfast    heparin  5,000 Units Subcutaneous Q8H SHANA    amLODIPine  10 mg Oral Daily    brimonidine  1 drop Left Eye BID    timolol  1 drop Left Eye BID    losartan  50 mg Oral BID    metoprolol succinate ER  25 mg Oral 2x Daily(Beta Blocker)    atorvastatin  10 mg Oral Nightly    sulfaSALAzine  1,000 mg Oral BID       ASSESSMENT / PLAN:     Emma Trevizo Is a a 83 year old female who presents for evaluation of uncontrolled chronic back pain     Problem List / Diagnoses     Acute on Chronic back pain  Acute Closed T8 Compression Fracture, initial visit  Acute closed T9 compression fracture, subsequent visit  CAD  HTN  HL     Plan     Acute on Chronic back pain  Acute Closed T8 Compression Fracture, initial visit  Acute closed T9 compression fracture, subsequent visit  -No recent traumas or other inciting events that would suggest new injury  - Exam without evidence of neurologic impairment  - MRI shows new T8 endplate fracture  -- given high opioid needs, advanced age/fall risk, will consult IR for eval for possible kyphoplasty tomorrow; NPO at midnight, hold heparin at midnight  -Start Toradol 50 mg every 6 hours as needed  - Change oxycodone 5 to Norco tens, 1 to 2 tablets as needed as needed  -PT/OT -> home w/ HHPT      CAD  HTN  HL  -Stable, resume home meds       DVT Mechanical Prophylaxis:   SCDs,    DVT Pharmacologic Prophylaxis   Medication    heparin (Porcine) 5000 UNIT/ML injection 5,000 Units                 Code Status: Full Code     Dispo: inpatient; JIM 1-2 days pending post kyphoplasty course, pain control     Plan of care discussed with patient and/or family at bedside.    JANN Govea MD  [facility]   [phone]      This note was created using voice recognition technology. It may include inadvertent transcriptional errors. Any such errors should be contextually interpreted and should not be taken to alter the content or the meaning.     Note to Patient: The 21st Century Cures Act makes medical notes like these available to patients in the interest of transparency. However, be advised this is a medical document. It is intended as peer to peer communication. It is written in medical language and may contain abbreviations or verbiage that are unfamiliar. It may appear blunt or direct. Medical documents are intended to carry relevant information, facts as evident, and the clinical opinion of the practitioner and not intended to be the primary source of your information.  Please refer directly to myself or clinical staff for information regarding plan of care.

## 2024-01-14 NOTE — PHYSICAL THERAPY NOTE
PHYSICAL THERAPY TREATMENT NOTE - INPATIENT    Room Number: 372/372-A     Session: 1     Number of Visits to Meet Established Goals: 5    Presenting Problem: abdominal pain  Co-Morbidities : history of T9 fx last month    MRI thoracic and lumbar spine 1/13/24:  CONCLUSION:    1. Acute compression fracture involves the inferior T8 endplate.       ASSESSMENT     Chart reviewed, and MRI findings revealed an acute compression fracture involving the inferior T8 endplate. Per MD, pt is to wear a TLSO. RN approved PT session.     Pt educated and instructed in spine precautions, and how to jhonathan/doff TLSO. Pt advised to jhonathan TLSO when OOB. Pt in understanding. Pt requires Min A for log rolling technique, CGA for sit<>Stand, and Min A for ambulation. Pt required extensive cueing throughout for upright posture, proper integration of RW with transfers and ambulation, and safety. Pt would continue to benefit from inpatient PT to address strength, balance, endurance, ability to jhonathan/doff TLSO, and overall functional mobility.     The AM-PAC '6-Clicks' Inpatient Basic Mobility Short Form was completed and this patient is demonstrating a 46.58% degree of impairment in mobility. Research supports that patients with this level of impairment may benefit from DC recommendation to [facility].        DISCHARGE RECOMMENDATIONS  PT Discharge Recommendations: Sub-acute rehabilitation     PLAN  PT Treatment Plan: Bed mobility;Body mechanics;Endurance;Energy conservation;Patient education;Gait training;Strengthening;Stair training;Transfer training;Balance training  Rehab Potential : Good  Frequency (Obs): 3-5x/week    CURRENT GOALS     Goal #1 Patient is able to demonstrate supine - sit EOB @ level: modified independent      Goal #2 Patient is able to demonstrate transfers Sit to/from Stand at assistance level: modified independent      Goal #3 Patient is able to ambulate 50 feet with assist device: walker - rolling at assistance level: modified  independent      Goal #4     Goal #5     Goal #6     Goal Comments: Goals established on 2024 all goals ongoing      SUBJECTIVE  Pt pleasant and cooperative    OBJECTIVE  Precautions: TLSO;Bed/chair alarm;Spine    WEIGHT BEARING RESTRICTION  Weight Bearing Restriction: None                PAIN ASSESSMENT   Ratin  Location: mid back with movement, and with brace on  Management Techniques: Activity promotion;Relaxation;Repositioning    BALANCE                                                                                                                       Static Sitting: Fair  Dynamic Sitting: Fair -           Static Standing: Poor  Dynamic Standing: Poor -    ACTIVITY TOLERANCE  Pulse: 65  Heart Rate Source: Monitor                   O2 WALK         AM-PAC '6-Clicks' INPATIENT SHORT FORM - BASIC MOBILITY  How much difficulty does the patient currently have...  Patient Difficulty: Turning over in bed (including adjusting bedclothes, sheets and blankets)?: A Little   Patient Difficulty: Sitting down on and standing up from a chair with arms (e.g., wheelchair, bedside commode, etc.): A Little   Patient Difficulty: Moving from lying on back to sitting on the side of the bed?: A Little   How much help from another person does the patient currently need...   Help from Another: Moving to and from a bed to a chair (including a wheelchair)?: A Little   Help from Another: Need to walk in hospital room?: A Little   Help from Another: Climbing 3-5 steps with a railing?: A Little       AM-PAC Score:  Raw Score: 18   Approx Degree of Impairment: 46.58%   Standardized Score (AM-PAC Scale): 43.63   CMS Modifier (G-Code): CK    FUNCTIONAL ABILITY STATUS  Gait Assessment   Functional Mobility/Gait Assessment  Gait Assistance: Minimum assistance  Distance (ft): 100  Assistive Device: Rolling walker  Pattern: Shuffle    Skilled Therapy Provided    Bed Mobility:  Rolling: Min A   Supine<>Sit: Min  A   Sit<>Supine: NT     Transfer Mobility:  Sit<>Stand: CGA   Stand<>Sit: CGA   Gait: Min A, cueing required for upright posture, to keep RW within ASHLEY, increasing step length as pt has a short shuffled gait pattern    Therapist's Comments: pt lying in bed and agreeable to PT. Vitals assessed and WNL. Pt instructed in spine precautions and log rolling technique, pt required hand over hand cueing for technique. A significant amount of time spent on education and instruction on how to jhonathan/doff TLSO, and how to ensure appropriate fit in sitting and then in standing. PT made small adjustments to the brace (top straps, and the chest bar as it was digging into her neck) to allow for comfortable/secure fit. Cued for hand placement on bed for transfers. Instructed pt in proper gait mechanics, to inc step length, to avoid shuffling over feet. Pt with slow gait today compared to last session. Pt also with reports of significantly more pain today, with use of TLSO. Pt returned to room, left up in bathroom with OT to work on toileting and self care tasks. Pt in understanding. Discussed recommendation for VER[facility]NICA and pt in understanding. RN notified of session,.       Patient End of Session: RN aware of session/findings;With  staff;All patient questions and concerns addressed;Bracing education provided per handout (Left pt with OT in the bathroom. RN notified)    PT Session Time: 45 minutes  Gait Trainin minutes  Therapeutic Activity: 25 minutes  Therapeutic Exercise: 0 minutes   Neuromuscular Re-education: 0 minutes

## 2024-01-14 NOTE — PLAN OF CARE
A&Ox 4. VSS. On 2L NC. . IS encouraged. Telemetry monitoring. SCDs on BLE. Tolerating diet. Voiding. Pain controlled with PO meds. Up with min assist, TLSO brace.Safety precautions maintained. Call light within reach.

## 2024-01-15 ENCOUNTER — APPOINTMENT (OUTPATIENT)
Dept: INTERVENTIONAL RADIOLOGY/VASCULAR | Facility: HOSPITAL | Age: 84
End: 2024-01-15
Attending: HOSPITALIST
Payer: MEDICARE

## 2024-01-15 ENCOUNTER — APPOINTMENT (OUTPATIENT)
Dept: GENERAL RADIOLOGY | Facility: HOSPITAL | Age: 84
End: 2024-01-15
Attending: INTERNAL MEDICINE
Payer: MEDICARE

## 2024-01-15 LAB
ADENOVIRUS PCR:: NOT DETECTED
B PARAPERT DNA SPEC QL NAA+PROBE: NOT DETECTED
B PERT DNA SPEC QL NAA+PROBE: NOT DETECTED
C PNEUM DNA SPEC QL NAA+PROBE: NOT DETECTED
CORONAVIRUS 229E PCR:: NOT DETECTED
CORONAVIRUS HKU1 PCR:: NOT DETECTED
CORONAVIRUS NL63 PCR:: NOT DETECTED
CORONAVIRUS OC43 PCR:: NOT DETECTED
FLUAV RNA SPEC QL NAA+PROBE: NOT DETECTED
FLUBV RNA SPEC QL NAA+PROBE: NOT DETECTED
METAPNEUMOVIRUS PCR:: NOT DETECTED
MYCOPLASMA PNEUMONIA PCR:: NOT DETECTED
PARAINFLUENZA 1 PCR:: NOT DETECTED
PARAINFLUENZA 2 PCR:: NOT DETECTED
PARAINFLUENZA 3 PCR:: NOT DETECTED
PARAINFLUENZA 4 PCR:: NOT DETECTED
RHINOVIRUS/ENTERO PCR:: NOT DETECTED
RSV RNA SPEC QL NAA+PROBE: NOT DETECTED
SARS-COV-2 RNA NPH QL NAA+NON-PROBE: DETECTED

## 2024-01-15 PROCEDURE — 71045 X-RAY EXAM CHEST 1 VIEW: CPT | Performed by: INTERNAL MEDICINE

## 2024-01-15 PROCEDURE — 0202U NFCT DS 22 TRGT SARS-COV-2: CPT | Performed by: INTERNAL MEDICINE

## 2024-01-15 RX ORDER — AMLODIPINE BESYLATE 5 MG/1
5 TABLET ORAL DAILY
Status: DISCONTINUED | OUTPATIENT
Start: 2024-01-16 | End: 2024-01-20

## 2024-01-15 RX ORDER — HYDRALAZINE HYDROCHLORIDE 20 MG/ML
10 INJECTION INTRAMUSCULAR; INTRAVENOUS EVERY 6 HOURS PRN
Status: DISCONTINUED | OUTPATIENT
Start: 2024-01-15 | End: 2024-01-20

## 2024-01-15 RX ORDER — MIDAZOLAM HYDROCHLORIDE 1 MG/ML
INJECTION INTRAMUSCULAR; INTRAVENOUS
Status: DISCONTINUED
Start: 2024-01-15 | End: 2024-01-15 | Stop reason: WASHOUT

## 2024-01-15 RX ORDER — DIPHENHYDRAMINE HYDROCHLORIDE 50 MG/ML
INJECTION INTRAMUSCULAR; INTRAVENOUS
Status: DISCONTINUED
Start: 2024-01-15 | End: 2024-01-15 | Stop reason: WASHOUT

## 2024-01-15 RX ORDER — CEFAZOLIN SODIUM/WATER 2 G/20 ML
SYRINGE (ML) INTRAVENOUS
Status: DISCONTINUED
Start: 2024-01-15 | End: 2024-01-15 | Stop reason: WASHOUT

## 2024-01-15 RX ORDER — LIDOCAINE HYDROCHLORIDE 10 MG/ML
INJECTION, SOLUTION INFILTRATION; PERINEURAL
Status: DISCONTINUED
Start: 2024-01-15 | End: 2024-01-15 | Stop reason: WASHOUT

## 2024-01-15 RX ORDER — HYDROMORPHONE HYDROCHLORIDE 2 MG/1
1 TABLET ORAL EVERY 4 HOURS PRN
Status: DISCONTINUED | OUTPATIENT
Start: 2024-01-15 | End: 2024-01-16

## 2024-01-15 NOTE — PLAN OF CARE
A&Ox 4. VSS. On room air. . IS encouraged. Telemetry monitoring. SCDs on BLE. Ankle pumps encouraged. NPO after midnight, possible OR Monday. Voiding. Pain controlled with PRN meds. TLSO brace WOOB. Up with min assist. Safety precautions maintained. Call light within reach.

## 2024-01-15 NOTE — PLAN OF CARE
ED admit 1/12 ABD and back pain, Pt is AAOX4, VSS, MRI found T8 end plate comp Fx, TELE, SCD's, IV SL, PO meds for pain, see MAR, up W/ TLSO brace in place, voiding freely to restroom or purewick, PT / OT following, plan for pain control, possible kyphoplasty today. Pt doing well, all needs met, all safety measures in place, call light within reach, will CTM.

## 2024-01-15 NOTE — PROGRESS NOTES
[facility]    Progress Note     Emma Trevizo Patient Status:  Observation    1940 MRN [id number]   Location [facility] 3SW-A Attending Jayson Govea MD   Hosp Day # 2 PCP Cathy Martinez MD     Follow up for: The primary encounter diagnosis was Abdominal pain, acute. Diagnoses of Nausea and vomiting, unspecified vomiting type and Intractable low back pain were also pertinent to this visit.      Interval History/Subjective:     Pt seen and examined.   She c/o non productive cough over the past few days.  She c/o back pain, awaiting kypho.  No F/C, N/V.     Vital signs:  Temp:  [97.6 °F (36.4 °C)-98.5 °F (36.9 °C)] 98 °F (36.7 °C)  Pulse:  [60-69] 64  Resp:  [16-18] 18  BP: (120-209)/(48-83) 209/83  SpO2:  [91 %-94 %] 92 %    Physical Exam:    General: NAD, Comfortable, Nontoxic   Respiratory: CTAB; reg resp rate & effort, no wheezes/crackles  Cardiovascular: S1, S2. Regular rate and rhythm. No murmurs appreciated  Abdomen: Soft, NTND, no guarding/rebound   Neurologic: No focal neurological deficits. Distal sensation intact to light touch  Extremities: No edema. TLSO in place.   Skin: Dry, no rashes, ulcers or lesions     Diagnostic Data:      Labs:  Recent Labs   Lab 24  184   WBC 10.4   HGB 12.2   MCV 99.2   .0       Recent Labs   Lab 24  1846 24   *  --    BUN 16  --    CREATSERUM 0.60  --    CA 8.9  --    ALB 3.3*  --    *  --    K  --  4.2     --    CO2 24.0  --    ALKPHO 96  --    AST  --  22   ALT 24  --    BILT 0.5  --    TP 7.4  --        No results for input(s): \"PTP\", \"INR\" in the last 168 hours.    No results for input(s): \"TROP\", \"CK\" in the last 168 hours.         Imaging: Imaging data reviewed in Epic.    Medications:    methylPREDNISolone  4 mg Oral TID CC and HS    [START ON 2024] methylPREDNISolone  4 mg Oral Daily with breakfast    [START ON 2024] methylPREDNISolone  4 mg Oral Daily with lunch    [START ON 2024]  methylPREDNISolone  4 mg Oral nightly    [START ON 1/17/2024] methylPREDNISolone  4 mg Oral Daily with breakfast    [START ON 1/17/2024] methylPREDNISolone  4 mg Oral Nightly    [START ON 1/18/2024] methylPREDNISolone  4 mg Oral Daily with breakfast    [Held by provider] heparin  5,000 Units Subcutaneous Q8H SHANA    amLODIPine  10 mg Oral Daily    brimonidine  1 drop Left Eye BID    timolol  1 drop Left Eye BID    losartan  50 mg Oral BID    metoprolol succinate ER  25 mg Oral 2x Daily(Beta Blocker)    atorvastatin  10 mg Oral Nightly    sulfaSALAzine  1,000 mg Oral BID       ASSESSMENT / PLAN:     Emma Trevizo Is a a 83 year old female who presents for evaluation of uncontrolled chronic back pain     Problem List / Diagnoses     Acute on Chronic back pain  Acute Closed T8 Compression Fracture, initial visit  Acute closed T9 compression fracture, subsequent visit  CAD  HTN  HL     Plan     Acute on Chronic back pain  Acute Closed T8 Compression Fracture, initial visit  Acute closed T9 compression fracture, subsequent visit  -No recent traumas or other inciting events that would suggest new injury  - Exam without evidence of neurologic impairment  - MRI shows new T8 endplate fracture  -- given high opioid needs, advanced age/fall risk, will consult IR for eval for possible kyphoplasty today; NPO at midnight, hold heparin at midnight  -Start Toradol 50 mg every 6 hours as needed  - Change oxycodone 5 to Norco tens, 1 to 2 tablets as needed as needed  -PT/OT -> home w/ HHPT      CAD  HTN  HL  -Stable, resume home meds     Acute cough  - unclear etiology, may be viral   - check expanded resp panel.  There have been reports of COVID positive cases at her facility.        DVT Mechanical Prophylaxis:   SCDs,    DVT Pharmacologic Prophylaxis   Medication    [Held by provider] heparin (Porcine) 5000 UNIT/ML injection 5,000 Units                Code Status: Full Code     Dispo: inpatient; JIM 1-2 days pending post  kyphoplasty course, pain control     Plan of care discussed with patient and/or family at bedside.    Mike Cheek DO   [facility]   [phone]      This note was created using voice recognition technology. It may include inadvertent transcriptional errors. Any such errors should be contextually interpreted and should not be taken to alter the content or the meaning.     Note to Patient: The 21st Century Cures Act makes medical notes like these available to patients in the interest of transparency. However, be advised this is a medical document. It is intended as peer to peer communication. It is written in medical language and may contain abbreviations or verbiage that are unfamiliar. It may appear blunt or direct. Medical documents are intended to carry relevant information, facts as evident, and the clinical opinion of the practitioner and not intended to be the primary source of your information.  Please refer directly to myself or clinical staff for information regarding plan of care.

## 2024-01-15 NOTE — CM/SW NOTE
01/15/24 1523   Choice of Post-Acute Provider   Informed patient of right to choose their preferred provider Yes   List of appropriate post-acute services provided to patient/family with quality data No - Declined list   [facility]   Information given to Patient       Spoke with pt regarding DC planning for [facility].  Pt agreeable with VER[facility]ICA and stated her preference for rehab at her [facility].  Pt's  also has COVID and he does not drive.  Pt stated that she wants her pain to be better controlled prior to discharge.  Insurance auth will be needed for [facility].  [place] reserved in [facility].  / to remain available for support and/or discharge planning.     [facility]  [phone]    eKlsey Rea [facility]  Discharge Planner  [phone]

## 2024-01-15 NOTE — CM/SW NOTE
Patient tested COVID+ today.  Updated [facility] referral for [facility] with request that facilities confirm if they can still accept.  Await responses in order to provide list of options to patient.  Insurance auth will be needed.  / to remain available for support and/or discharge planning.     Kelsey Rea, [facility]  Discharge Planner  [phone]

## 2024-01-16 LAB
ALBUMIN SERPL-MCNC: 3 G/DL (ref 3.4–5)
ALBUMIN/GLOB SERPL: 0.9 {RATIO} (ref 1–2)
ALP LIVER SERPL-CCNC: 82 U/L
ALT SERPL-CCNC: 24 U/L
ANION GAP SERPL CALC-SCNC: 3 MMOL/L (ref 0–18)
AST SERPL-CCNC: 21 U/L (ref 15–37)
BILIRUB SERPL-MCNC: 0.4 MG/DL (ref 0.1–2)
BILIRUB UR QL STRIP.AUTO: NEGATIVE
BUN BLD-MCNC: 14 MG/DL (ref 9–23)
CALCIUM BLD-MCNC: 8.6 MG/DL (ref 8.5–10.1)
CHLORIDE SERPL-SCNC: 105 MMOL/L (ref 98–112)
CLARITY UR REFRACT.AUTO: CLEAR
CO2 SERPL-SCNC: 29 MMOL/L (ref 21–32)
COLOR UR AUTO: YELLOW
CREAT BLD-MCNC: 0.29 MG/DL
EGFRCR SERPLBLD CKD-EPI 2021: 105 ML/MIN/1.73M2 (ref 60–?)
GLOBULIN PLAS-MCNC: 3.3 G/DL (ref 2.8–4.4)
GLUCOSE BLD-MCNC: 122 MG/DL (ref 70–99)
GLUCOSE UR STRIP.AUTO-MCNC: NORMAL MG/DL
LEUKOCYTE ESTERASE UR QL STRIP.AUTO: 75
NITRITE UR QL STRIP.AUTO: NEGATIVE
OSMOLALITY SERPL CALC.SUM OF ELEC: 286 MOSM/KG (ref 275–295)
PH UR STRIP.AUTO: 7 [PH] (ref 5–8)
POTASSIUM SERPL-SCNC: 4 MMOL/L (ref 3.5–5.1)
PROT SERPL-MCNC: 6.3 G/DL (ref 6.4–8.2)
RBC UR QL AUTO: NEGATIVE
SODIUM SERPL-SCNC: 137 MMOL/L (ref 136–145)
SP GR UR STRIP.AUTO: 1.02 (ref 1–1.03)
UROBILINOGEN UR STRIP.AUTO-MCNC: NORMAL MG/DL

## 2024-01-16 PROCEDURE — 81001 URINALYSIS AUTO W/SCOPE: CPT | Performed by: INTERNAL MEDICINE

## 2024-01-16 PROCEDURE — 87184 SC STD DISK METHOD PER PLATE: CPT | Performed by: INTERNAL MEDICINE

## 2024-01-16 PROCEDURE — 87086 URINE CULTURE/COLONY COUNT: CPT | Performed by: INTERNAL MEDICINE

## 2024-01-16 PROCEDURE — 87186 SC STD MICRODIL/AGAR DIL: CPT | Performed by: INTERNAL MEDICINE

## 2024-01-16 PROCEDURE — 87088 URINE BACTERIA CULTURE: CPT | Performed by: INTERNAL MEDICINE

## 2024-01-16 PROCEDURE — 94640 AIRWAY INHALATION TREATMENT: CPT

## 2024-01-16 PROCEDURE — 80053 COMPREHEN METABOLIC PANEL: CPT | Performed by: INTERNAL MEDICINE

## 2024-01-16 RX ORDER — HYDROMORPHONE HYDROCHLORIDE 2 MG/1
2 TABLET ORAL EVERY 4 HOURS PRN
Status: DISCONTINUED | OUTPATIENT
Start: 2024-01-16 | End: 2024-01-20

## 2024-01-16 RX ORDER — POLYETHYLENE GLYCOL 3350 17 G/17G
17 POWDER, FOR SOLUTION ORAL DAILY
Status: DISCONTINUED | OUTPATIENT
Start: 2024-01-16 | End: 2024-01-20

## 2024-01-16 RX ORDER — DOCUSATE SODIUM 100 MG/1
100 CAPSULE, LIQUID FILLED ORAL 2 TIMES DAILY
Status: DISCONTINUED | OUTPATIENT
Start: 2024-01-16 | End: 2024-01-20

## 2024-01-16 RX ORDER — ENOXAPARIN SODIUM 100 MG/ML
40 INJECTION SUBCUTANEOUS DAILY
Status: DISCONTINUED | OUTPATIENT
Start: 2024-01-16 | End: 2024-01-20

## 2024-01-16 RX ORDER — LABETALOL HYDROCHLORIDE 5 MG/ML
10 INJECTION, SOLUTION INTRAVENOUS EVERY 6 HOURS PRN
Status: DISCONTINUED | OUTPATIENT
Start: 2024-01-16 | End: 2024-01-20

## 2024-01-16 RX ORDER — SENNOSIDES 8.6 MG
8.6 TABLET ORAL NIGHTLY
Status: DISCONTINUED | OUTPATIENT
Start: 2024-01-16 | End: 2024-01-20

## 2024-01-16 RX ORDER — ALBUTEROL SULFATE 90 UG/1
2 AEROSOL, METERED RESPIRATORY (INHALATION) 4 TIMES DAILY
Status: DISCONTINUED | OUTPATIENT
Start: 2024-01-16 | End: 2024-01-20

## 2024-01-16 RX ORDER — MELATONIN
3 NIGHTLY
Status: DISCONTINUED | OUTPATIENT
Start: 2024-01-16 | End: 2024-01-20

## 2024-01-16 RX ORDER — BENZONATATE 100 MG/1
100 CAPSULE ORAL 3 TIMES DAILY PRN
Status: DISCONTINUED | OUTPATIENT
Start: 2024-01-16 | End: 2024-01-17

## 2024-01-16 NOTE — PROGRESS NOTES
[facility]    Progress Note     Emma Trevizo Patient Status:  Observation    1940 MRN [id number]   Location [facility] 3SW-A Attending Jayson Govea MD   Hosp Day # 3 PCP Cathy Martinez MD     Follow up for: The primary encounter diagnosis was Abdominal pain, acute. Diagnoses of Nausea and vomiting, unspecified vomiting type and Intractable low back pain were also pertinent to this visit.      Interval History/Subjective:     Pt seen and examined.   She tested positive for COVID yesterday, kyphoplasty postponed.   She c/o non productive cough and back pain.  No fevers.  Did not sleep well last night.      Vital signs:  Temp:  [98 °F (36.7 °C)-98.8 °F (37.1 °C)] 98.8 °F (37.1 °C)  Pulse:  [63-75] 74  Resp:  [18] 18  BP: (151-209)/(54-85) 168/60  SpO2:  [91 %-93 %] 93 %    Physical Exam:    General: NAD, Comfortable, Nontoxic   Respiratory: CTAB; reg resp rate & effort, no wheezes/crackles  Cardiovascular: S1, S2. Regular rate and rhythm. No murmurs appreciated  Abdomen: Soft, NTND, no guarding/rebound   Neurologic: No focal neurological deficits. Distal sensation intact to light touch  Extremities: No edema. TLSO in place.   Skin: Dry, no rashes, ulcers or lesions     Diagnostic Data:      Labs:  Recent Labs   Lab 24  1845   WBC 10.4   HGB 12.2   MCV 99.2   .0       Recent Labs   Lab 24  1846 24  0533   *  --  122*   BUN 16  --  14   CREATSERUM 0.60  --  0.29*   CA 8.9  --  8.6   ALB 3.3*  --  3.0*   *  --  137   K  --  4.2 4.0     --  105   CO2 24.0  --  29.0   ALKPHO 96  --  82   AST  --  22 21   ALT 24  --  24   BILT 0.5  --  0.4   TP 7.4  --  6.3*       No results for input(s): \"PTP\", \"INR\" in the last 168 hours.    No results for input(s): \"TROP\", \"CK\" in the last 168 hours.         Imaging: Imaging data reviewed in Epic.    Medications:    albuterol  2 puff Inhalation QID    nirmatrelvir-ritonavir  3 tablet Oral BID    amLODIPine  5  mg Oral Daily    methylPREDNISolone  4 mg Oral Daily with breakfast    methylPREDNISolone  4 mg Oral Daily with lunch    methylPREDNISolone  4 mg Oral nightly    [START ON 1/17/2024] methylPREDNISolone  4 mg Oral Daily with breakfast    [START ON 1/17/2024] methylPREDNISolone  4 mg Oral Nightly    [START ON 1/18/2024] methylPREDNISolone  4 mg Oral Daily with breakfast    heparin  5,000 Units Subcutaneous Q8H SHANA    brimonidine  1 drop Left Eye BID    timolol  1 drop Left Eye BID    losartan  50 mg Oral BID    metoprolol succinate ER  25 mg Oral 2x Daily(Beta Blocker)    sulfaSALAzine  1,000 mg Oral BID       ASSESSMENT / PLAN:     Emma Trevizo Is a a 83 year old female who presents for evaluation of uncontrolled chronic back pain     Problem List / Diagnoses     Acute on Chronic back pain  Acute Closed T8 Compression Fracture, initial visit  Acute closed T9 compression fracture, subsequent visit  CAD  HTN  HL     Plan     Acute on Chronic back pain  Acute Closed T8 Compression Fracture, initial visit  Acute closed T9 compression fracture, subsequent visit  -No recent traumas or other inciting events that would suggest new injury  - Exam without evidence of neurologic impairment  - MRI shows new T8 endplate fracture  -- kypho to be done 1/15 delayed for 10 days due to COVID infection.  TLSO when up.  -s/p Toradol 50 mg every 6 hours as needed  - changed norco to dilaudid 1 mg po Q4 PRN for pain given that she is on paxovid (interacts with norco and percocet)  -PT/OT -> home w/ HHPT initially.  Await repeat PT/OT eval as she may need VERÓNICA.       COVID-19 infection   - no hypoxia or PNA on CXR  - started on paxlovid given risk for severe disease     CAD  HTN  HL  -Stable, resume home meds  - amlodipine dose reduced while pt is on paxlovid   - statin on hold as pt is on paxlovid   - added PRN hydralazine and PRN labetalol for elevated BPs     DVT Mechanical Prophylaxis:   SCDs,    Lov subq      Code Status: Full Code      Dispo: inpatient; JIM 1-2 days pending pain control and PT/OT eval.  I suspect she will need VERÓNICA on discharge.     Plan of care discussed with patient who agrees.     Mike Cheek DO   [facility]   [phone]      This note was created using voice recognition technology. It may include inadvertent transcriptional errors. Any such errors should be contextually interpreted and should not be taken to alter the content or the meaning.     Note to Patient: The 21st Century Cures Act makes medical notes like these available to patients in the interest of transparency. However, be advised this is a medical document. It is intended as peer to peer communication. It is written in medical language and may contain abbreviations or verbiage that are unfamiliar. It may appear blunt or direct. Medical documents are intended to carry relevant information, facts as evident, and the clinical opinion of the practitioner and not intended to be the primary source of your information.  Please refer directly to myself or clinical staff for information regarding plan of care.

## 2024-01-16 NOTE — PLAN OF CARE
Pt Aox4, anxious at times. O2 2L- pt reporting lightheadedness due to frequent cough. Lungs clear bilat. Suction with yankauer at bedside- pt with frequent post tussive emesis. Tessalon and Robitussin for cough. Albuteral inhaler PRN. VSS. TLSo when OOB. Plan VERÓNICA when medically stable for DC

## 2024-01-16 NOTE — PAYOR COMM NOTE
--------------  ADMISSION REVIEW     Payor: [employer]  Subscriber #:  [id number]  Authorization Number: [id number]    Admit date: 1/12/24  Admit time: 11:26 PM          ED Provider Notes      Stated Complaint: back pain  no inj  oxy at 10am  no relief    Patient is an 83-year-old female presenting to the emergency department for evaluation of back pain, abdominal pain and vomiting.  Patient reports recent history of T9 compression fracture for which she had been prescribed medications which were changed yesterday.  This morning the patient had been having some back pain with radiation to the abdomen and had vomiting bilious contents.     last seen in the emergency department 4 days ago at which time she had come in due to exacerbation of her chronic back pain.  X-rays at that time did not show any acute fractures.  Patient was discharged on [place].  Since then she it appears she has been increasing her Norco and had baclofen and steroids added by her primary care doctor.  Aiken was then subsequently changed to oxycodone yesterday.      Positive for stated complaint: back pain  no inj  oxy at 10am  no relief        ED Triage Vitals [01/12/24 1722]   BP (!) 176/72   Pulse 82   Resp 18   Temp 99.7 °F (37.6 °C)   Temp src Temporal   SpO2 93 %   O2 Device None (Room air)       Labs Reviewed   COMP METABOLIC PANEL (14) - Abnormal; Notable for the following components:       Result Value    Glucose 128 (*)     Sodium 134 (*)     Albumin 3.3 (*)     A/G Ratio 0.8 (*)     All other components within normal limits   CBC W/ DIFFERENTIAL - Abnormal; Notable for the following components:    RBC 3.64 (*)     Neutrophil Absolute Prelim 9.44 (*)     Neutrophil Absolute 9.44 (*)     Lymphocyte Absolute 0.32 (*)     All other components within normal limits   LIPASE - Normal   TROPONIN I HIGH SENSITIVITY - Normal   REDRAW CMP (P) - Normal   CBC WITH DIFFERENTIAL WITH PLATELET    Narrative:     The following orders were  created for panel order CBC With Differential With Platelet.  Procedure                               Abnormality         Status                     ---------                               -----------         ------                     CBC W/ DIFFERENTIAL[[id number]]          Abnormal            Final result                 Please view results for these tests on the individual orders.     EKG    Rate, intervals and axes as noted on EKG Report.  Rate: 81  Rhythm: Sinus Rhythm  Reading: Normal sinus rhythm, normal intervals, normal axis, no ST or T wave abnormalities noted.       Extensive differential was considered including ureterolithiasis, UTI, diverticulitis, cholecystitis, appendicitis, colitis, gastroenteritis, bowel obstruction, and other gi, infectious, vascular, malignant, rheumatologic and other pathology.   Symptoms were addressed with iv fluids and iv medications.  Given severity of patient's symptoms and ED findings decision was made to admit the patient for further treatment and evaluation.      Clinical Impression:  1. Abdominal pain, acute    2. Nausea and vomiting, unspecified vomiting type    3. Intractable low back pain          H&P - H&P Note      History of Present Illness: Emma Treivzo is a 83 year old female with history of ankylosing spondylitis, multiple compression fractures requiring kyphoplasty in the past, recent atraumatic T9 compression fracture on 12/26 s/p kyphoplasty 12/29 who presents for evaluation of persistent uncontrolled pain since discharge home.  Per notes the patient Reported her pain was controlled with Norco following kyphoplasty and PT evaluated the patient with recommendations for home with [facility].        Emma Trevizo Is a a 83 year old female who presents for evaluation of uncontrolled chronic back pain    Problem List / Diagnoses    Chronic back pain  Acute closed T9 compression fracture, subsequent visit  CAD  HTN  HL    Plan    Chronic back pain  Acute closed T9  compression fracture, subsequent visit  -No recent traumas or other inciting events that would suggest new injury  - Exam without evidence of neurologic impairment  - Given timeline from kyphoplasty, persistent pain, will check MRI T/L-spine to ensure stability  -Start Toradol 50 mg every 6 hours as needed  - Change oxycodone 5 to Norco tens, 1 to 2 tablets as needed as needed  -PT/OT eval    1/15    The primary encounter diagnosis was Abdominal pain, acute. Diagnoses of Nausea and vomiting, unspecified vomiting type and Intractable low back pain were also pertinent to this visit.       Plan     Acute on Chronic back pain  Acute Closed T8 Compression Fracture, initial visit  Acute closed T9 compression fracture, subsequent visit  -No recent traumas or other inciting events that would suggest new injury  - Exam without evidence of neurologic impairment  - MRI shows new T8 endplate fracture  -- given high opioid needs, advanced age/fall risk, will consult IR for eval for possible kyphoplasty today; NPO at midnight, hold heparin at midnight  -Start Toradol 50 mg every 6 hours as needed  - Change oxycodone 5 to Norco tens, 1 to 2 tablets as needed as needed  -PT/OT -> home w/ HHPT     PLACED ON HEP GTT WHILE WAITING FOR    AC WASH OUT  Dispo: inpatient; JIM 1-2 days pending post kyphoplasty course, pain contro    MEDICATIONS ADMINISTERED IN LAST 1 DAY:  brimonidine (Alphagan) 0.2 % ophthalmic solution 1 drop       Date Action Dose Route User    1/15/2024 2029 Given 1 drop Left Eye Yeimy Lyons RN          dextromethorphan-guaiFENesin (Robitussin-DM)  mg/5mL oral solution 5 mL       Date Action Dose Route User    1/16/2024 0551 Given 5 mL Oral Yeimy Lyons RN          heparin (Porcine) 5000 UNIT/ML injection 5,000 Units       Date Action Dose Route User    1/16/2024 0543 Given 5,000 Units Subcutaneous (Left Upper Abdomen) Yeimy Lyons RN    1/15/2024 2029 Given 5,000 Units Subcutaneous (Left Lower  Abdomen) Yeimy Lyons RN          hydrALAzine (Apresoline) 20 mg/mL injection 10 mg       Date Action Dose Route User    1/16/2024 0542 Given 10 mg Intravenous Yeimy Lyons RN    1/15/2024 1142 Given 10 mg Intravenous James Jha RN            HYDROmorphone (Dilaudid) tab 1 mg       Date Action Dose Route User    1/16/2024 0415 Given 1 mg Oral Yeimy Lyons RN    1/15/2024 2249 Given 1 mg Oral Yeimy Lyons RN          losartan (Cozaar) tab 50 mg       Date Action Dose Route User    1/15/2024 2029 Given 50 mg Oral Yeimy Lyons RN          methylPREDNISolone (Medrol) tab 4 mg       Date Action Dose Route User    1/15/2024 2029 Given 4 mg Oral Yeimy Lyons RN    1/15/2024 1558 Given 4 mg Oral James Jha RN          metoprolol succinate ER (Toprol XL) 24 hr tab 25 mg       Date Action Dose Route User    1/15/2024 1832 Given 25 mg Oral James Jha RN          nirmatrelvir-ritonavir (Paxlovid) 300-100 MG therapy pack 3 tablet       Date Action Dose Route User    1/15/2024 2036 Given 3 tablet Oral Yeimy Lyons RN          sulfaSALAzine (Azulfidine) tab 1,000 mg       Date Action Dose Route User    1/15/2024 2029 Given 1,000 mg Oral Yeimy Lyons RN          timolol (Timoptic) 0.5 % ophthalmic solution 1 drop       Date Action Dose Route User    1/15/2024 2029 Given 1 drop Left Eye Yeimy Lyons RN             01/15/24 0541 -- 64 -- 196/71 93 % -- -- -- CD    01/15/24 0355 98 °F (36.7 °C) 60 17 161/55 94 % -- None (Room air) 0 L/min KL          CIWA Scores (since admission)       None

## 2024-01-16 NOTE — PLAN OF CARE
Patient A&O X4 on RA. Non-productive cough noted. VSS, /IS/telemetry- NSR. SCDs, subQ Heparin. Voiding freely via purewick, LBM 1/14. C/o back pain- PRN medication given and patient repositioned. PT/OT. Up x1-2 assist w/ walker. TLSO when OOB. Reminded to use call light. Plan to dc to [facility].

## 2024-01-17 LAB
ANION GAP SERPL CALC-SCNC: 5 MMOL/L (ref 0–18)
BUN BLD-MCNC: 17 MG/DL (ref 9–23)
CALCIUM BLD-MCNC: 9 MG/DL (ref 8.5–10.1)
CHLORIDE SERPL-SCNC: 101 MMOL/L (ref 98–112)
CO2 SERPL-SCNC: 29 MMOL/L (ref 21–32)
CREAT BLD-MCNC: 0.37 MG/DL
EGFRCR SERPLBLD CKD-EPI 2021: 99 ML/MIN/1.73M2 (ref 60–?)
GLUCOSE BLD-MCNC: 145 MG/DL (ref 70–99)
OSMOLALITY SERPL CALC.SUM OF ELEC: 284 MOSM/KG (ref 275–295)
POTASSIUM SERPL-SCNC: 4.4 MMOL/L (ref 3.5–5.1)
SODIUM SERPL-SCNC: 135 MMOL/L (ref 136–145)

## 2024-01-17 PROCEDURE — 97110 THERAPEUTIC EXERCISES: CPT

## 2024-01-17 PROCEDURE — 80048 BASIC METABOLIC PNL TOTAL CA: CPT | Performed by: INTERNAL MEDICINE

## 2024-01-17 PROCEDURE — 97530 THERAPEUTIC ACTIVITIES: CPT

## 2024-01-17 PROCEDURE — 97116 GAIT TRAINING THERAPY: CPT

## 2024-01-17 PROCEDURE — 97535 SELF CARE MNGMENT TRAINING: CPT

## 2024-01-17 RX ORDER — BENZONATATE 100 MG/1
100 CAPSULE ORAL 3 TIMES DAILY
Status: DISCONTINUED | OUTPATIENT
Start: 2024-01-17 | End: 2024-01-20

## 2024-01-17 NOTE — PHYSICAL THERAPY NOTE
PHYSICAL THERAPY TREATMENT NOTE - INPATIENT    Room Number: 372/372-A       Session: 2    Number of Visits to Meet Established Goals: 5    Presenting Problem: abdominal pain  Co-Morbidities : history of T9 fx last month    MRI thoracic and lumbar spine 1/13/24:  CONCLUSION:    1. Acute compression fracture involves the inferior T8 endplate.     Per EMR - Pt now COVID + as of 1/15 - contact droplet precautions per ID      ASSESSMENT     Pt on room air - prefers not to utilize TLSO (did not help with pain).     Pt up CGA level for bed mobility/transfers and short distance ambulation.  Unable to leave room d/t isolation precautions.      RN and SW updated with session findings.     At this time, Pt. presents with decreased balance, impaired strength, difficulty with gait/transfers resulting in downgrade of overall functional mobility.  Due to above deficits, Pt will benefit from continued IP PT, so that patient may achieve highest functional independence/return to baseline.       DISCHARGE RECOMMENDATIONS  PT Discharge Recommendations: Sub-acute rehabilitation     PLAN  PT Treatment Plan: Bed mobility;Body mechanics;Endurance;Energy conservation;Patient education;Gait training;Strengthening;Stair training;Transfer training;Balance training  Rehab Potential : Good  Frequency (Obs): 3-5x/week    CURRENT GOALS     Goal #1 Patient is able to demonstrate supine - sit EOB @ level: modified independent      Goal #2 Patient is able to demonstrate transfers Sit to/from Stand at assistance level: modified independent      Goal #3 Patient is able to ambulate 50 feet with assist device: walker - rolling at assistance level: modified independent      Goal #4     Goal #5     Goal #6     Goal Comments: Goals established on 1/13/2024 1/17/2024 all goals ongoing      SUBJECTIVE  \"Yeah i Am breathing\"    OBJECTIVE  Precautions: TLSO;Bed/chair alarm;Spine    WEIGHT BEARING RESTRICTION  Weight Bearing Restriction: None                 PAIN ASSESSMENT   Ratin  Location: mid back with movement, and with brace on  Management Techniques: Activity promotion;Relaxation;Repositioning    BALANCE                                                                                                                       Static Sitting: Fair  Dynamic Sitting: Fair -           Static Standing: Poor  Dynamic Standing: Poor -    ACTIVITY TOLERANCE                         O2 WALK         AM-PAC '6-Clicks' INPATIENT SHORT FORM - BASIC MOBILITY  How much difficulty does the patient currently have...  Patient Difficulty: Turning over in bed (including adjusting bedclothes, sheets and blankets)?: A Little   Patient Difficulty: Sitting down on and standing up from a chair with arms (e.g., wheelchair, bedside commode, etc.): A Little   Patient Difficulty: Moving from lying on back to sitting on the side of the bed?: A Little   How much help from another person does the patient currently need...   Help from Another: Moving to and from a bed to a chair (including a wheelchair)?: A Little   Help from Another: Need to walk in hospital room?: A Little   Help from Another: Climbing 3-5 steps with a railing?: A Little       AM-PAC Score:  Raw Score: 18   Approx Degree of Impairment: 46.58%   Standardized Score (AM-PAC Scale): 43.63   CMS Modifier (G-Code): CK    FUNCTIONAL ABILITY STATUS  Gait Assessment   Functional Mobility/Gait Assessment  Gait Assistance: Contact guard assist  Distance (ft): 30 (in room only)  Assistive Device: Rolling walker  Pattern:  (no brace)    Skilled Therapy Provided    Bed Mobility:  Rolling: CGA   Supine<>Sit: CGA   Sit<>Supine: NT     Transfer Mobility:  Sit<>Stand: CGA   Stand<>Sit: CGA   Gait: CGA with RW - short shuffled steps - flexed posture.     Therapist's Comments: room air noted desat to 92%, but able to recover to 96% after sitting in BS recliner.  Pt requested additional time for toileting/attempt for BM      Patient End of  Session: Up in chair;Needs met;Call light within reach;RN aware of session/findings;All patient questions and concerns addressed;Alarm set    PT Session Time: 25 minutes  Gait Trainin minutes  Therapeutic Activity: 15 minutes  Therapeutic Exercise: 0 minutes   Neuromuscular Re-education: 0 minutes

## 2024-01-17 NOTE — PLAN OF CARE
Pt Aox4, RA . IS. Frequent moist cough. Suction at bedside. Lovenox and SCD for VTE proph. TLSO when OOB. Ambulates with x1 assist and walker. Purewick in place. IVSL. Robitussin and Tessalon for cough, Albuterol inhaler. On course of Pavlovid for +COVID. Plan to Dc home when medically cleared.

## 2024-01-17 NOTE — PAYOR COMM NOTE
RECONSIDERATION REQUEST      --------------  CONTINUED STAY REVIEW  1/16-1/17  Payor: [employer]  Subscriber #:  [id number]  Authorization Number: [id number]    Admit date: 1/12/24  Admit time: 11:26 PM    Admitting Physician: Karely Eddy MD  Attending Physician:  Mike Cheek DO  Primary Care Physician: Cathy Martinez MD    REVIEW DOCUMENTATION:    1/16  Follow up for: The primary encounter diagnosis was Abdominal pain, acute. Diagnoses of Nausea and vomiting, unspecified vomiting type and Intractable low back pain were also pertinent to this visit.        Interval History/Subjective:      Pt seen and examined.   She tested positive for COVID yesterday, kyphoplasty postponed.   She c/o non productive cough and back pain.  No fevers.  Did not sleep well last night.      emp:  [98 °F (36.7 °C)-98.8 °F (37.1 °C)] 98.8 °F (37.1 °C)  Pulse:  [63-75] 74  Resp:  [18] 18  BP: (151-209)/(54-85) 168/60  SpO2:  [91 %-93 %] 93 %    Medications:    albuterol  2 puff Inhalation QID    nirmatrelvir-ritonavir  3 tablet Oral BID    amLODIPine  5 mg Oral Daily    methylPREDNISolone  4 mg Oral Daily with breakfast    methylPREDNISolone  4 mg Oral Daily with lunch    methylPREDNISolone  4 mg Oral nightly    [START ON 1/17/2024] methylPREDNISolone  4 mg Oral Daily with breakfast    [START ON 1/17/2024] methylPREDNISolone  4 mg Oral Nightly    [START ON 1/18/2024] methylPREDNISolone  4 mg Oral Daily with breakfast    heparin  5,000 Units Subcutaneous Q8H [facility]    brimonidine  1 drop Left Eye BID    timolol  1 drop Left Eye BID    losartan  50 mg Oral BID    metoprolol succinate ER  25 mg Oral 2x Daily(Beta Blocker)    sulfaSALAzine  1,000 mg Oral BID         ASSESSMENT / PLAN:      Emma Trevizo Is a a 83 year old female who presents for evaluation of uncontrolled chronic back pain     Problem List / Diagnoses     Acute on Chronic back pain  Acute Closed T8 Compression Fracture, initial visit  Acute closed T9  compression fracture, subsequent visit  CAD  HTN  HL     Plan     Acute on Chronic back pain  Acute Closed T8 Compression Fracture, initial visit  Acute closed T9 compression fracture, subsequent visit  -No recent traumas or other inciting events that would suggest new injury  - Exam without evidence of neurologic impairment  - MRI shows new T8 endplate fracture  -- kypho to be done 1/15 delayed for 10 days due to COVID infection.  TLSO when up.  -s/p Toradol 50 mg every 6 hours as needed  - changed norco to dilaudid 1 mg po Q4 PRN for pain given that she is on paxovid (interacts with norco and percocet)  -PT/OT -> home w/ HHPT initially.  Await repeat PT/OT eval as she may need VERÓNICA.       COVID-19 infection   - no hypoxia or PNA on CXR  - started on paxlovid given risk for severe disease      CAD  HTN  HL  -Stable, resume home meds  - amlodipine dose reduced while pt is on paxlovid   - statin on hold as pt is on paxlovid   - added PRN hydralazine and PRN labetalol for elevated BPs     DVT Mechanical Prophylaxis:   SCDs,    Lov subq       Code Status: Full Code      Dispo: inpatient; JIM 1-2 days pending pain control and PT/OT eval.  I suspect she will need VERÓNICA on discharge.             1/17  Follow up for: The primary encounter diagnosis was Abdominal pain, acute. Diagnoses of Nausea and vomiting, unspecified vomiting type and Intractable low back pain were also pertinent to this visit.        Interval History/Subjective:      Pt seen and examined.  She continues to c/o cough worsening her back pain.      Temp:  [97.4 °F (36.3 °C)-98.2 °F (36.8 °C)] 97.4 °F (36.3 °C)  Pulse:  [57-72] 72  Resp:  [17-19] 17  BP: (129-155)/(46-67) 130/59  SpO2:  [92 %-94 %] 94 %  Medications:    albuterol  2 puff Inhalation QID    enoxaparin  40 mg Subcutaneous Daily    docusate sodium  100 mg Oral BID    polyethylene glycol (PEG 3350)  17 g Oral Daily    sennosides  8.6 mg Oral Nightly    melatonin  3 mg Oral Nightly     nirmatrelvir-ritonavir  3 tablet Oral BID    amLODIPine  5 mg Oral Daily    methylPREDNISolone  4 mg Oral Nightly    [START ON 1/18/2024] methylPREDNISolone  4 mg Oral Daily with breakfast    brimonidine  1 drop Left Eye BID    timolol  1 drop Left Eye BID    losartan  50 mg Oral BID    metoprolol succinate ER  25 mg Oral 2x Daily(Beta Blocker)    sulfaSALAzine  1,000 mg Oral BID         ASSESSMENT / PLAN:      Emma Trevizo Is a a 83 year old female who presents for evaluation of uncontrolled chronic back pain     Problem List / Diagnoses     Acute on Chronic back pain  Acute Closed T8 Compression Fracture, initial visit  Acute closed T9 compression fracture, subsequent visit  CAD  HTN  HL     Plan     Acute on Chronic back pain  Acute Closed T8 Compression Fracture, initial visit  Acute closed T9 compression fracture, subsequent visit  -No recent traumas or other inciting events that would suggest new injury  - Exam without evidence of neurologic impairment  - MRI shows new T8 endplate fracture  -- kypho to be done 1/15 delayed for 10 days due to COVID infection.  TLSO when up.  -s/p Toradol 50 mg every 6 hours as needed  - changed norco to dilaudid 2 mg po Q4 PRN for pain given that she is on paxovid (interacts with norco and percocet)  -PT/OT -> VERÓNICA     COVID-19 infection   - no hypoxia or PNA on CXR  - started on paxlovid given risk for severe disease   - schedule tessalon, cont PRN robitussin      Proteus mirabilis UTI  - pt c/o urinary symptoms   - start empiric CTX  - f/u final C+S     CAD  HTN  HL  -Stable, resume home meds  - amlodipine dose reduced while pt is on paxlovid   - statin on hold as pt is on paxlovid   - added PRN hydralazine and PRN labetalol for elevated BPs     DVT Mechanical Prophylaxis:   SCDs,    Lov subq       Code Status: Full Code      Dispo: inpatient; JIM 1-2 days pending pain control and PT/OT eval.  I suspect she will need VERÓNICA on discharge.       Medications 01/11/24 01/12/24  01/13/24 01/14/24 01/15/24 01/16/24 01/17/24   albuterol (Ventolin HFA) 108 (90 Base) MCG/ACT inhaler 2 puff  Dose: 2 puff  Freq: 4 times daily Route: IN  Start: 01/16/24 0900   Admin Instructions:   RT To Administer First Dose.         0948 KM-Given     1300 KM-Given     1923 KM-Given      (0000 LR)-Not Given [C]     0840 KM-Given     1256 KM-Given     1700     2100        morphINE PF 4 MG/ML injection 4 mg  Dose: 4 mg  Freq: Once Route: IV  Start: 01/12/24 1923 End: 01/12/24 1933 1933 MM-Given [C]                        nirmatrelvir-ritonavir (Paxlovid) 300-100 MG therapy pack 3 tablet  Dose: 3 tablet  Freq: 2 times daily Route: OR  Start: 01/15/24 2100 End: 01/20/24 2059   Admin Instructions:     Cut blister cards in half and remove each individual dose from Pyxis to administer to the patient. Leave remaining doses in the Pyxis.      Administer tablets whole. Do not chew, break or crush the tablets.  Dose =  mg tablets of nirmatrelvir AND  mg tablet of ritonavir        2036 LR-Given      0948 KM-Given     2047 LR-Given      0900 KM-Given     2100        ondansetron (Zofran) 4 MG/2ML injection 4 mg  Dose: 4 mg  Freq: Once Route: IV  Start: 01/12/24 1924 End: 01/12/24 1932 1932 MM-Given                         Medications 01/11/24 01/12/24 01/13/24 01/14/24 01/15/24 01/16/24 01/17/24   sodium chloride 0.9% infusion  Rate: 83 mL/hr Freq: Continuous Route: IV  Last Dose: Stopped (01/14/24 0600)  Start: 01/12/24 2330 End: 01/13/24 0218   Admin Instructions:   ED holding order only  Cancel if other admission orders exist!      0019 CD-New Bag     0218-D/C'd    0600 DV-Stopped           sodium chloride 0.9% infusion  Rate: 125 mL/hr Dose: 125 mL/hr  Freq: Continuous Route: IV  Last Dose: Stopped (01/14/24 0600)  Start: 01/12/24 1925 End: 01/13/24 0815 1932 MM-New Bag [C]     2137 MM-Restarted [C]     2224 MM-Handoff [C]      0813 KM-New Bag     0815-D/C'd    0600 DV-Stopped                        Medications 01/11/24 01/12/24 01/13/24 01/14/24 01/15/24 01/16/24 01/17/24   benzonatate (Tessalon) cap 100 mg  Dose: 100 mg  Freq: 3 times daily PRN Route: OR  PRN Reason: cough  Start: 01/16/24 0757 End: 01/17/24 1310   Admin Instructions:   Do not crush         0949 KM-Given     2048 LR-Given      1300     1310-D/C'd      dextromethorphan-guaiFENesin (Robitussin-DM)  mg/5mL oral solution 5 mL  Dose: 5 mL  Freq: Every 6 hours PRN Route: OR  PRN Reasons: cough,congestion  Start: 01/15/24 1042   Admin Instructions:   (Maximum dose: guaifenesin 2,400 mg and dextromethorphan 120 mg per day)         0551 LR-Given     1542 KM-Given      0841 KM-Given        hydrALAzine (Apresoline) 20 mg/mL injection 10 mg  Dose: 10 mg  Freq: Every 6 hours PRN Route: IV  PRN Reason: Increased blood pressure  PRN Comment: SBP > 170  Start: 01/15/24 1127        1142 DV-Given      0542 LR-Given          HYDROcodone-acetaminophen (Norco)  MG per tab 1 tablet  Dose: 1 tablet  Freq: Every 4 hours PRN Route: OR  PRN Reason: mild pain  Start: 01/13/24 1629 End: 01/15/24 1738      1902 KM-Given      (0519 CD)-Not Given     1031 DV-Given                1738-D/C'd        Or  HYDROcodone-acetaminophen (Norco)  MG per tab 2 tablet  Dose: 2 tablet  Freq: Every 4 hours PRN Route: OR  PRN Reason: moderate pain  Start: 01/13/24 1629 End: 01/15/24 1738                      1546 DV-Given      1559 DV-Given     1738-D/C'd        HYDROmorphone (Dilaudid) tab 1 mg  Dose: 1 mg  Freq: Every 4 hours PRN Route: OR  PRN Reasons: moderate pain,severe pain  Start: 01/15/24 1737 End: 01/16/24 1545        2249 LR-Given      0415 LR-Given     1218 KM-Given     1545-D/C'd       HYDROmorphone (Dilaudid) tab 2 mg  Dose: 2 mg  Freq: Every 4 hours PRN Route: OR  PRN Reasons: moderate pain,severe pain  Start: 01/16/24 1544         1723 KM-Given      0003 LR-Given     1256 KM-Given        ketorolac (Toradol) 15 MG/ML injection 15 mg  Dose: 15 mg  Freq:  Every 6 hours PRN Route: IV  PRN Reason: mild pain  Start: 01/13/24 0956 End: 01/15/24 0955      1024 KM-Given     2252 CD-Given       0835 DV-Given     0955-D/C'd        lidocaine-menthol 4-1 % patch  Start: 01/12/24 2001 End: 01/12/24 2009   Admin Instructions:   Emy Mast   : cabinet override     2009 MM-Patch Applied [C]             morphINE PF 2 MG/ML injection 1 mg  Dose: 1 mg  Freq: Every 4 hours PRN Route: IV  PRN Reason: severe pain  Start: 01/13/24 0036 End: 01/13/24 0816      0048 CD-Given     0604 RD-Given     0816-D/C'd          oxyCODONE-acetaminophen (Percocet) 5-325 MG per tab 1 tablet  Dose: 1 tablet  Freq: Every 6 hours PRN Route: OR  PRN Reason: moderate pain  Start: 01/12/24 2358 End: 01/13/24 1042   Admin Instructions:   Maximum dose of acetaminophen is 4000 mg from all sources in 24 hours.      (0025 CD)-Not Given     0537 CD-Given     1042-D/C'd            Lab 01/12/24  1846 01/12/24 2000 01/16/24  0533 01/17/24  0542   *  --  122* 145*   BUN 16  --  14 17   CREATSERUM 0.60  --  0.29* 0.37*   CA 8.9  --  8.6 9.0   ALB 3.3*  --  3.0*  --    *  --  137 135*   K  --  4.2 4.0 4.4     --  105 101   CO2 24.0  --  29.0 29.0   ALKPHO 96  --  82  --    AST  --  22 21  --    ALT 24  --  24  --    BILT 0.5  --  0.4  --    TP 7.4  --  6.3*  --          Vitals (last day)       Date/Time Temp Pulse Resp BP SpO2 Weight O2 Device O2 Flow Rate (L/min) Who    01/17/24 1109 97.4 °F (36.3 °C) 72 17 130/59 94 % -- None (Room air) -- NS    01/17/24 0816 97.4 °F (36.3 °C) 64 18 146/58 93 % -- None (Room air) -- NS    01/17/24 0336 97.6 °F (36.4 °C) 59 17 155/52 92 % -- None (Room air) 0 L/min KL    01/17/24 0000 98 °F (36.7 °C) 57 18 133/46 93 % -- None (Room air) -- LR    01/16/24 2023 98.2 °F (36.8 °C) 70 18 134/62 93 % -- None (Room air) 0 L/min KL    01/16/24 1545 97.8 °F (36.6 °C) 68 19 129/67 94 % -- -- -- DW    01/16/24 1245 98.3 °F (36.8 °C) 93 19 145/59 95 % -- Nasal cannula  -- DW    01/16/24 0745 98.8 °F (37.1 °C) 74 18 168/60 93 % -- -- -- DW    01/16/24 0622 -- -- -- 155/54 -- -- -- -- RR    01/16/24 0530 98.6 °F (37 °C) 75 18 183/84 92 % -- None (Room air) -- RR

## 2024-01-17 NOTE — OCCUPATIONAL THERAPY NOTE
OCCUPATIONAL THERAPY TREATMENT NOTE - INPATIENT     Room Number: 372/372-A  Session: 2  Number of Visits to Meet Established Goals: 5    Presenting Problem: abdominal pain, new T8 fx, COVID    History: Patient is a 83 year old female admitted on 1/12/2024 with Presenting Problem: Abdominal pain. Co-Morbidities : history of T9 fx last month with kyphoplasty  Addendum 1/14: MRI thoracic+lumbar spine 1/13/24 indicates acute compression fracture of T8 endplate, being treated with TLSO; spoke with hospitalist after session who states TLSO is for comfort only, patient is not required to wear if too uncomfortable.  Addendum 1/17: Patient now with new diagnosis of COVID.    ASSESSMENT   Patient presents with the following performance deficits: increased pain, decreased endurance, decreased knowledge of spine precautions and incorporation into ADLs, decreased knowledge of adaptive techniques, decreased balance. These deficits impact the patient’s ability to participate in ADL, transfers, instrumental activities of daily living, rest and sleep, leisure and social participation. Continue to recommend [facility]  If patient unable or unwilling to discharge to [facility]), will require increased assistance from [facility] staff for ADls, patient feels she will be able to arrange this if needed, however may be more difficult now due to COVID diagnosis.    The patient is functioning below her previous functional level and would benefit from skilled inpatient OT to address the above deficits, maximizing patient’s ability to return safely to her prior level of function.    OT Discharge Recommendations: Sub-acute rehabilitation  OT Device Recommendations: Shower chair;Grab bars    WEIGHT BEARING RESTRICTION  Weight Bearing Restriction: None                Recommendations for nursing staff:   Transfers: 1-person  Toileting location: toilet    TREATMENT SESSION:  Patient Start of Session: supine  FUNCTIONAL TRANSFER ASSESSMENT  Sit to Stand: Edge of  Bed  Edge of Bed: Contact Guard Assist  Toilet Transfer: Contact Guard Assist (commode over toilet)    BED MOBILITY  Rolling: Contact Guard Assist  Supine to Sit : Contact Guard Assist (tactile cues for logroll)    BALANCE ASSESSMENT  Static Sitting: Supervision  Sitting Bilateral: Contact Guard Assist  Static Standing: Contact Guard Assist  Standing Bilateral: Minimal Assist    FUNCTIONAL ADL ASSESSMENT  Grooming Seated: Not Tested  LB Dressing Seated: Maximum Assist (to don depends over feet)  LB Dressing Standing: Maximum Assist (donning pants fully to waist with steadying)  Toileting Seated: Supervision (front pericare)  Toileting Standing: Maximum Assist (for thoroughness of rear pericare, donning pants fully to waist with steadying)      ACTIVITY TOLERANCE:                          O2 SATURATIONS  Oxygen Therapy  SPO2% on Room Air at Rest: 96    EDUCATION PROVIDED  Patient : Role of Occupational Therapy; Plan of Care; Functional Transfer Techniques; Fall Prevention; Posture/Positioning; Compensatory ADL Techniques; Proper Body Mechanics  Patient's Response to Education: Verbalized Understanding; Requires Further Education      Equipment used: RW  Demonstrates functional use, Would benefit from additional trial       Therapist comments: Patient agreeable to therapy with encouragement; reinforced on spine precautions and incorporation into ADLs, reviewed brace management for comfort however patient declining wearing at this time; Patient requiring grossly CGA for bed mobility, max for LB dressing and toileting; briefly reviewed adaptive techniques/equipment but patient will requiring ongoing reinforcement, reports  is 89 and only minimally able to assist. Patient remains highly limited by severe back pain with movement. Will continue to follow.  Patient End of Session: Up in chair;Needs met;Call light within reach;All patient questions and concerns addressed;Alarm set    SUBJECTIVE  \"No, I don't want the  brace\" [brace for comfort only]    PAIN ASSESSMENT  Rating: Unable to rate  Location: back with movement  Management Techniques: Activity promotion;Body mechanics;Relaxation;Repositioning     OBJECTIVE  Precautions: Spine;Bed/chair alarm;TLSO (TLSO for comfort)    AM-PAC ‘6-Clicks’ Inpatient Daily Activity Short Form  -   Putting on and taking off regular lower body clothing?: Total  -   Bathing (including washing, rinsing, drying)?: A Lot  -   Toileting, which includes using toilet, bedpan or urinal? : A Lot  -   Putting on and taking off regular upper body clothing?: A Lot  -   Taking care of personal grooming such as brushing teeth?: None  -   Eating meals?: None    AM-PAC Score:  Score: 15  Approx Degree of Impairment: 56.46%  Standardized Score (AM-PAC Scale): 34.69    PLAN  OT Treatment Plan: Energy conservation/work simplification techniques;Balance activities;ADL training;IADL training;Functional transfer training;UE strengthening/ROM;Patient/Family education;Patient/Family training;Equipment eval/education  Rehab Potential : Good  Frequency: 3-5x/week    Goals in progress 1/17  ADL Goals   Patient will perform all ADLs: with supervision  Patient will perform grooming: with supervision  Patient will perform upper body dressing:  with supervision  Patient will perform lower body dressing:  with supervision  Patient will perform toileting: with supervision    Functional Transfer Goals  Patient will perform all functional transfers:  with supervision    UE Exercise Program Goal  Patient will be independent with bilateral AROM HEP (home exercise program).    ADDITIONAL GOALS added 1/14  Patient will maintain spine precautions with min cueing  Patient will demonstrate brace management with min assist (if applicable, brace for comfort only)    OT Session Time: 25 minutes  Self-Care Home Management: 10 minutes  Therapeutic Activity: 15 minutes

## 2024-01-17 NOTE — CM/SW NOTE
Updates sent to [facility] via [employer].  Message sent asking them to contact pt's insurance to request auth for VERÓNICA.  Await insurance approval and medical clearance for discharge.  / to remain available for support and/or discharge planning.     [facility]  [phone]    Kelsey Rea [facility]  Discharge Planner  [phone]    Addendum:  Received message from [facility] that they have received insurance auth for VERÓNICA, valid through 1/22.  Await medical clearance for discharge.

## 2024-01-17 NOTE — PROGRESS NOTES
[facility]    Progress Note     Emma Trevizo Patient Status:  Observation    1940 MRN [id number]   Location [facility] 3SW-A Attending Jayson Govea MD   Hosp Day # 4 PCP Cathy Martinez MD     Follow up for: The primary encounter diagnosis was Abdominal pain, acute. Diagnoses of Nausea and vomiting, unspecified vomiting type and Intractable low back pain were also pertinent to this visit.      Interval History/Subjective:     Pt seen and examined.  She continues to c/o cough worsening her back pain.  No F/C, N/V.          Vital signs:  Temp:  [97.4 °F (36.3 °C)-98.2 °F (36.8 °C)] 97.4 °F (36.3 °C)  Pulse:  [57-72] 72  Resp:  [17-19] 17  BP: (129-155)/(46-67) 130/59  SpO2:  [92 %-94 %] 94 %    Physical Exam:    General: NAD, uncomfortable, Nontoxic   Respiratory: CTAB; reg resp rate & effort, no wheezes/crackles  Cardiovascular: S1, S2. Regular rate and rhythm. No murmurs appreciated  Abdomen: Soft, NTND, no guarding/rebound   Neurologic: No focal neurological deficits. Distal sensation intact to light touch  Extremities: No edema. TLSO in place.   Skin: Dry, no rashes, ulcers or lesions     Diagnostic Data:      Labs:  Recent Labs   Lab 24   WBC 10.4   HGB 12.2   MCV 99.2   .0       Recent Labs   Lab 24  1846 24  0533 24  0542   *  --  122* 145*   BUN 16  --  14 17   CREATSERUM 0.60  --  0.29* 0.37*   CA 8.9  --  8.6 9.0   ALB 3.3*  --  3.0*  --    *  --  137 135*   K  --  4.2 4.0 4.4     --  105 101   CO2 24.0  --  29.0 29.0   ALKPHO 96  --  82  --    AST  --  22 21  --    ALT 24  --  24  --    BILT 0.5  --  0.4  --    TP 7.4  --  6.3*  --        No results for input(s): \"PTP\", \"INR\" in the last 168 hours.    No results for input(s): \"TROP\", \"CK\" in the last 168 hours.         Imaging: Imaging data reviewed in Epic.    Medications:    albuterol  2 puff Inhalation QID    enoxaparin  40 mg Subcutaneous Daily    docusate sodium   100 mg Oral BID    polyethylene glycol (PEG 3350)  17 g Oral Daily    sennosides  8.6 mg Oral Nightly    melatonin  3 mg Oral Nightly    nirmatrelvir-ritonavir  3 tablet Oral BID    amLODIPine  5 mg Oral Daily    methylPREDNISolone  4 mg Oral Nightly    [START ON 1/18/2024] methylPREDNISolone  4 mg Oral Daily with breakfast    brimonidine  1 drop Left Eye BID    timolol  1 drop Left Eye BID    losartan  50 mg Oral BID    metoprolol succinate ER  25 mg Oral 2x Daily(Beta Blocker)    sulfaSALAzine  1,000 mg Oral BID       ASSESSMENT / PLAN:     Emma Trevizo Is a a 83 year old female who presents for evaluation of uncontrolled chronic back pain     Problem List / Diagnoses     Acute on Chronic back pain  Acute Closed T8 Compression Fracture, initial visit  Acute closed T9 compression fracture, subsequent visit  CAD  HTN  HL     Plan     Acute on Chronic back pain  Acute Closed T8 Compression Fracture, initial visit  Acute closed T9 compression fracture, subsequent visit  -No recent traumas or other inciting events that would suggest new injury  - Exam without evidence of neurologic impairment  - MRI shows new T8 endplate fracture  -- kypho to be done 1/15 delayed for 10 days due to COVID infection.  TLSO when up.  -s/p Toradol 50 mg every 6 hours as needed  - changed norco to dilaudid 2 mg po Q4 PRN for pain given that she is on paxovid (interacts with norco and percocet)  -PT/OT -> VERÓNICA     COVID-19 infection   - no hypoxia or PNA on CXR  - started on paxlovid given risk for severe disease   - schedule tessalon, cont PRN robitussin     Proteus mirabilis UTI  - pt c/o urinary symptoms   - start empiric CTX  - f/u final C+S    CAD  HTN  HL  -Stable, resume home meds  - amlodipine dose reduced while pt is on paxlovid   - statin on hold as pt is on paxlovid   - added PRN hydralazine and PRN labetalol for elevated BPs     DVT Mechanical Prophylaxis:   SCDs,    Lov subq      Code Status: Full Code     Dispo: inpatient;  JIM 1-2 days pending pain control and PT/OT eval.  I suspect she will need VERÓNICA on discharge.     Plan of care discussed with patient who agrees.     Mike Cheek DO   [facility]   [phone]      This note was created using voice recognition technology. It may include inadvertent transcriptional errors. Any such errors should be contextually interpreted and should not be taken to alter the content or the meaning.     Note to Patient: The 21st Century Cures Act makes medical notes like these available to patients in the interest of transparency. However, be advised this is a medical document. It is intended as peer to peer communication. It is written in medical language and may contain abbreviations or verbiage that are unfamiliar. It may appear blunt or direct. Medical documents are intended to carry relevant information, facts as evident, and the clinical opinion of the practitioner and not intended to be the primary source of your information.  Please refer directly to myself or clinical staff for information regarding plan of care.

## 2024-01-17 NOTE — PLAN OF CARE
Patient A&O X4 on RA. Non-productive cough noted- PRNs given with some relief. VSS, /IS/telemetry- NSR. SCDs, lovenox. Voiding freely via purewick, LBM 1/14- bowel regimen started. C/o back pain- PRN medication given and patient repositioned. PT/OT. Up x1-2 assist w/ walker. TLSO when OOB. Reminded to use call light. Plan to dc to [facility].

## 2024-01-18 NOTE — PROGRESS NOTES
[facility]    Progress Note     Emma Trevizo Patient Status:  Observation    1940 MRN [id number]   Location [facility] 3SW-A Attending Jayson Govea MD   Hosp Day # 5 PCP Cathy Martinez MD     Follow up for: The primary encounter diagnosis was Abdominal pain, acute. Diagnoses of Nausea and vomiting, unspecified vomiting type and Intractable low back pain were also pertinent to this visit.      Interval History/Subjective:     Pt seen and examined.  States her cough and back pain are improved.  No F/C, N/V.          Vital signs:  Temp:  [97.4 °F (36.3 °C)-98.2 °F (36.8 °C)] 97.5 °F (36.4 °C)  Pulse:  [64-72] 68  Resp:  [17-18] 18  BP: (130-164)/(49-62) 147/62  SpO2:  [92 %-100 %] 92 %    Physical Exam:    General: NAD, Nontoxic   Respiratory: CTAB; reg resp rate & effort, no wheezes/crackles  Cardiovascular: S1, S2. Regular rate and rhythm. No murmurs appreciated  Abdomen: Soft, NTND, no guarding/rebound   Neurologic: No focal neurological deficits. Distal sensation intact to light touch  Extremities: No edema. TLSO in place.   Skin: Dry, no rashes, ulcers or lesions     Diagnostic Data:      Labs:  Recent Labs   Lab 24  1845   WBC 10.4   HGB 12.2   MCV 99.2   .0       Recent Labs   Lab 24  1846 24  0533 24  0542   *  --  122* 145*   BUN 16  --  14 17   CREATSERUM 0.60  --  0.29* 0.37*   CA 8.9  --  8.6 9.0   ALB 3.3*  --  3.0*  --    *  --  137 135*   K  --  4.2 4.0 4.4     --  105 101   CO2 24.0  --  29.0 29.0   ALKPHO 96  --  82  --    AST  --  22 21  --    ALT 24  --  24  --    BILT 0.5  --  0.4  --    TP 7.4  --  6.3*  --        No results for input(s): \"PTP\", \"INR\" in the last 168 hours.    No results for input(s): \"TROP\", \"CK\" in the last 168 hours.         Imaging: Imaging data reviewed in Epic.    Medications:    benzonatate  100 mg Oral TID    cefTRIAXone  1 g Intravenous Q24H    albuterol  2 puff Inhalation QID     enoxaparin  40 mg Subcutaneous Daily    docusate sodium  100 mg Oral BID    polyethylene glycol (PEG 3350)  17 g Oral Daily    sennosides  8.6 mg Oral Nightly    melatonin  3 mg Oral Nightly    nirmatrelvir-ritonavir  3 tablet Oral BID    amLODIPine  5 mg Oral Daily    methylPREDNISolone  4 mg Oral Daily with breakfast    brimonidine  1 drop Left Eye BID    timolol  1 drop Left Eye BID    losartan  50 mg Oral BID    metoprolol succinate ER  25 mg Oral 2x Daily(Beta Blocker)    sulfaSALAzine  1,000 mg Oral BID       ASSESSMENT / PLAN:     Emma Trevizo Is a a 83 year old female who presents for evaluation of uncontrolled chronic back pain     Problem List / Diagnoses     Acute on Chronic back pain  Acute Closed T8 Compression Fracture, initial visit  Acute closed T9 compression fracture, subsequent visit  CAD  HTN  HL     Plan     Acute on Chronic back pain  Acute Closed T8 Compression Fracture, initial visit  Acute closed T9 compression fracture, subsequent visit  -No recent traumas or other inciting events that would suggest new injury  - Exam without evidence of neurologic impairment  - MRI shows new T8 endplate fracture  -- kypho to be done 1/15 delayed for 10 days due to COVID infection.  TLSO when up.  -s/p Toradol 50 mg every 6 hours as needed  - changed norco to dilaudid 2 mg po Q4 PRN for pain given that she is on paxovid (interacts with norco and percocet)  -PT/OT -> [facility]  - in discussion with IR about rescheduling kypho for 1/25 or 1/26 as outpt.  SW to be notified of the time, pt to go from [facility] to get the procedure.   1/25/24  at 2 pm.       COVID-19 infection   - no hypoxia or PNA on CXR  - started on paxlovid given risk for severe disease   - schedule tessalon, cont PRN robitussin     Proteus mirabilis UTI  - pt c/o urinary symptoms   - start empiric CTX  - f/u final C+S    CAD  HTN  HL  -Stable, resume home meds  - amlodipine dose reduced while pt is on paxlovid   - statin on hold as pt is on paxlovid    - added PRN hydralazine and PRN labetalol for elevated BPs     DVT Mechanical Prophylaxis:   SCDs,    Lov subq      Code Status: Full Code     Dispo: inpatient; Likely discharge to [facility] tomorrow.     Plan of care discussed with patient who agrees.     Mike Cheek DO   [facility]   [phone]      This note was created using voice recognition technology. It may include inadvertent transcriptional errors. Any such errors should be contextually interpreted and should not be taken to alter the content or the meaning.     Note to Patient: The 21st Century Cures Act makes medical notes like these available to patients in the interest of transparency. However, be advised this is a medical document. It is intended as peer to peer communication. It is written in medical language and may contain abbreviations or verbiage that are unfamiliar. It may appear blunt or direct. Medical documents are intended to carry relevant information, facts as evident, and the clinical opinion of the practitioner and not intended to be the primary source of your information.  Please refer directly to myself or clinical staff for information regarding plan of care.

## 2024-01-18 NOTE — PLAN OF CARE
Patient is alert and oriented x 4. Vitals stable on room air. Pain managed by PO meds. Denies numbness & tingling. Voiding without difficulty using purewick. Last BM 1/14/23 bowel regimen in place. Tolerating regular diet. Suction at bedside. Up x1 assist with walker with chairback brace.Plan to discharge to [facility], reschedule kypho, and pain control. Plan of care discussed with patient.

## 2024-01-19 ENCOUNTER — APPOINTMENT (OUTPATIENT)
Dept: GENERAL RADIOLOGY | Facility: HOSPITAL | Age: 84
End: 2024-01-19
Attending: INTERNAL MEDICINE
Payer: MEDICARE

## 2024-01-19 PROCEDURE — 72072 X-RAY EXAM THORAC SPINE 3VWS: CPT | Performed by: INTERNAL MEDICINE

## 2024-01-19 PROCEDURE — 72110 X-RAY EXAM L-2 SPINE 4/>VWS: CPT | Performed by: INTERNAL MEDICINE

## 2024-01-19 RX ORDER — BISACODYL 10 MG
10 SUPPOSITORY, RECTAL RECTAL
Status: DISCONTINUED | OUTPATIENT
Start: 2024-01-19 | End: 2024-01-20

## 2024-01-19 RX ORDER — ONDANSETRON 2 MG/ML
INJECTION INTRAMUSCULAR; INTRAVENOUS
Status: COMPLETED
Start: 2024-01-19 | End: 2024-01-19

## 2024-01-19 RX ORDER — ONDANSETRON 2 MG/ML
4 INJECTION INTRAMUSCULAR; INTRAVENOUS EVERY 6 HOURS PRN
Status: DISCONTINUED | OUTPATIENT
Start: 2024-01-19 | End: 2024-01-20

## 2024-01-19 NOTE — PROGRESS NOTES
1500: Assumed care of patient. Received report from RN. Patient resting comfortably in bed. States pain is controlled. Encouraged to call for any needs. Care ongoing.

## 2024-01-19 NOTE — PLAN OF CARE
Patient is alert and oriented x 4. Vitals stable on room air. Tele (NSR). Pain managed by PO meds. Denies numbness & tingling. Voiding without difficulty using purewick. Last BM 1/14/24 bowel regimen in place. Tolerating regular diet. Up minimal assist with walker and TLSO brace. Plan to discharge to [facility] and kyphoplasty 1/25/24. Plan of care discussed with patient.

## 2024-01-19 NOTE — PHYSICAL THERAPY NOTE
Attempted to see Pt this AM - RN aware of attempt.  Pt awaiting additional x-ray - MD Cheek ordered recently.  Pt wanting to wait for mobility until after completion of testing.  Will f/u later today if time permits, after all other patients are attempted per tentative schedule.

## 2024-01-19 NOTE — PROGRESS NOTES
[facility]    Progress Note     Emma Trevizo Patient Status:  Observation    1940 MRN [id number]   Location [facility] 3SW-A Attending Jayson Govea MD   Hosp Day # 6 PCP Cathy Martinez MD     Follow up for: The primary encounter diagnosis was Abdominal pain, acute. Diagnoses of Nausea and vomiting, unspecified vomiting type, Intractable low back pain, and Burst fracture of T12 vertebra with delayed healing were also pertinent to this visit.      Interval History/Subjective:     Pt seen and examined.  She c/o worsening back pain today, she is concerned about another compression fx.  Still with cough.  No F/C, N/V.          Vital signs:  Temp:  [97.6 °F (36.4 °C)-98.5 °F (36.9 °C)] 98.2 °F (36.8 °C)  Pulse:  [61-69] 61  Resp:  [16-18] 18  BP: (131-150)/(50-59) 150/58  SpO2:  [92 %-98 %] 92 %    Physical Exam:    General: NAD, Nontoxic   Respiratory: CTAB; reg resp rate & effort, no wheezes/crackles  Cardiovascular: S1, S2. Regular rate and rhythm. No murmurs appreciated  Abdomen: Soft, NTND, no guarding/rebound   Neurologic: No focal neurological deficits. Distal sensation intact to light touch  Extremities: No edema. TLSO in place.   Skin: Dry, no rashes, ulcers or lesions     Diagnostic Data:      Labs:  Recent Labs   Lab 24  1845   WBC 10.4   HGB 12.2   MCV 99.2   .0       Recent Labs   Lab 24  1846 24  0533 24  0542   *  --  122* 145*   BUN 16  --  14 17   CREATSERUM 0.60  --  0.29* 0.37*   CA 8.9  --  8.6 9.0   ALB 3.3*  --  3.0*  --    *  --  137 135*   K  --  4.2 4.0 4.4     --  105 101   CO2 24.0  --  29.0 29.0   ALKPHO 96  --  82  --    AST  --  22 21  --    ALT 24  --  24  --    BILT 0.5  --  0.4  --    TP 7.4  --  6.3*  --        No results for input(s): \"PTP\", \"INR\" in the last 168 hours.    No results for input(s): \"TROP\", \"CK\" in the last 168 hours.         Imaging: Imaging data reviewed in Epic.    Medications:     benzonatate  100 mg Oral TID    cefTRIAXone  1 g Intravenous Q24H    albuterol  2 puff Inhalation QID    enoxaparin  40 mg Subcutaneous Daily    docusate sodium  100 mg Oral BID    polyethylene glycol (PEG 3350)  17 g Oral Daily    sennosides  8.6 mg Oral Nightly    melatonin  3 mg Oral Nightly    nirmatrelvir-ritonavir  3 tablet Oral BID    amLODIPine  5 mg Oral Daily    brimonidine  1 drop Left Eye BID    timolol  1 drop Left Eye BID    losartan  50 mg Oral BID    metoprolol succinate ER  25 mg Oral 2x Daily(Beta Blocker)    sulfaSALAzine  1,000 mg Oral BID       ASSESSMENT / PLAN:     Emma Trevizo Is a a 83 year old female who presents for evaluation of uncontrolled chronic back pain     Problem List / Diagnoses     Acute on Chronic back pain  Acute Closed T8 Compression Fracture, initial visit  Acute closed T9 compression fracture, subsequent visit  CAD  HTN  HL     Plan     Acute on Chronic back pain  Acute Closed T8 Compression Fracture, initial visit  Acute closed T9 compression fracture, subsequent visit  -No recent traumas or other inciting events that would suggest new injury  - Exam without evidence of neurologic impairment  - MRI shows new T8 endplate fracture  -- kypho to be done 1/15 delayed for 10 days due to COVID infection.  TLSO when up.  -s/p Toradol 50 mg every 6 hours as needed  - changed norco to dilaudid 2 mg po Q4 PRN for pain given that she is on paxovid (interacts with norco and percocet)  -PT/OT -> [facility]  - in discussion with IR about rescheduling kypho for 1/25 or 1/26 as outpt.  SW to be notified of the time, pt to go from [facility] to get the procedure.   1/25/24  at 2 pm.    - given reports of worsening back pain with cough, d/w IR, check XR of T and L spine to eval for new compression fx.  If new fx seen pt will need repeat MRI.      COVID-19 infection   - no hypoxia or PNA on CXR  - started on paxlovid given risk for severe disease   - schedule tessalon, cont PRN robitussin     Proteus  mirabilis UTI  - pt c/o urinary symptoms   - start empiric CTX  - f/u final C+S    CAD  HTN  HL  -Stable, resume home meds  - amlodipine dose reduced while pt is on paxlovid   - statin on hold as pt is on paxlovid   - added PRN hydralazine and PRN labetalol for elevated BPs     DVT Mechanical Prophylaxis:   SCDs,    Lov subq      Code Status: Full Code     Dispo: inpatient; Likely discharge to [facility] later today vs tomorrow pending XR results.     Plan of care discussed with patient who agrees.     Mike Cheek DO   [facility]   [phone]      This note was created using voice recognition technology. It may include inadvertent transcriptional errors. Any such errors should be contextually interpreted and should not be taken to alter the content or the meaning.     Note to Patient: The 21st Century Cures Act makes medical notes like these available to patients in the interest of transparency. However, be advised this is a medical document. It is intended as peer to peer communication. It is written in medical language and may contain abbreviations or verbiage that are unfamiliar. It may appear blunt or direct. Medical documents are intended to carry relevant information, facts as evident, and the clinical opinion of the practitioner and not intended to be the primary source of your information.  Please refer directly to myself or clinical staff for information regarding plan of care.

## 2024-01-19 NOTE — OCCUPATIONAL THERAPY NOTE
Attempted to see pt for skilled OT services this date. Per RN, XR ordered for thoracic/spine to r/o new compression fx. Will follow-up later today as schedule permits.

## 2024-01-20 ENCOUNTER — APPOINTMENT (OUTPATIENT)
Dept: GENERAL RADIOLOGY | Facility: HOSPITAL | Age: 84
End: 2024-01-20
Attending: INTERNAL MEDICINE
Payer: MEDICARE

## 2024-01-20 VITALS
HEIGHT: 66 IN | DIASTOLIC BLOOD PRESSURE: 41 MMHG | WEIGHT: 161 LBS | TEMPERATURE: 98 F | RESPIRATION RATE: 16 BRPM | SYSTOLIC BLOOD PRESSURE: 115 MMHG | OXYGEN SATURATION: 94 % | HEART RATE: 58 BPM | BODY MASS INDEX: 25.88 KG/M2

## 2024-01-20 PROCEDURE — 71045 X-RAY EXAM CHEST 1 VIEW: CPT | Performed by: INTERNAL MEDICINE

## 2024-01-20 RX ORDER — SENNOSIDES A AND B 8.6 MG/1
8.6 TABLET, FILM COATED ORAL NIGHTLY
Status: SHIPPED | COMMUNITY
Start: 2024-01-20

## 2024-01-20 RX ORDER — PSEUDOEPHEDRINE HCL 30 MG
100 TABLET ORAL 2 TIMES DAILY
Status: SHIPPED | COMMUNITY
Start: 2024-01-20

## 2024-01-20 RX ORDER — LOSARTAN POTASSIUM 50 MG/1
50 TABLET ORAL 2 TIMES DAILY
Status: SHIPPED | COMMUNITY
Start: 2024-01-20

## 2024-01-20 RX ORDER — GABAPENTIN 100 MG/1
100 CAPSULE ORAL 3 TIMES DAILY PRN
Status: SHIPPED | COMMUNITY
Start: 2024-01-20

## 2024-01-20 RX ORDER — ALBUTEROL SULFATE 90 UG/1
2 AEROSOL, METERED RESPIRATORY (INHALATION) EVERY 4 HOURS PRN
Status: SHIPPED | COMMUNITY
Start: 2024-01-20

## 2024-01-20 RX ORDER — HYDROMORPHONE HYDROCHLORIDE 2 MG/1
2 TABLET ORAL EVERY 4 HOURS PRN
Qty: 20 TABLET | Refills: 0 | Status: SHIPPED | OUTPATIENT
Start: 2024-01-20

## 2024-01-20 RX ORDER — SIMVASTATIN 20 MG
20 TABLET ORAL NIGHTLY
Status: SHIPPED | COMMUNITY
Start: 2024-01-20

## 2024-01-20 RX ORDER — POLYETHYLENE GLYCOL 3350 17 G/17G
17 POWDER, FOR SOLUTION ORAL DAILY
Status: SHIPPED | COMMUNITY
Start: 2024-01-21

## 2024-01-20 RX ORDER — BISACODYL 10 MG
10 SUPPOSITORY, RECTAL RECTAL
Status: SHIPPED | COMMUNITY
Start: 2024-01-20

## 2024-01-20 RX ORDER — CEFADROXIL 500 MG/1
500 CAPSULE ORAL 2 TIMES DAILY
Status: SHIPPED | COMMUNITY
Start: 2024-01-21

## 2024-01-20 RX ORDER — BENZONATATE 100 MG/1
100 CAPSULE ORAL 3 TIMES DAILY PRN
Status: SHIPPED | COMMUNITY
Start: 2024-01-20

## 2024-01-20 NOTE — PROGRESS NOTES
Patient discharged to [facility] via ambulance transport.  Discharge education taught, patient verbalizes understanding.  Belongings sent with patient.  TLSO sent with patient.  Script sent with patient.  Report given to receiving RN.

## 2024-01-20 NOTE — PLAN OF CARE
Aox4, on room air , tolerating scheduled Paxlovid, on tele nsr, on Lovenox, voiding via purewick/br, up standby assist with RW, refusing TLSO brace, Zofran for nausea d/t coughing, repeat x-rays yesterday negative for new fxs, plan for kypho OP and back to [facility], no further needs at this time.

## 2024-01-20 NOTE — DISCHARGE SUMMARY
[facility] Internal Medicine Hospitalist Discharge Summary     Patient ID:  Emma Trevizo  83 year old  8/20/1940    Admit date: 1/12/2024    Discharge date and time: 1/20/2024    Attending Physician: Mike Cheek DO     Primary Care Physician: Cathy Martinez MD     Discharge Diagnoses:   Acute on Chronic back pain  Acute Closed T8 Compression Fracture, initial visit  Acute closed T9 compression fracture, subsequent visit  COVID-19 infection   Proteus mirabilis UTI  CAD  HTN  HLD    Please note that only [facility] DMG and EMG patients enrolled in the Medicare ACO, [facility] ACO and [facility] HMOs will be handled by the \[facility]\"" Care Management team.  For all other patients, please follow usual protocol for discharge care transition.    Discharge Condition: stable    Disposition:  [facility]    Important Follow up:  - PCP within 1 week of [facility] discharge   - Consults: None    Follow Up Items:  None      Hospital Course:      83 year old female who presents for evaluation of uncontrolled chronic back pain     Problem List / Diagnoses     Acute on Chronic back pain  Acute Closed T8 Compression Fracture, initial visit  Acute closed T9 compression fracture, subsequent visit  CAD  HTN  HL     Plan     Acute on Chronic back pain  Acute Closed T8 Compression Fracture, initial visit  Acute closed T9 compression fracture, subsequent visit  -No recent traumas or other inciting events that would suggest new injury  - Exam without evidence of neurologic impairment  - MRI shows new T8 endplate fracture  -- kypho to be done 1/15 delayed for 10 days due to COVID infection.  TLSO when up.  -s/p Toradol 50 mg every 6 hours as needed  - changed norco to dilaudid 2 mg po Q4 PRN for pain given that she is on paxovid (interacts with norco and percocet)  -PT/OT -> [facility]  - in discussion with IR about rescheduling kypho for 1/25 or 1/26 as outpt.  SW to be notified of the time, pt to go from [facility] to get  the procedure.   1/25/24  at 2 pm - SW notified.    - given reports of worsening back pain with cough, d/w IR, check XR of T and L spine to eval for new compression, neg for new fx.      COVID-19 infection   - no hypoxia or PNA on CXR  - started on paxlovid given risk for severe disease, course completed  - schedule tessalon, cont PRN robitussin      Proteus mirabilis UTI  - pt c/o urinary symptoms   - s/p empiric CTX x 4 days  - final C+S done  - discharge to [facility] on 1 more day of duricef     CAD  HTN  HL  -Stable, resume home meds  - amlodipine dose reduced while pt is on paxlovid, resume regular dose on discharge   - statin on hold as pt is on paxlovid, resume on discharge   - added PRN hydralazine and PRN labetalol for elevated BPs     Stable for discharge to [facility].     Consults: IP CONSULT TO HOSPITALIST  NURSING CONSULT TO DIETITIAN  IP CONSULT TO SOCIAL WORK  IP CONSULT TO PHARMACY    Operative Procedures:  None      Patient instructions:      I as the attending physician reconciled the current and discharge medications on day of discharge.        Medication List        START taking these medications      albuterol 108 (90 Base) MCG/ACT Aers  Commonly known as: Ventolin HFA     benzonatate 100 MG Caps  Commonly known as: Tessalon     bisacodyl 10 MG Supp  Commonly known as: Dulcolax     docusate sodium 100 MG Caps  Commonly known as: COLACE     HYDROmorphone 2 MG Tabs  Commonly known as: Dilaudid  Take 1 tablet (2 mg total) by mouth every 4 (four) hours as needed for Pain.     Polyethylene Glycol 3350 17 g Pack  Commonly known as: MIRALAX  Start taking on: January 21, 2024     sennosides 8.6 MG Tabs  Commonly known as: Senokot            CHANGE how you take these medications      gabapentin 100 MG Caps  Commonly known as: Neurontin  What changed:   when to take this  reasons to take this            CONTINUE taking these medications      acetaminophen 500 MG Tabs  Commonly known as: Tylenol Extra Strength      amLODIPine 10 MG Tabs  Commonly known as: Norvasc     baclofen 10 MG Tabs  Commonly known as: Lioresal     Calcium Citrate-Vitamin D 500-400 MG-UNIT Chew     cefadroxil 500 MG Caps  Commonly known as: DURICEF  Start taking on: January 21, 2024     Combigan 0.2-0.5 % Soln  Generic drug: Brimonidine Tartrate-Timolol  Place 1 drop into the left eye 2 (two) times daily.     folic acid 1 MG Tabs  Commonly known as: Folvite  Take 1 tablet (1 mg total) by mouth daily.     losartan 50 MG Tabs  Commonly known as: Cozaar     Methotrexate Sodium 5 MG Tabs     metoprolol succinate ER 25 MG Tb24  Commonly known as: Toprol XL     Netarsudil Dimesylate 0.02 % Soln  Commonly known as: Rhopressa     omeprazole 20 MG Cpdr  Commonly known as: PriLOSEC     simvastatin 20 MG Tabs  Commonly known as: Zocor     sulfaSALAzine 500 MG Tabs  Commonly known as: Azulfidine  Take 2 tablets (1,000 mg total) by mouth 2 (two) times daily.     Vitamin D 1000 units Caps            STOP taking these medications      ciprofloxacin 500 MG Tabs  Commonly known as: Cipro     HYDROcodone-acetaminophen 5-325 MG Tabs  Commonly known as: Norco     methylPREDNISolone 4 MG Tabs  Commonly known as: Medrol     oxyCODONE-acetaminophen 5-325 MG Tabs  Commonly known as: Percocet     Tylenol PM Extra Strength 500-25 MG Tabs  Generic drug: diphenhydrAMINE-APAP (sleep)               Where to Get Your Medications        You can get these medications from any pharmacy    Bring a paper prescription for each of these medications  HYDROmorphone 2 MG Tabs              Activity:  Up with assist  Diet: cardiac diet  Wound Care: as directed  Code Status: Full Code      Exam on day of discharge:     Vitals:    01/20/24 1148   BP: 115/41   Pulse: 58   Resp: 16   Temp: 98.3 °F (36.8 °C)       General: NAD, Nontoxic   Respiratory: CTAB; reg resp rate & effort, no wheezes/crackles  Cardiovascular: S1, S2. Regular rate and rhythm. No murmurs appreciated  Abdomen: Soft, NTND, no  guarding/rebound   Neurologic: No focal neurological deficits. Distal sensation intact to light touch  Extremities: No edema.   Skin: Dry, no rashes, ulcers or lesions          Total time coordinating care for discharge: Greater than 30 minutes    Mike Cheek DO  [facility] Hospitalist

## 2024-01-20 NOTE — DISCHARGE INSTRUCTIONS
Kyphoplasty  Kyphoplasty is a procedure that can help relieve the pain of vertebral compression fracture. This is a collapse of bone in your spine most commonly caused by osteoporosis. It does this by strengthening your spine (vertebrae) with special surgical cement. The procedure usually takes  30 to 45 minutes.   Preparing for the procedure  Tell your healthcare provider about all medicines you take. This includes over-the-counter medicines, herbs, vitamins, and other supplements.   Ask your healthcare provider if you should stop taking any medicines, such as blood thinners, before the procedure.  Follow any directions you are given for not eating or drinking before surgery.  Arrange for an adult family member or friend to drive you home.  During the procedure    Your healthcare provider will give you anesthesia. This is medicine to keep you from feeling pain during the procedure. During kyphoplasty:   Your surgeon makes 1 or more tiny incisions in your back.  Using live video X-ray images (fluoroscopy) as a guide, your surgeon inserts a hollow tube through the incision (cut) into the collapsed vertebra.  A small balloon is passed through the tube into the vertebra. There it's inflated to open a space.  Typically, the balloon is then removed, and the empty space is filled with special cement for bones.  Risks and possible complications  Kyphoplasty is considered safe. If complications do happen, they may include:   Spinal cord or nerve damage  Cement leakage in blood vessels, or against the spinal cord  Heart or lung problems  New or unrelieved back pain  Infection  Allergy to the cement (rare)  Fractures of the neighboring vertebrae  After the procedure  You will be sent to a recovery room after the procedure. Usually, you will go home later the same day. Or, you may stay the night in a hospital room. Once you’re ready to go home, your healthcare provider will tell you how to care for yourself.   When to call  your healthcare provider  Call your healthcare provider right away if any of thefollowing occur:   Fever of  100.4° F ( 38.0°C)  or higher, or as directed by your healthcare provider  New pain, weakness, tingling, or numbness in your legs  New or unrelieved back pain  [employer] last reviewed this educational content on 3/1/2022  © 2152-3865 [employer]. All rights reserved. This information is not intended as a substitute for professional medical care. Always follow your healthcare professional's instructions.

## 2024-01-20 NOTE — PLAN OF CARE
A&Ox4. VSS. On 3L via nasal cannula. . IS encouraged. Telemetry monitoring - NSR. SCDs on BLE. Ankle pumps encouraged. Tolerating regular diet. Last BM 1/19. Voiding freely via purewick. Pain controlled. Ambulating with standby assist. Plan is to dc to [facility] when medically clear. Patient updated on plan of care. Safety precautions in place. Call light within reach.

## 2024-01-20 NOTE — CM/SW NOTE
01/20/24 1200   Discharge disposition   Expected discharge disposition subacute   Post Acute Care Provider [facility]   Discharge transportation [facility]     RN informed SW pt is cleared for DC today. Doris from [facility] stated they'll be ready for pt at 1500.     MONICO arranged BLS Ambulance via [facility], for 1500  to [facility]. Pt inquired to RN about [facility] transport. Marina from [facility] stated that d/t COVID+ isolation and not having 5 full days of quarantine since positive date, will require ambulance transport.     PCS form completed. Left voicemail for pt's spouse.     RN to call report to [facility] at [phone], ask for 2nd floor nurse's station     Hilaria Monahan, LSW - [id number]

## 2024-01-23 ENCOUNTER — INITIAL APN SNF VISIT (OUTPATIENT)
Dept: INTERNAL MEDICINE CLINIC | Age: 84
End: 2024-01-23

## 2024-01-23 DIAGNOSIS — Z28.21 INFLUENZA VACCINE REFUSED: Primary | ICD-10-CM

## 2024-01-23 DIAGNOSIS — R29.898 SEVERE MUSCLE DECONDITIONING: ICD-10-CM

## 2024-01-23 DIAGNOSIS — U07.1 COVID-19: ICD-10-CM

## 2024-01-23 DIAGNOSIS — G89.21 CHRONIC PAIN DUE TO TRAUMA: ICD-10-CM

## 2024-01-23 DIAGNOSIS — K59.03 DRUG-INDUCED CONSTIPATION: ICD-10-CM

## 2024-01-23 DIAGNOSIS — M45.0 ANKYLOSING SPONDYLITIS OF MULTIPLE SITES IN SPINE (HCC): ICD-10-CM

## 2024-01-23 DIAGNOSIS — I10 ESSENTIAL HYPERTENSION: ICD-10-CM

## 2024-01-23 DIAGNOSIS — S22.060D CLOSED WEDGE COMPRESSION FRACTURE OF T8 VERTEBRA WITH ROUTINE HEALING, SUBSEQUENT ENCOUNTER: ICD-10-CM

## 2024-01-23 DIAGNOSIS — S22.070G CLOSED WEDGE COMPRESSION FRACTURE OF T9 VERTEBRA WITH DELAYED HEALING, SUBSEQUENT ENCOUNTER: ICD-10-CM

## 2024-01-23 PROCEDURE — 1159F MED LIST DOCD IN RCRD: CPT | Performed by: NURSE PRACTITIONER

## 2024-01-23 PROCEDURE — 1111F DSCHRG MED/CURRENT MED MERGE: CPT | Performed by: NURSE PRACTITIONER

## 2024-01-23 PROCEDURE — 1160F RVW MEDS BY RX/DR IN RCRD: CPT | Performed by: NURSE PRACTITIONER

## 2024-01-23 PROCEDURE — 99310 SBSQ NF CARE HIGH MDM 45: CPT | Performed by: NURSE PRACTITIONER

## 2024-01-23 PROCEDURE — 3075F SYST BP GE 130 - 139MM HG: CPT | Performed by: NURSE PRACTITIONER

## 2024-01-23 PROCEDURE — 3078F DIAST BP <80 MM HG: CPT | Performed by: NURSE PRACTITIONER

## 2024-01-23 NOTE — PAYOR COMM NOTE
--------------  DISCHARGE REVIEW    Payor: [employer]  Subscriber #:  [id number]  Authorization Number: [id number]    Admit date: 1/12/24  Admit time:  11:26 PM  Discharge Date: 1/20/2024  3:46 PM     Admitting Physician: Karely Eddy MD  Attending Physician:  No att. providers found  Primary Care Physician: Cathy Martinez MD          Discharge Summary Notes        Discharge Summary signed by Mike Cheek DO at 1/20/2024 12:47 PM       Author: Mike Cheek DO Specialty: Internal Medicine Author Type: Physician    Filed: 1/20/2024 12:47 PM Date of Service: 1/20/2024 12:34 PM Status: Addendum    : Mike Cheek DO (Physician)    Related Notes: Original Note by Mike Cheek DO (Physician) filed at 1/20/2024 12:46 PM                                                          [facility] Internal Medicine Hospitalist Discharge Summary     Patient ID:  Emma Trevizo  83 year old  8/20/1940    Admit date: 1/12/2024    Discharge date and time: 1/20/2024    Attending Physician: Mike Cheek DO     Primary Care Physician: Cathy Martinez MD     Discharge Diagnoses:   Acute on Chronic back pain  Acute Closed T8 Compression Fracture, initial visit  Acute closed T9 compression fracture, subsequent visit  COVID-19 infection   Proteus mirabilis UTI  CAD  HTN  HLD    Please note that only IHP DMG and EMG patients enrolled in the Medicare ACO, [facility] ACO and [facility] HMOs will be handled by the [facility] Care Management team.  For all other patients, please follow usual protocol for discharge care transition.    Discharge Condition: stable    Disposition:  VERÓNICA    Important Follow up:  - PCP within 1 week of VERÓNICA discharge   - Consults: None    Follow Up Items:  None      Hospital Course:      83 year old female who presents for evaluation of uncontrolled chronic back pain     Problem List / Diagnoses     Acute on Chronic back pain  Acute Closed T8 Compression Fracture, initial  visit  Acute closed T9 compression fracture, subsequent visit  CAD  HTN  HL     Plan     Acute on Chronic back pain  Acute Closed T8 Compression Fracture, initial visit  Acute closed T9 compression fracture, subsequent visit  -No recent traumas or other inciting events that would suggest new injury  - Exam without evidence of neurologic impairment  - MRI shows new T8 endplate fracture  -- kypho to be done 1/15 delayed for 10 days due to COVID infection.  TLSO when up.  -s/p Toradol 50 mg every 6 hours as needed  - changed norco to dilaudid 2 mg po Q4 PRN for pain given that she is on paxovid (interacts with norco and percocet)  -PT/OT -> VERÓNICA  - in discussion with IR about rescheduling kypho for 1/25 or 1/26 as outpt.  SW to be notified of the time, pt to go from [facility] to get the procedure.   1/25/24  at 2 pm - SW notified.    - given reports of worsening back pain with cough, d/w IR, check XR of T and L spine to eval for new compression, neg for new fx.      COVID-19 infection   - no hypoxia or PNA on CXR  - started on paxlovid given risk for severe disease, course completed  - schedule tessalon, cont PRN robitussin      Proteus mirabilis UTI  - pt c/o urinary symptoms   - s/p empiric CTX x 4 days  - final C+S done  - discharge to [facility] on 1 more day of duricef     CAD  HTN  HL  -Stable, resume home meds  - amlodipine dose reduced while pt is on paxlovid, resume regular dose on discharge   - statin on hold as pt is on paxlovid, resume on discharge   - added PRN hydralazine and PRN labetalol for elevated BPs     Stable for discharge to [facility].     Consults: IP CONSULT TO HOSPITALIST  NURSING CONSULT TO DIETITIAN  IP CONSULT TO SOCIAL WORK  IP CONSULT TO PHARMACY    Operative Procedures:  None      Patient instructions:      I as the attending physician reconciled the current and discharge medications on day of discharge.        Medication List        START taking these medications      albuterol 108 (90 Base) MCG/ACT  Aers  Commonly known as: Ventolin HFA     benzonatate 100 MG Caps  Commonly known as: Tessalon     bisacodyl 10 MG Supp  Commonly known as: Dulcolax     docusate sodium 100 MG Caps  Commonly known as: COLACE     HYDROmorphone 2 MG Tabs  Commonly known as: Dilaudid  Take 1 tablet (2 mg total) by mouth every 4 (four) hours as needed for Pain.     Polyethylene Glycol 3350 17 g Pack  Commonly known as: MIRALAX  Start taking on: January 21, 2024     sennosides 8.6 MG Tabs  Commonly known as: Senokot            CHANGE how you take these medications      gabapentin 100 MG Caps  Commonly known as: Neurontin  What changed:   when to take this  reasons to take this            CONTINUE taking these medications      acetaminophen 500 MG Tabs  Commonly known as: Tylenol Extra Strength     amLODIPine 10 MG Tabs  Commonly known as: Norvasc     baclofen 10 MG Tabs  Commonly known as: Lioresal     Calcium Citrate-Vitamin D 500-400 MG-UNIT Chew     cefadroxil 500 MG Caps  Commonly known as: DURICEF  Start taking on: January 21, 2024     Combigan 0.2-0.5 % Soln  Generic drug: Brimonidine Tartrate-Timolol  Place 1 drop into the left eye 2 (two) times daily.     folic acid 1 MG Tabs  Commonly known as: Folvite  Take 1 tablet (1 mg total) by mouth daily.     losartan 50 MG Tabs  Commonly known as: Cozaar     Methotrexate Sodium 5 MG Tabs     metoprolol succinate ER 25 MG Tb24  Commonly known as: Toprol XL     Netarsudil Dimesylate 0.02 % Soln  Commonly known as: Rhopressa     omeprazole 20 MG Cpdr  Commonly known as: PriLOSEC     simvastatin 20 MG Tabs  Commonly known as: Zocor     sulfaSALAzine 500 MG Tabs  Commonly known as: Azulfidine  Take 2 tablets (1,000 mg total) by mouth 2 (two) times daily.     Vitamin D 1000 units Caps            STOP taking these medications      ciprofloxacin 500 MG Tabs  Commonly known as: Cipro     HYDROcodone-acetaminophen 5-325 MG Tabs  Commonly known as: Norco     methylPREDNISolone 4 MG Tabs  Commonly  known as: Medrol     oxyCODONE-acetaminophen 5-325 MG Tabs  Commonly known as: Percocet     Tylenol PM Extra Strength 500-25 MG Tabs  Generic drug: diphenhydrAMINE-APAP (sleep)               Where to Get Your Medications        You can get these medications from any pharmacy    Bring a paper prescription for each of these medications  HYDROmorphone 2 MG Tabs              Activity:  Up with assist  Diet: cardiac diet  Wound Care: as directed  Code Status: Full Code      Exam on day of discharge:     Vitals:    01/20/24 1148   BP: 115/41   Pulse: 58   Resp: 16   Temp: 98.3 °F (36.8 °C)       General: NAD, Nontoxic   Respiratory: CTAB; reg resp rate & effort, no wheezes/crackles  Cardiovascular: S1, S2. Regular rate and rhythm. No murmurs appreciated  Abdomen: Soft, NTND, no guarding/rebound   Neurologic: No focal neurological deficits. Distal sensation intact to light touch  Extremities: No edema.   Skin: Dry, no rashes, ulcers or lesions          Total time coordinating care for discharge: Greater than 30 minutes    Mike Cheek DO  [facility]       Electronically signed by Mike Cheek DO on 1/20/2024 12:47 PM         REVIEWER COMMENTS

## 2024-01-24 VITALS
TEMPERATURE: 98 F | OXYGEN SATURATION: 97 % | SYSTOLIC BLOOD PRESSURE: 136 MMHG | HEART RATE: 79 BPM | RESPIRATION RATE: 20 BRPM | DIASTOLIC BLOOD PRESSURE: 75 MMHG | WEIGHT: 156 LBS | BODY MASS INDEX: 25 KG/M2

## 2024-01-24 NOTE — PROGRESS NOTES
Emma Trevizo.  1940. APN Initial Encounter 24 730pm    Met with Mrs. Trevizo at bedside. Patient c/o chronic mid back pain.  She is very concerned about her upcoming kyphoplasty scheduled for 24 at [facility].   Patient reports she does not want to miss this procedure d/t COVID symptoms. Duoneb treatments, Robitussin DM, Albuterol and completed Duricef today 24.   STACXR ordered 24 with results:  Heart WNL; No active infiltrates.    Confirmed methotrexate order with Dr. Yusuf Rosado . Take 2.5 mg tablets (20mg) 8 tablets every Monday.    Emma Trevizo  : 1940.  83 year old  female is admitted to [facility] [place] [place] for rehabilitation and strengthening.      [facility] Admission:        24  Discharged to [facility]:  24    Chief complaint: uncontrolled chronic back pain, no injury    DISCHARGE DIAGNOSES:  Acute on chronic back pain.  Acute closed T-8 compression Fx  Acute closed T9 compression fx, subsequent tx  COVID 19 infection  UTI proteus mirabilis  CAD  HTN   HLD    HPI  83 yr old female presented to the ED d/t back pain, abdominal pain with vomiting. Pt reported she had a recent T9 compression fx and prescribed pain medications.  Pt states she has been confused about her medications; typically has someone to help w/meds but currently not available.  Patient was last seen in the ED 4 days ago d/t exacerbation of chronic back pain.  Xray did not show any acute fx.  She was discharged on [place]. She reports increasing her Norco and had baclofen and steroids added per PCP.  Yesterday prior to admission, Norco was changed to Oxycodone. Miralax was added d/t constipation., Patient was admitted for chronic pain.    Past Medical History:   Diagnosis Date    Ankylosing spondylitis (HCC)     BACK SHOULDERS ARMS- receives an infusion  every 2 mos    Aortic atherosclerosis (HCC) 2018    Chart review.  Noted on CT 3/1/18.    Arrhythmia      PALPATATIONS    Arteritis, unspecified (HCC)     Left     Back problem     Cataract     Bilateral    Cholelithiasis     Collagenous colitis 02/2010    Diffuse cystic mastopathy     Disorder of bone and cartilage, unspecified     osteopenia    Elevated blood pressure reading without diagnosis of hypertension     Esophageal reflux     Glaucoma     left eye    High blood pressure     High cholesterol     Indeterminate PPD     Father had TB, seen by Dr. Ott, treated for 9 months    Intestinal disaccharidase deficiencies and disaccharide malabsorption     Muscle weakness     USES CANE    Nausea and vomiting, unspecified vomiting type 01/12/2024    Other and unspecified hyperlipidemia     Problems with swallowing     COFFEE WATER COUGHING SPELL AFTER TAKING    Rheumatoid arthritis and other inflammatory polyarthropathies     RA    Thoracic vertebral fracture (HCC)     Visual impairment     iritis     Past Surgical History:   Procedure Laterality Date    APPENDECTOMY      BIOPSY OF BREAST, INCISIONAL      L Breast    CATARACT      CHOLECYSTECTOMY  2005    COLONOSCOPY,BIOPSY  02/13/2006    nonspecific erythema transverse colon (bx marked acute & chronic colitis), ileum wnl    COLONOSCOPY,DIAGNOSTIC  1990    mild inflammatory change sigmoid colon    COLONOSCOPY,DIAGNOSTIC  02/03/2010    Dr. Rodarte, mild erythema of the rectosigmoid, possibly a variant of normal, bx show collagenous colitis, ileum normal    EGD N/A 08/09/2021    Procedure: ESOPHAGOGASTRODUODENOSCOPY WITH DILATION with bxs, COLONOSCOPY with bxs;  Surgeon: Yaya Cook MD;  Location: [facility], [facility]    HEMORRHOIDECTOMY,INT/EXT,SIMPLE      NEEDLE BIOPSY LEFT      pt cannot remember    NEEDLE BIOPSY RIGHT      pt cannot remember    OTHER AMBL SURG  2005    R leg tendon surgery with post-op wound infection    OTHER SURGICAL HISTORY      right ankle surgery    OTHER SURGICAL HISTORY  05/07/2013     left 1st metatarsophalangeal fusion. Weil  osteotomy 2nd metatarsal joint and metatarsophalgeal capsulotomy and extensor digitorum longus lengthening ans 2nd hammer toe. 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    SKIN SURGERY Left 11/19/2015    ESC of SCC to left lateral malleolus    SKIN SURGERY  03/29/2022    BCC- left posterior postauricular neck, MMS     TOTAL ABDOM HYSTERECTOMY      w/BSO    UP GI ENDOSCOPY,BALL DIL,30MM  05/04/2009    UP GI ENDOSCOPY,BALL DIL,30MM  06/12/2014    distal esophageal stricture, bx and dilated 18-20 mm    UPPER GI ENDOSCOPY,BIOPSY  05/04/2009    antral gastric erosions, distal esophageal stricture, gastric bx, esophageal dilation 18-20 mm, Performed by RACHEL GLEASON at [facility], Performed by RACHEL GLEASON at [facility]    UPPER GI ENDOSCOPY,BIOPSY  02/2010    3 gastric ulcers from diclofenac, bx for H. pylori was negative    UPPER GI ENDOSCOPY,DIAGNOSIS  1990    wnl     Family History   Problem Relation Age of Onset    Other (Other) Father         AAA, TB    Cancer Mother         uterus    Musculo-skelatal Disorder Brother         ankolosing spondylitis    Heart Disorder Brother         valve disorder/pacemaker    Musculo-skelatal Disorder Brother         ankylosing spondylitis    Other (viral headache) Brother      Social History     Socioeconomic History    Marital status:    Tobacco Use    Smoking status: Never    Smokeless tobacco: Never    Tobacco comments:     non-smoker   Vaping Use    Vaping Use: Never used   Substance and Sexual Activity    Alcohol use: Not Currently     Alcohol/week: 0.0 standard drinks of alcohol     Comment: \"rare\"    Drug use: No   Social History Narrative    : yes     Children: no    Exercise: walks    Employment: retired  @     Caffeine intake: moderate                 Social Determinants of Health     Food Insecurity: No Food Insecurity (1/13/2024)    Food Insecurity     Food Insecurity: Never true   Transportation Needs: Unmet Transportation Needs (1/13/2024)    Transportation Needs     Lack of Transportation: Yes   Physical Activity: Sufficiently Active (3/7/2022)    Exercise Vital Sign     Days of Exercise per Week: 6 days     Minutes of Exercise per Session: 30 min   Housing Stability: Low Risk  (1/13/2024)    Housing Stability     Housing Instability: No     IMMUNIZATIONS  Immunization History   Administered Date(s) Administered    >= 3 Yrs, Influenza Vaccine,(07944),Flu Clinic 10/13/2008, 10/07/2009    Covid-19 Vaccine Pfizer 30 mcg/0.3 ml 01/19/2021, 02/09/2021, 10/19/2021    FLU VAC High Dose 65 YRS & Older PRSV Free (80503) 09/02/2019    FLUAD High Dose 65 yr and older (38464) 10/12/2017    Fluzone Vaccine Medicare () 09/28/2015, 09/10/2016, 09/19/2018, 09/02/2019    HEP A 06/06/2008    HIGH DOSE FLU 65 YRS AND OLDER PRSV FREE SINGLE D (08814) FLU CLINIC 09/29/2015, 09/11/2016    Influenza 10/09/2007, 10/06/2011, 10/18/2013, 11/10/2014, 09/01/2016, 09/15/2019    Influenza Virus Vaccine, Split 09/28/2012    Pneumococcal (Prevnar 13) 12/03/2014    Pneumovax 11/17/2016    Pneumovax 23 11/16/2016    TDAP 06/06/2008   Deferred Date(s) Deferred    Influenza Vaccine Refused 03/07/2022      ALLERGIES:  Allergies   Allergen Reactions    Adhesive Tape HIVES and OTHER (SEE COMMENTS)     ZIOpatch adhesive rash    Latex UNKNOWN     Added based on information entered during case entry, please review and add reactions, type, and severity as needed     CODE STATUS:  Full Code    ADVANCED CARE PLANNING TEAM: Will need family care plan  mtgs to arrange a safe discharge.    CURRENT MEDICATIONS - reviewed and updated on [facility]  Duonebs  Robitussin DM  Albuterol  108/90 q 4 hrs  Benzoatate 100 mg prn  Dulcolax supp prn  Colace 100  Dilaudid 2 mg q 4 hrs prn  Miralax daily  Sennokot 8.6 daily  Tylenol 500 mg  Amlodipine 10 mg   Baclofen 10 mg   Ca+D 500/400 daily  Duricef  today 1/23/24  Combigan 0.2-0.5% 1 gtt L eye twice daily  Folic acid 1 mg daily  Losartan 50 mg daily  Methotrexate 2.5mg - 8 tabs = 20 mg every Monday (per Dr. Rosado)  Metoprolol Succ 25 mg twice daily  Netarsudil Dimesylate 0.02% - 1 gtt both eyes q hs  Omeprazole 20 mg daily  Simvastatin 20mg q hs  Sulfasalazine 500 mg (1000) twice daily  Vitamin D daily     SUBJECTIVE: denies chest pain. C/O cough and congestion. C/O back pain.  Denies n/v/d/c.     PHYSICAL EXAM: /75. P 79. RR 20. POx 97%. T 97.9. wgt 156#.  GENERAL HEALTH: well developed, well nourished female with chronic pain.  LINES, TUBES, DRAINS:  none  SKIN: warm, dry  WOUND: see wound assessment,   EYES: PERRLA, EOMI, sclera anicteric, conjunctiva normal; there is no nystagmus, no drainage from eyes  HENT: normocephalic; normal nose, no nasal drainage, mucous membranes pink, moist, pharynx no exudate, no visible cerumen.   NECK: supple  BREAST: deferred exam   RESPIRATORY: lungs clear  CARDIOVASCULAR RRR, rate 79  ABDOMEN:  normal active BS+, soft, nondistended; nonten  LYMPHATIC:no lymphedema  MUSCULOSKELETAL: tenderness of the thoracic T8 Fx      EXTREMITIES/VASCULAR: no edema  NEUROLOGIC:follows commands  PSYCHIATRIC: alert and oriented x 3; affect appropriate  anxious re: scheduled kyphoplasty 1/25/24    MEDICAL DECISION MAKING: patient makes her own decisions.    Lab Results: WBC 11.6. HGB 12. Plts 269.  Na 136. K 5.1 Cl 102. CO2 30. BUN 19. Creat 0.4. GFR >60    Assessment / Plan:    Acute closed T-8 compression Fx; recent T9 compression Fx  [facility] 1/25/24 - [facility]  Dilaudid 2 mg q 4 hrs prn  Gabapencin 100 tid  Baclofen 10mg tid  Tylenol prn  PT/OT    COVID 19 infection - congestion cough post COVID   Duonebs  scheduled  Robitussin DM  Albuterol 108/90 inhaler  Benzoatate 100 mg prn  Complete Duricef today 1/23    UTI proteus mirabilis - resolved    HTN  - Metoprolol 25 mg twice daily    HLD - Simvastatin 20 q hs    Ankylosing spondylitis  Methotrexate 2.5 mg tabs = (20mg) q Monday-confirmed w/Dr.Michael Rosado  F/U with Rheumatologist - Dr. Rosado  - [phone]  PT/OT    Constipation  Colace  Sennokot   Miralax    Weakness/deconditioning  PT/OT    FOLLOW UP APPOINTMENTS   *Follow-Up with PCP within 1-2 weeks following VERÓNICA discharge.   *Follow-Up with specialists as recommended.]  *Kyphoplasty 1/25/24 [facility]    Future Appointments   Date Time Provider Department Center   2/1/2024  9:30 AM  [facility]     *Greater than 45 minutes spent w/ patient and staff, including but not limited to/ reviewing present status, needs, abilities with disciplines, reviewing medical records, vital signs, labs, completing med rec and entering orders to establish plan of care in [facility].  Note to patient: The 21st Century Cures Act makes medical notes like these available to patients in the interest of transparency. However, this is a medical document intended as peer to peer communication. It is written in medical language and may contain abbreviations or verbiage that are unfamiliar. It may appear blunt or direct. Medical documents are intended to carry relevant information, facts as evident, and the clinical opinion of the practitioner who signs the document.    CLOVIS Morris (Daya)  01/23/24 7:30 pm  EE Post Acute Care Team

## 2024-01-24 NOTE — H&P (VIEW-ONLY)
Emma Trevizo.  1940. APN Initial Encounter 24 730pm    Met with Mrs. Trevizo at bedside. Patient c/o chronic mid back pain.  She is very concerned about her upcoming kyphoplasty scheduled for 24 at [facility].   Patient reports she does not want to miss this procedure d/t COVID symptoms. Duoneb treatments, Robitussin DM, Albuterol and completed Duricef today 24.   STACXR ordered 24 with results:  Heart WNL; No active infiltrates.    Confirmed methotrexate order with Dr. Yusuf Rosado . Take 2.5 mg tablets (20mg) 8 tablets every Monday.    Emma Trevizo  : 1940.  83 year old  female is admitted to [facility] for rehabilitation and strengthening.      [facility] Admission:        24  Discharged to [facility]:  24    Chief complaint: uncontrolled chronic back pain, no injury    DISCHARGE DIAGNOSES:  Acute on chronic back pain.  Acute closed T-8 compression Fx  Acute closed T9 compression fx, subsequent tx  COVID 19 infection  UTI proteus mirabilis  CAD  HTN   HLD    HPI  83 yr old female presented to the ED d/t back pain, abdominal pain with vomiting. Pt reported she had a recent T9 compression fx and prescribed pain medications.  Pt states she has been confused about her medications; typically has someone to help w/meds but currently not available.  Patient was last seen in the ED 4 days ago d/t exacerbation of chronic back pain.  Xray did not show any acute fx.  She was discharged on [place]. She reports increasing her Norco and had baclofen and steroids added per PCP.  Yesterday prior to admission, Norco was changed to Oxycodone. Miralax was added d/t constipation., Patient was admitted for chronic pain.    Past Medical History:   Diagnosis Date    Ankylosing spondylitis (HCC)     BACK SHOULDERS ARMS- receives an infusion  every 2 mos    Aortic atherosclerosis (HCC) 2018    Chart review.  Noted on CT 3/1/18.    Arrhythmia      PALPATATIONS    Arteritis, unspecified (HCC)     Left     Back problem     Cataract     Bilateral    Cholelithiasis     Collagenous colitis 02/2010    Diffuse cystic mastopathy     Disorder of bone and cartilage, unspecified     osteopenia    Elevated blood pressure reading without diagnosis of hypertension     Esophageal reflux     Glaucoma     left eye    High blood pressure     High cholesterol     Indeterminate PPD     Father had TB, seen by Dr. Ott, treated for 9 months    Intestinal disaccharidase deficiencies and disaccharide malabsorption     Muscle weakness     USES CANE    Nausea and vomiting, unspecified vomiting type 01/12/2024    Other and unspecified hyperlipidemia     Problems with swallowing     COFFEE WATER COUGHING SPELL AFTER TAKING    Rheumatoid arthritis and other inflammatory polyarthropathies     RA    Thoracic vertebral fracture (HCC)     Visual impairment     iritis     Past Surgical History:   Procedure Laterality Date    APPENDECTOMY      BIOPSY OF BREAST, INCISIONAL      L Breast    CATARACT      CHOLECYSTECTOMY  2005    COLONOSCOPY,BIOPSY  02/13/2006    nonspecific erythema transverse colon (bx marked acute & chronic colitis), ileum wnl    COLONOSCOPY,DIAGNOSTIC  1990    mild inflammatory change sigmoid colon    COLONOSCOPY,DIAGNOSTIC  02/03/2010    Dr. Rodarte, mild erythema of the rectosigmoid, possibly a variant of normal, bx show collagenous colitis, ileum normal    EGD N/A 08/09/2021    Procedure: ESOPHAGOGASTRODUODENOSCOPY WITH DILATION with bxs, COLONOSCOPY with bxs;  Surgeon: Yaya Cook MD;  Location: [facility], [facility]    HEMORRHOIDECTOMY,INT/EXT,SIMPLE      NEEDLE BIOPSY LEFT      pt cannot remember    NEEDLE BIOPSY RIGHT      pt cannot remember    OTHER AMBL SURG  2005    R leg tendon surgery with post-op wound infection    OTHER SURGICAL HISTORY      right ankle surgery    OTHER SURGICAL HISTORY  05/07/2013     left 1st metatarsophalangeal fusion. Weil  osteotomy 2nd metatarsal joint and metatarsophalgeal capsulotomy and extensor digitorum longus lengthening ans 2nd hammer toe. 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    SKIN SURGERY Left 11/19/2015    ESC of SCC to left lateral malleolus    SKIN SURGERY  03/29/2022    BCC- left posterior postauricular neck, MMS     TOTAL ABDOM HYSTERECTOMY      w/BSO    UP GI ENDOSCOPY,BALL DIL,30MM  05/04/2009    UP GI ENDOSCOPY,BALL DIL,30MM  06/12/2014    distal esophageal stricture, bx and dilated 18-20 mm    UPPER GI ENDOSCOPY,BIOPSY  05/04/2009    antral gastric erosions, distal esophageal stricture, gastric bx, esophageal dilation 18-20 mm, Performed by RACHEL GLEASON at [facility], Performed by RACHEL GLEASON at [facility]    UPPER GI ENDOSCOPY,BIOPSY  02/2010    3 gastric ulcers from diclofenac, bx for H. pylori was negative    UPPER GI ENDOSCOPY,DIAGNOSIS  1990    wnl     Family History   Problem Relation Age of Onset    Other (Other) Father         AAA, TB    Cancer Mother         uterus    Musculo-skelatal Disorder Brother         ankolosing spondylitis    Heart Disorder Brother         valve disorder/pacemaker    Musculo-skelatal Disorder Brother         ankylosing spondylitis    Other (viral headache) Brother      Social History     Socioeconomic History    Marital status:    Tobacco Use    Smoking status: Never    Smokeless tobacco: Never    Tobacco comments:     non-smoker   Vaping Use    Vaping Use: Never used   Substance and Sexual Activity    Alcohol use: Not Currently     Alcohol/week: 0.0 standard drinks of alcohol     Comment: \"rare\"    Drug use: No   Social History Narrative    : yes     Children: no    Exercise: walks    Employment: retired  @     Caffeine intake: moderate                 Social Determinants of Health     Food Insecurity: No Food Insecurity (1/13/2024)    Food Insecurity     Food Insecurity: Never true   Transportation Needs: Unmet Transportation Needs (1/13/2024)    Transportation Needs     Lack of Transportation: Yes   Physical Activity: Sufficiently Active (3/7/2022)    Exercise Vital Sign     Days of Exercise per Week: 6 days     Minutes of Exercise per Session: 30 min   Housing Stability: Low Risk  (1/13/2024)    Housing Stability     Housing Instability: No     IMMUNIZATIONS  Immunization History   Administered Date(s) Administered    >= 3 Yrs, Influenza Vaccine,(84121),Flu Clinic 10/13/2008, 10/07/2009    Covid-19 Vaccine Pfizer 30 mcg/0.3 ml 01/19/2021, 02/09/2021, 10/19/2021    FLU VAC High Dose 65 YRS & Older PRSV Free (62427) 09/02/2019    FLUAD High Dose 65 yr and older (73876) 10/12/2017    Fluzone Vaccine Medicare () 09/28/2015, 09/10/2016, 09/19/2018, 09/02/2019    HEP A 06/06/2008    HIGH DOSE FLU 65 YRS AND OLDER PRSV FREE SINGLE D (95785) FLU CLINIC 09/29/2015, 09/11/2016    Influenza 10/09/2007, 10/06/2011, 10/18/2013, 11/10/2014, 09/01/2016, 09/15/2019    Influenza Virus Vaccine, Split 09/28/2012    Pneumococcal (Prevnar 13) 12/03/2014    Pneumovax 11/17/2016    Pneumovax 23 11/16/2016    TDAP 06/06/2008   Deferred Date(s) Deferred    Influenza Vaccine Refused 03/07/2022      ALLERGIES:  Allergies   Allergen Reactions    Adhesive Tape HIVES and OTHER (SEE COMMENTS)     ZIOpatch adhesive rash    Latex UNKNOWN     Added based on information entered during case entry, please review and add reactions, type, and severity as needed     CODE STATUS:  Full Code    ADVANCED CARE PLANNING TEAM: Will need family care plan  mtgs to arrange a safe discharge.    CURRENT MEDICATIONS - reviewed and updated on [facility]  Duonebs  Robitussin DM  Albuterol  108/90 q 4 hrs  Benzoatate 100 mg prn  Dulcolax supp prn  Colace 100  Dilaudid 2 mg q 4 hrs prn  Miralax daily  Sennokot 8.6 daily  Tylenol 500 mg  Amlodipine 10 mg   Baclofen 10 mg   Ca+D 500/400 daily  Duricef  today 1/23/24  Combigan 0.2-0.5% 1 gtt L eye twice daily  Folic acid 1 mg daily  Losartan 50 mg daily  Methotrexate 2.5mg - 8 tabs = 20 mg every Monday (per Dr. Rosado)  Metoprolol Succ 25 mg twice daily  Netarsudil Dimesylate 0.02% - 1 gtt both eyes q hs  Omeprazole 20 mg daily  Simvastatin 20mg q hs  Sulfasalazine 500 mg (1000) twice daily  Vitamin D daily     SUBJECTIVE: denies chest pain. C/O cough and congestion. C/O back pain.  Denies n/v/d/c.     PHYSICAL EXAM: /75. P 79. RR 20. POx 97%. T 97.9. wgt 156#.  GENERAL HEALTH: well developed, well nourished female with chronic pain.  LINES, TUBES, DRAINS:  none  SKIN: warm, dry  WOUND: see wound assessment,   EYES: PERRLA, EOMI, sclera anicteric, conjunctiva normal; there is no nystagmus, no drainage from eyes  HENT: normocephalic; normal nose, no nasal drainage, mucous membranes pink, moist, pharynx no exudate, no visible cerumen.   NECK: supple  BREAST: deferred exam   RESPIRATORY: lungs clear  CARDIOVASCULAR RRR, rate 79  ABDOMEN:  normal active BS+, soft, nondistended; nonten  LYMPHATIC:no lymphedema  MUSCULOSKELETAL: tenderness of the thoracic T8 Fx      EXTREMITIES/VASCULAR: no edema  NEUROLOGIC:follows commands  PSYCHIATRIC: alert and oriented x 3; affect appropriate  anxious re: scheduled kyphoplasty 1/25/24    MEDICAL DECISION MAKING: patient makes her own decisions.    Lab Results: WBC 11.6. HGB 12. Plts 269.  Na 136. K 5.1 Cl 102. CO2 30. BUN 19. Creat 0.4. GFR >60    Assessment / Plan:    Acute closed T-8 compression Fx; recent T9 compression Fx  Kyphoplasty 1/25/24 - [facility]  Dilaudid 2 mg q 4 hrs prn  Gabapencin 100 tid  Baclofen 10mg tid  Tylenol prn  PT/OT    COVID 19 infection - congestion cough post COVID   Duonebs  scheduled  Robitussin DM  Albuterol 108/90 inhaler  Benzoatate 100 mg prn  Complete Duricef today 1/23    UTI proteus mirabilis - resolved    HTN  - Metoprolol 25 mg twice daily    HLD - Simvastatin 20 q hs    Ankylosing spondylitis  Methotrexate 2.5 mg tabs = (20mg) q Monday-confirmed w/Dr.Michael Rosado  F/U with Rheumatologist - Dr. Rosado  - [phone]  PT/OT    Constipation  Colace  Sennokot   Miralax    Weakness/deconditioning  PT/OT    FOLLOW UP APPOINTMENTS   *Follow-Up with PCP within 1-2 weeks following VERÓNICA discharge.   *Follow-Up with specialists as recommended.]  *Kyphoplasty 1/25/24 [facility]    Future Appointments   Date Time Provider Department Center   2/1/2024  9:30 AM  [facility]     *Greater than 45 minutes spent w/ patient and staff, including but not limited to/ reviewing present status, needs, abilities with disciplines, reviewing medical records, vital signs, labs, completing med rec and entering orders to establish plan of care in [facility].  Note to patient: The 21st Century Cures Act makes medical notes like these available to patients in the interest of transparency. However, this is a medical document intended as peer to peer communication. It is written in medical language and may contain abbreviations or verbiage that are unfamiliar. It may appear blunt or direct. Medical documents are intended to carry relevant information, facts as evident, and the clinical opinion of the practitioner who signs the document.    CLOVIS Morris (Daya)  01/23/24 7:30 pm  [facility] Post Acute Care Team

## 2024-02-01 ENCOUNTER — HOSPITAL ENCOUNTER (OUTPATIENT)
Dept: INTERVENTIONAL RADIOLOGY/VASCULAR | Facility: HOSPITAL | Age: 84
Discharge: NURSING, REHAB OR LONG-TERM CARE | End: 2024-02-01
Attending: INTERNAL MEDICINE | Admitting: INTERNAL MEDICINE
Payer: MEDICARE

## 2024-02-01 VITALS
SYSTOLIC BLOOD PRESSURE: 138 MMHG | RESPIRATION RATE: 19 BRPM | HEART RATE: 81 BPM | DIASTOLIC BLOOD PRESSURE: 48 MMHG | OXYGEN SATURATION: 94 % | TEMPERATURE: 98 F

## 2024-02-01 DIAGNOSIS — S22.081G BURST FRACTURE OF T12 VERTEBRA WITH DELAYED HEALING: ICD-10-CM

## 2024-02-01 LAB
INR CARTRIDGE LOT #: NORMAL
INR: 1 (ref 0.8–1.3)

## 2024-02-01 PROCEDURE — 85610 PROTHROMBIN TIME: CPT | Performed by: STUDENT IN AN ORGANIZED HEALTH CARE EDUCATION/TRAINING PROGRAM

## 2024-02-01 PROCEDURE — 0PS43ZZ REPOSITION THORACIC VERTEBRA, PERCUTANEOUS APPROACH: ICD-10-PCS | Performed by: STUDENT IN AN ORGANIZED HEALTH CARE EDUCATION/TRAINING PROGRAM

## 2024-02-01 PROCEDURE — 0PU43JZ SUPPLEMENT THORACIC VERTEBRA WITH SYNTHETIC SUBSTITUTE, PERCUTANEOUS APPROACH: ICD-10-PCS | Performed by: STUDENT IN AN ORGANIZED HEALTH CARE EDUCATION/TRAINING PROGRAM

## 2024-02-01 PROCEDURE — 22513 PERQ VERTEBRAL AUGMENTATION: CPT | Performed by: STUDENT IN AN ORGANIZED HEALTH CARE EDUCATION/TRAINING PROGRAM

## 2024-02-01 PROCEDURE — 99152 MOD SED SAME PHYS/QHP 5/>YRS: CPT | Performed by: STUDENT IN AN ORGANIZED HEALTH CARE EDUCATION/TRAINING PROGRAM

## 2024-02-01 PROCEDURE — 99153 MOD SED SAME PHYS/QHP EA: CPT | Performed by: STUDENT IN AN ORGANIZED HEALTH CARE EDUCATION/TRAINING PROGRAM

## 2024-02-01 RX ORDER — NALOXONE HYDROCHLORIDE 0.4 MG/ML
80 INJECTION, SOLUTION INTRAMUSCULAR; INTRAVENOUS; SUBCUTANEOUS AS NEEDED
Status: DISCONTINUED | OUTPATIENT
Start: 2024-02-01 | End: 2024-02-01 | Stop reason: HOSPADM

## 2024-02-01 RX ORDER — CEFAZOLIN SODIUM/WATER 2 G/20 ML
SYRINGE (ML) INTRAVENOUS
Status: COMPLETED
Start: 2024-02-01 | End: 2024-02-01

## 2024-02-01 RX ORDER — FLUMAZENIL 0.1 MG/ML
0.2 INJECTION INTRAVENOUS AS NEEDED
Status: DISCONTINUED | OUTPATIENT
Start: 2024-02-01 | End: 2024-02-01 | Stop reason: HOSPADM

## 2024-02-01 RX ORDER — LIDOCAINE HYDROCHLORIDE 10 MG/ML
INJECTION, SOLUTION INFILTRATION; PERINEURAL
Status: COMPLETED
Start: 2024-02-01 | End: 2024-02-01

## 2024-02-01 RX ORDER — MIDAZOLAM HYDROCHLORIDE 1 MG/ML
INJECTION INTRAMUSCULAR; INTRAVENOUS
Status: COMPLETED
Start: 2024-02-01 | End: 2024-02-01

## 2024-02-01 RX ORDER — SODIUM CHLORIDE 9 MG/ML
INJECTION, SOLUTION INTRAVENOUS CONTINUOUS
Status: DISCONTINUED | OUTPATIENT
Start: 2024-02-01 | End: 2024-02-01

## 2024-02-01 RX ORDER — MIDAZOLAM HYDROCHLORIDE 1 MG/ML
1 INJECTION INTRAMUSCULAR; INTRAVENOUS EVERY 5 MIN PRN
Status: DISCONTINUED | OUTPATIENT
Start: 2024-02-01 | End: 2024-02-01 | Stop reason: HOSPADM

## 2024-02-01 NOTE — INTERVAL H&P NOTE
The above referenced H&P was reviewed by Lisa Barrera MD on 2/1/2024, the patient was examined and no significant changes have occurred in the patient's condition since the H&P was performed.  Risks, benefits, alternative treatments and consequences of no treatment were discussed.  We will proceed with procedure as planned.      Lisa Barrera MD  2/1/2024  10:22 AM

## 2024-02-01 NOTE — PROGRESS NOTES
From nursing home, here for kyphoplasty, pain is 8/10 sitting up, but lying in bed its about a 2/10. Took dilaudid at nursing home before coming here.  VSS, Aflutter on monitor, will do EKG.  Pt is not on any blood thinners. No family, was dropped off by .

## 2024-02-01 NOTE — PROGRESS NOTES
S/p kyphoplasty, t8 dressing soft C/D/I. Pt arrived via bed awake without complaints, denies pain 0/10 on pain scale.  Bedrest completed, tolerating PO intake. Once sitting up pain started increasing to 6/10, pt with coughing spell, had to be suctioned, pt feeling better, spo2 > 92%.  Voiding without complaints.  MD speaking with pt.  Discharge instructions given/explained to pt, spoke with ECTOR rubio, all questions answered, verbalized understanding.  Tele dc'd, IV dc'd catheter intact, patient discharged via w/c with [employer] transportation

## 2024-02-01 NOTE — PROCEDURES
[facility]  Procedure Note    Emma Trevizo Patient Status:  Outpatient    1940 MRN [id number]   Location [facility] Attending Mike Cheek, DO   Hosp Day # 0 PCP Cathy Martinez MD     Procedure: T8 kyphoplasty    Pre-Procedure Diagnosis:  Acute T8 compression fracture    Post-Procedure Diagnosis: Same    Anesthesia:  Local and Sedation    Findings:  T8 compression fracture. Successful T8 kyphoplasty.    Specimens: None    Blood Loss:  <5ml    Tourniquet Time: 0  Complications:  None  Drains:  None    Secondary Diagnosis:  None    Lisa Barrera MD  2024

## 2024-02-01 NOTE — DISCHARGE INSTRUCTIONS
Continue taking all home medications as directed.  Remove back dressing in 48 hours.  No heavy lifting for 1 week nothing greater than 10 pounds.  Okay to shower in 48 hours.  Follow up with physician as scheduled.

## 2024-02-06 ENCOUNTER — SNF VISIT (OUTPATIENT)
Dept: INTERNAL MEDICINE CLINIC | Age: 84
End: 2024-02-06

## 2024-02-06 DIAGNOSIS — S22.060D CLOSED WEDGE COMPRESSION FRACTURE OF T8 VERTEBRA WITH ROUTINE HEALING, SUBSEQUENT ENCOUNTER: ICD-10-CM

## 2024-02-06 DIAGNOSIS — Z98.890 S/P KYPHOPLASTY: ICD-10-CM

## 2024-02-06 DIAGNOSIS — R29.898 SEVERE MUSCLE DECONDITIONING: ICD-10-CM

## 2024-02-06 DIAGNOSIS — R06.02 SOB (SHORTNESS OF BREATH) ON EXERTION: ICD-10-CM

## 2024-02-06 DIAGNOSIS — R05.9 COUGH IN ADULT: Primary | ICD-10-CM

## 2024-02-06 DIAGNOSIS — I10 ESSENTIAL HYPERTENSION: ICD-10-CM

## 2024-02-06 DIAGNOSIS — M45.0 ANKYLOSING SPONDYLITIS OF MULTIPLE SITES IN SPINE (HCC): ICD-10-CM

## 2024-02-06 PROCEDURE — 99309 SBSQ NF CARE MODERATE MDM 30: CPT | Performed by: NURSE PRACTITIONER

## 2024-02-08 ENCOUNTER — SNF VISIT (OUTPATIENT)
Dept: INTERNAL MEDICINE CLINIC | Age: 84
End: 2024-02-08

## 2024-02-08 DIAGNOSIS — R06.02 SOB (SHORTNESS OF BREATH) ON EXERTION: ICD-10-CM

## 2024-02-08 DIAGNOSIS — M45.0 ANKYLOSING SPONDYLITIS OF MULTIPLE SITES IN SPINE (HCC): ICD-10-CM

## 2024-02-08 DIAGNOSIS — S22.060D CLOSED WEDGE COMPRESSION FRACTURE OF T8 VERTEBRA WITH ROUTINE HEALING, SUBSEQUENT ENCOUNTER: ICD-10-CM

## 2024-02-08 DIAGNOSIS — R05.9 COUGH IN ADULT: ICD-10-CM

## 2024-02-08 DIAGNOSIS — R29.898 SEVERE MUSCLE DECONDITIONING: ICD-10-CM

## 2024-02-08 DIAGNOSIS — Z98.890 S/P KYPHOPLASTY: Primary | ICD-10-CM

## 2024-02-08 PROCEDURE — 99309 SBSQ NF CARE MODERATE MDM 30: CPT | Performed by: NURSE PRACTITIONER

## 2024-02-09 ENCOUNTER — SNF DISCHARGE (OUTPATIENT)
Dept: INTERNAL MEDICINE CLINIC | Age: 84
End: 2024-02-09

## 2024-02-09 VITALS
DIASTOLIC BLOOD PRESSURE: 88 MMHG | RESPIRATION RATE: 18 BRPM | SYSTOLIC BLOOD PRESSURE: 165 MMHG | HEART RATE: 90 BPM | TEMPERATURE: 98 F | BODY MASS INDEX: 25 KG/M2 | WEIGHT: 153.38 LBS

## 2024-02-09 VITALS — SYSTOLIC BLOOD PRESSURE: 136 MMHG | DIASTOLIC BLOOD PRESSURE: 79 MMHG | WEIGHT: 154 LBS | BODY MASS INDEX: 25 KG/M2

## 2024-02-09 VITALS — DIASTOLIC BLOOD PRESSURE: 79 MMHG | SYSTOLIC BLOOD PRESSURE: 152 MMHG | WEIGHT: 153.38 LBS | BODY MASS INDEX: 25 KG/M2

## 2024-02-09 DIAGNOSIS — S22.060D CLOSED WEDGE COMPRESSION FRACTURE OF T8 VERTEBRA WITH ROUTINE HEALING, SUBSEQUENT ENCOUNTER: Primary | ICD-10-CM

## 2024-02-09 DIAGNOSIS — R05.9 COUGH IN ADULT: ICD-10-CM

## 2024-02-09 DIAGNOSIS — M45.0 ANKYLOSING SPONDYLITIS OF MULTIPLE SITES IN SPINE (HCC): ICD-10-CM

## 2024-02-09 DIAGNOSIS — R29.898 SEVERE MUSCLE DECONDITIONING: ICD-10-CM

## 2024-02-09 DIAGNOSIS — G89.21 CHRONIC PAIN DUE TO TRAUMA: ICD-10-CM

## 2024-02-09 DIAGNOSIS — I10 ESSENTIAL HYPERTENSION: ICD-10-CM

## 2024-02-09 DIAGNOSIS — Z98.890 S/P KYPHOPLASTY: ICD-10-CM

## 2024-02-09 PROCEDURE — 99316 NF DSCHRG MGMT 30 MIN+: CPT | Performed by: NURSE PRACTITIONER

## 2024-02-09 NOTE — PROGRESS NOTES
Emma Trevizo  36.  APN Encounter 24. 1:15 pm    Met with patient at bedside.  Patient is s/p kyphoplasty T-8 on 24 at [facility].  Pt is complaining of low back pain tolerating dilaudid 2 mg.   Patient c/o cough and occasional SOB.    STAT chest xray ordered; negative results.  Duonebs ordered 3 x day.  Albuterol 2 puffs every 4 hours prn.    Confirmed methotrexate order with Dr. Yusuf Rosado . Take 2.5 mg tablets (20mg) 8 tablets every Monday.    Emma POSEY Santhosh  : 1940.  83 year old  female is admitted to [facility] for rehabilitation and strengthening.      [facility] Admission:        24  Discharged to [facility]:    24  Kyphoplasty @ [facility]:         24    Chief complaint: chronic back pain, cough, SOB.     DISCHARGE DIAGNOSES:  Acute on chronic back pain.  Acute closed T-8 compression Fx s/p Kyphoplasty 24  Acute closed T9 compression fx, subsequent tx  COVID 19 infection  UTI proteus mirabilis  CAD  HTN   HLD    HPI  83 yr old female presented to the ED d/t back pain, abdominal pain with vomiting. Pt reported she had a recent T9 compression fx and prescribed pain medications.  Pt states she has been confused about her medications; typically has someone to help w/meds but currently not available.  Patient was last seen in the ED 4 days ago d/t exacerbation of chronic back pain.  Xray did not show any acute fx.  She was discharged on [place]. She reports increasing her Norco and had baclofen and steroids added per PCP.  Yesterday prior to admission, Norco was changed to Oxycodone. Miralax was added d/t constipation., Patient was admitted for chronic pain.    Past Medical History:   Diagnosis Date    Ankylosing spondylitis (HCC)     BACK SHOULDERS ARMS- receives an infusion  every 2 mos    Aortic atherosclerosis (HCC) 2018    Chart review.  Noted on CT 3/1/18.    Arrhythmia     PALPATATIONS    Arteritis, unspecified ([facility])      Left     Back problem     Cataract     Bilateral    Cholelithiasis     Collagenous colitis 02/2010    Diffuse cystic mastopathy     Disorder of bone and cartilage, unspecified     osteopenia    Elevated blood pressure reading without diagnosis of hypertension     Esophageal reflux     Glaucoma     left eye    High blood pressure     High cholesterol     Indeterminate PPD     Father had TB, seen by Dr. Ott, treated for 9 months    Intestinal disaccharidase deficiencies and disaccharide malabsorption     Muscle weakness     USES CANE    Nausea and vomiting, unspecified vomiting type 01/12/2024    Other and unspecified hyperlipidemia     Problems with swallowing     COFFEE WATER COUGHING SPELL AFTER TAKING    Rheumatoid arthritis and other inflammatory polyarthropathies     RA    Thoracic vertebral fracture (HCC)     Visual impairment     iritis     Past Surgical History:   Procedure Laterality Date    APPENDECTOMY      BIOPSY OF BREAST, INCISIONAL      L Breast    CATARACT      CHOLECYSTECTOMY  2005    COLONOSCOPY,BIOPSY  02/13/2006    nonspecific erythema transverse colon (bx marked acute & chronic colitis), ileum wnl    COLONOSCOPY,DIAGNOSTIC  1990    mild inflammatory change sigmoid colon    COLONOSCOPY,DIAGNOSTIC  02/03/2010    Dr. Rodarte, mild erythema of the rectosigmoid, possibly a variant of normal, bx show collagenous colitis, ileum normal    EGD N/A 08/09/2021    Procedure: ESOPHAGOGASTRODUODENOSCOPY WITH DILATION with bxs, COLONOSCOPY with bxs;  Surgeon: Yaya Cook MD;  Location: [facility], [facility]    HEMORRHOIDECTOMY,INT/EXT,SIMPLE      NEEDLE BIOPSY LEFT      pt cannot remember    NEEDLE BIOPSY RIGHT      pt cannot remember    OTHER AMBL SURG  2005    R leg tendon surgery with post-op wound infection    OTHER SURGICAL HISTORY      right ankle surgery    OTHER SURGICAL HISTORY  05/07/2013     left 1st metatarsophalangeal fusion. Weil osteotomy 2nd metatarsal joint and  metatarsophalgeal capsulotomy and extensor digitorum longus lengthening ans 2nd hammer toe. 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    SKIN SURGERY Left 11/19/2015    ESC of SCC to left lateral malleolus    SKIN SURGERY  03/29/2022    BCC- left posterior postauricular neck, MMS     TOTAL ABDOM HYSTERECTOMY      w/BSO    UP GI ENDOSCOPY,BALL DIL,30MM  05/04/2009    UP GI ENDOSCOPY,BALL DIL,30MM  06/12/2014    distal esophageal stricture, bx and dilated 18-20 mm    UPPER GI ENDOSCOPY,BIOPSY  05/04/2009    antral gastric erosions, distal esophageal stricture, gastric bx, esophageal dilation 18-20 mm, Performed by RACHEL GLEASON at [facility], [facility], Performed by RACHEL GLEASON at [facility]    UPPER GI ENDOSCOPY,BIOPSY  02/2010    3 gastric ulcers from diclofenac, bx for H. pylori was negative    UPPER GI ENDOSCOPY,DIAGNOSIS  1990    wnl     Family History   Problem Relation Age of Onset    Other (Other) Father         AAA, TB    Cancer Mother         uterus    Musculo-skelatal Disorder Brother         ankolosing spondylitis    Heart Disorder Brother         valve disorder/pacemaker    Musculo-skelatal Disorder Brother         ankylosing spondylitis    Other (viral headache) Brother      Social History     Socioeconomic History    Marital status:    Tobacco Use    Smoking status: Never    Smokeless tobacco: Never    Tobacco comments:     non-smoker   Vaping Use    Vaping Use: Never used   Substance and Sexual Activity    Alcohol use: Not Currently     Alcohol/week: 0.0 standard drinks of alcohol     Comment: \"rare\"    Drug use: No   Social History Narrative    : yes    Children: no    Exercise: walks     Employment: retired  @     Caffeine intake: moderate                 Social Determinants of Health     Food Insecurity: No Food Insecurity (1/13/2024)    Food Insecurity     Food Insecurity: Never true   Transportation Needs: Unmet Transportation Needs (1/13/2024)    Transportation Needs     Lack of Transportation: Yes   Physical Activity: Sufficiently Active (3/7/2022)    Exercise Vital Sign     Days of Exercise per Week: 6 days     Minutes of Exercise per Session: 30 min   Housing Stability: Low Risk  (1/13/2024)    Housing Stability     Housing Instability: No     IMMUNIZATIONS  Immunization History   Administered Date(s) Administered    >= 3 Yrs, Influenza Vaccine,(94755),Flu Clinic 10/13/2008, 10/07/2009    Covid-19 Vaccine Pfizer 30 mcg/0.3 ml 01/19/2021, 02/09/2021, 10/19/2021    FLU VAC High Dose 65 YRS & Older PRSV Free (19132) 09/02/2019    FLUAD High Dose 65 yr and older (05960) 10/12/2017    Fluzone Vaccine Medicare () 09/28/2015, 09/10/2016, 09/19/2018, 09/02/2019    HEP A 06/06/2008    HIGH DOSE FLU 65 YRS AND OLDER PRSV FREE SINGLE D (76713) FLU CLINIC 09/29/2015, 09/11/2016    Influenza 10/09/2007, 10/06/2011, 10/18/2013, 11/10/2014, 09/01/2016, 09/15/2019    Influenza Virus Vaccine, Split 09/28/2012    Pneumococcal (Prevnar 13) 12/03/2014    Pneumovax 11/17/2016    Pneumovax 23 11/16/2016    TDAP 06/06/2008   Deferred Date(s) Deferred    Influenza Vaccine Refused 03/07/2022      ALLERGIES:  Allergies   Allergen Reactions    Adhesive Tape HIVES and OTHER (SEE COMMENTS)     ZIOpatch adhesive rash    Latex UNKNOWN     Added based on information entered during case entry, please review and add reactions, type, and severity as needed     CODE STATUS:  Full Code    ADVANCED CARE PLANNING TEAM: Will need family care plan mtgs to arrange a safe discharge.    CURRENT MEDICATIONS - reviewed and updated on [facility]  Duonebs 2-3 times day  Robitussin DM prn  Albuterol 108/90 q 4  hrs  Benzoatate 100 mg prn  Dulcolax supp prn  Colace 100  Dilaudid 2 mg q 4 hrs prn  Miralax daily  Sennokot 8.6 daily  Tylenol 500 mg  Amlodipine 10 mg   Baclofen 10 mg   Ca+D 500/400 daily  Duricef  thru 1/30/24  Combigan 0.2-0.5% 1 gtt L eye twice daily  Folic acid 1 mg daily  Losartan 50 mg daily  Methotrexate 2.5mg - 8 tabs = 20 mg every Monday (per Dr. Rosado)  Metoprolol Succ 25 mg twice daily  Netarsudil Dimesylate 0.02% - 1 gtt both eyes q hs  Omeprazole 20 mg daily  Simvastatin 20mg q hs  Sulfasalazine 500 mg (1000) twice daily  Vitamin D daily  Zofran 4 mg q 6 hrs prn     SUBJECTIVE: denies chest pain. C/O mild congestion, cough. C/O back pain s/p kyphoplasty. Denies nausea.      PHYSICAL EXAM: BP 1136/79. Wgt 153.4#.  GENERAL HEALTH: well developed, well nourished female with chronic back pain.  LINES, TUBES, DRAINS:  none  SKIN: warm, dry  WOUND: see wound assessment per staff   EYES: pupils round and equal; no jaundice;  no drainage from eyes  HENT: mucous membranes pink, moist,    NECK: supple  BREAST: deferred exam   RESPIRATORY: lungs clear, + cough, duonebs, robitussin DM  CARDIOVASCULAR RRR  ABDOMEN:  normal active BS+, soft, nondistended; nontender.  LYMPHATIC:no lymphedema  MUSCULOSKELETAL: tenderness of the thoracic T8 Fx , previous T9 fx     EXTREMITIES/VASCULAR: no edema  NEUROLOGIC:follows commands  PSYCHIATRIC: alert and oriented x 3; affect appropriate      MEDICAL DECISION MAKING: patient makes her own decisions.  W  Lab Results: 1/25/24. WBC 11.6. HGB 12. Plts 269.  Na 136. K 5.1 Cl 102. CO2 30. BUN 19. Creat 0.4. GFR >60  Repeat BMP 1/25/24: Na 137. K 4.2. Cl 105. CO2 26. BUN 17. Creat 0.3.    Assessment / Plan:    Cough, SOB  Chest xray 2/5/24 negative  Duonebs 3 x daily  Robitussin DM q 4 hrs  Albuterol 108/90 - 2 puffs q 4 hrs prn    S/P kyphoplasty T-8 compression FX.   Previous T9 compression Fx  Kyphoplasty 2/1/24 9:30 am - [facility]  Dilaudid 2 mg q 4 hrs prn  Gabapentin 100  tid  Baclofen 10mg tid  Tylenol prn  PT/OT    COVID 19 infection resolved    Supportive care prn    UTI proteus mirabilis - resolved    HTN  - Metoprolol 25 mg twice daily    HLD - Simvastatin 20 q hs    Ankylosing spondylitis  Methotrexate 2.5 mg tabs = (20mg) q Monday-confirmed w/Dr.Michael Rosado  F/U with Rheumatologist - Dr. Rosado  - [phone]  PT/OT    Constipation  Colace  Sennokot   Miralax    Weakness/deconditioning  -  PT/OT    Dispo:  Plan is to return home with home health    FOLLOW UP APPOINTMENTS   *Follow-Up with PCP within 1-2 weeks following [facility] discharge.   *Follow-Up with specialists as recommended.]  *Kyphoplasty  2/1/24 9:30 am [facility]    *Greater than 35 minutes spent w/ patient and staff, including but not limited to/ reviewing present status, needs, abilities with disciplines, reviewing medical records, vital signs, labs, completing med rec and entering orders to establish plan of care in [facility].  Note to patient: The 21st Century Cures Act makes medical notes like these available to patients in the interest of transparency. However, this is a medical document intended as peer to peer communication. It is written in medical language and may contain abbreviations or verbiage that are unfamiliar. It may appear blunt or direct. Medical documents are intended to carry relevant information, facts as evident, and the clinical opinion of the practitioner who signs the document.    Naomi Thapa (Daya), CLOVIS  2/6/24 1:15 pm  [facility] Post Acute Care Team

## 2024-02-09 NOTE — PROGRESS NOTES
Santhosh Emma POSEY.   36. APN Encounter 24. Discharge Summary    Met with patient and her  at bedside.  Patient will discharge next door to their [place] apartment.  [facility]idential Home [facility] has been ordered.  Patient  is requesting order for home Duonebs.  Patient has a nebulizer machine at home.  She will be sent home with 10 vials of duonebs.  Patient will f/u with her PCP if she needs to continue treatments.      Emma Trevizo, 1940, 83 year old, female is being discharged from [facility] today 24.      DISCHARGE SUMMARY    Date of Admission to [facility]:  24    Date of Discharge from [facility]: 24                                 Admitting Diagnoses:  T-8 FX post kyphoplasty 24,  Ankylosing spondylitis.  Previous FX T9. COVID, cough with congestion, chronic pain.     Reason for Admission to [facility]: Rehabilitation and strengthening  with PT/OT.     PHYSICAL EXAM: 165/88. P 90. RR 18.  T 97.6. wgt 153.4#  GENERAL HEALTH:well developed, well nourished, in no apparent distress   LINES, TUBES, DRAINS:  none  SKIN: warm, dry  WOUND: see wound assessment,   EYES: Pupils round and equal; sclera anicteric, conjunctiva normal;  no drainage from eyes  HENT: no nasal drainage, mucous membranes pink, moist.   NECK: supple; FROM  BREAST: deferred exam   RESPIRATORY: Lungs clear; occasion cough with congestion  CARDIOVASCULAR: RRR, rate 90  ABDOMEN:  BS+, soft, no guarding. Appetitue good.  :Deferred  LYMPHATIC:no lymphedema  MUSCULOSKELETAL: compression fractures T8, T9, s/p kyphoplasty.  Weakness upper and lower extremities.  EXTREMITIES/VASCULAR: no edema  NEUROLOGIC:follows commands  PSYCHIATRIC: alert and oriented x 3; affect appropriate     CURRENT MANAGEMENT AT [facility]  Duonebs 2-3 times day  Robitussin DM prn  Albuterol 108/90 q 4 hrs  Benzoatate 100 mg prn  Dulcolax supp prn  Colace 100  Dilaudid 2 mg q 4 hrs prn (not taking)  Miralax daily  Sennokot 8.6  daily  Tylenol 500 mg  Amlodipine 10 mg   Baclofen 10 mg   Ca+D 500/400 daily  Duricef  completed 1/30/24  Combigan 0.2-0.5% 1 gtt L eye twice daily  Folic acid 1 mg daily  Losartan 50 mg daily  Methotrexate 2.5mg - 8 tabs = 20 mg every Monday (per Dr. Rosado)  Metoprolol Succ 25 mg twice daily  Netarsudil Dimesylate 0.02% - 1 gtt both eyes q hs  Omeprazole 20 mg daily  Simvastatin 20mg q hs  Sulfasalazine 500 mg (1000) twice daily  Vitamin D daily  Zofran 4 mg q 6 hrs prn    Lab Results: 1/25/24. WBC 11.6. HGB 12. Plts 269.  Na 136. K 5.1 Cl 102. CO2 30. BUN 19. Creat 0.4. GFR >60  Repeat BMP 1/25/24: Na 137. K 4.2. Cl 105. CO2 26. BUN 17. Creat 0.3.     FOLLOW UP APPOINTMENTS   F/U with PCP within 1-2 weeks.    Medications sent with patient to her home.   Home Health RN, PT, OT ordered.     Greater than 30 minutes spent today with patient and staff, including but not limited to/ coordination of care in preparation for discharge; reviewing present status, progress with therapy, discharge needs--DME and home health, reviewing medical records, labs, completing medication reconciliation, providing prescriptions and guiding referrals to PCP/specialists for continued medical care and oversight of ongoing needs.  Note to patient: The 21st Century Cures Act makes medical notes like these available to patients in the interest of transparency. However, this is a medical document intended as peer to peer communication. It is written in medical language and may contain abbreviations or verbiage that are unfamiliar. It may appear blunt or direct. Medical documents are intended to carry relevant information, facts as evident and the clinical opinion of the practitioner who signs the document.    Wilda Thpaa (Daya), APRN  2/9/2024 1130am  [facility] Post Acute Care Team

## 2024-02-09 NOTE — PROGRESS NOTES
Emma Trevizo.  36. APN Encounter 24. 2:15 pm    Met with Mrs. Trevizo at bedside sitting up receiving a duoneb treatment.  C/O intermittent cough and congestion.  Patient reports she would like an order to continue nebulizer treatments at home after discharge.   Patient is s/p kyphoplasty T-8 on 24 at [facility].  Pt reports is complaining of low back pain much improved.     Emma Trevizo  : 1940.  83 year old  female is admitted to [facility] for rehabilitation and strengthening.      [facility] Admission:        24  Discharged to [place] VERÓNICA:    24  Kyphoplasty @ [place] IR:         24    Chief complaint: chronic back pain, cough    DISCHARGE DIAGNOSES:  Acute on chronic back pain.  Acute closed T-8 compression Fx s/p Kyphoplasty 24  Acute closed T9 compression fx, subsequent tx  COVID 19 infection  UTI proteus mirabilis  CAD  HTN   HLD    HPI  83 yr old female presented to the ED d/t back pain, abdominal pain with vomiting. Pt reported she had a recent T9 compression fx and prescribed pain medications.  Pt states she has been confused about her medications; typically has someone to help w/meds but currently not available.  Patient was last seen in the ED 4 days ago d/t exacerbation of chronic back pain.  Xray did not show any acute fx.  She was discharged on [place]. She reports increasing her Norco and had baclofen and steroids added per PCP.  Yesterday prior to admission, Norco was changed to Oxycodone. Miralax was added d/t constipation., Patient was admitted for chronic pain.    Past Medical History:   Diagnosis Date    Ankylosing spondylitis (HCC)     BACK SHOULDERS ARMS- receives an infusion  every 2 mos    Aortic atherosclerosis (HCC) 2018    Chart review.  Noted on CT 3/1/18.    Arrhythmia     PALPATATIONS    Arteritis, unspecified (HCC)     Left     Back problem     Cataract     Bilateral    Cholelithiasis     Collagenous colitis  02/2010    Diffuse cystic mastopathy     Disorder of bone and cartilage, unspecified     osteopenia    Elevated blood pressure reading without diagnosis of hypertension     Esophageal reflux     Glaucoma     left eye    High blood pressure     High cholesterol     Indeterminate PPD     Father had TB, seen by Dr. Ott, treated for 9 months    Intestinal disaccharidase deficiencies and disaccharide malabsorption     Muscle weakness     USES CANE    Nausea and vomiting, unspecified vomiting type 01/12/2024    Other and unspecified hyperlipidemia     Problems with swallowing     COFFEE WATER COUGHING SPELL AFTER TAKING    Rheumatoid arthritis and other inflammatory polyarthropathies     RA    Thoracic vertebral fracture (HCC)     Visual impairment     iritis     Past Surgical History:   Procedure Laterality Date    APPENDECTOMY      BIOPSY OF BREAST, INCISIONAL      L Breast    CATARACT      CHOLECYSTECTOMY  2005    COLONOSCOPY,BIOPSY  02/13/2006    nonspecific erythema transverse colon (bx marked acute & chronic colitis), ileum wnl    COLONOSCOPY,DIAGNOSTIC  1990    mild inflammatory change sigmoid colon    COLONOSCOPY,DIAGNOSTIC  02/03/2010    Dr. Rodarte, mild erythema of the rectosigmoid, possibly a variant of normal, bx show collagenous colitis, ileum normal    EGD N/A 08/09/2021    Procedure: ESOPHAGOGASTRODUODENOSCOPY WITH DILATION with bxs, COLONOSCOPY with bxs;  Surgeon: Yaya Cook MD;  Location: [facility], [facility]    HEMORRHOIDECTOMY,INT/EXT,SIMPLE      NEEDLE BIOPSY LEFT      pt cannot remember    NEEDLE BIOPSY RIGHT      pt cannot remember    OTHER AMBL SURG  2005    R leg tendon surgery with post-op wound infection    OTHER SURGICAL HISTORY      right ankle surgery    OTHER SURGICAL HISTORY  05/07/2013     left 1st metatarsophalangeal fusion. Weil osteotomy 2nd metatarsal joint and metatarsophalgeal capsulotomy and extensor digitorum longus lengthening ans 2nd hammer toe. Implant and  infusion with proximal interphalageal resection arthroplasty    REMOVAL OF TONSILS,10+ Y/O      SKIN SURGERY  02/27/2012    EXC of an aytipcal nevus to the right nasal ala    SKIN SURGERY  02/26/2014    BCC nod to right superior helix / MMS done    SKIN SURGERY  10/07/2014    MMS for SCCIS to L cheek    SKIN SURGERY Left 11/19/2015    ESC of SCC to left lateral malleolus    SKIN SURGERY  03/29/2022    BCC- left posterior postauricular neck, MMS     TOTAL ABDOM HYSTERECTOMY      w/BSO    UP GI ENDOSCOPY,BALL DIL,30MM  05/04/2009    UP GI ENDOSCOPY,BALL DIL,30MM  06/12/2014    distal esophageal stricture, bx and dilated 18-20 mm    UPPER GI ENDOSCOPY,BIOPSY  05/04/2009    antral gastric erosions, distal esophageal stricture, gastric bx, esophageal dilation 18-20 mm, Performed by RACHEL GLEASON at [facility], [facility], Performed by RACHEL GLEASON at [facility]    UPPER GI ENDOSCOPY,BIOPSY  02/2010    3 gastric ulcers from diclofenac, bx for H. pylori was negative    UPPER GI ENDOSCOPY,DIAGNOSIS  1990    wnl     Family History   Problem Relation Age of Onset    Other (Other) Father         AAA, TB    Cancer Mother         uterus    Musculo-skelatal Disorder Brother         ankolosing spondylitis    Heart Disorder Brother         valve disorder/pacemaker    Musculo-skelatal Disorder Brother         ankylosing spondylitis    Other (viral headache) Brother      Social History     Socioeconomic History    Marital status:    Tobacco Use    Smoking status: Never    Smokeless tobacco: Never    Tobacco comments:     non-smoker   Vaping Use    Vaping Use: Never used   Substance and Sexual Activity    Alcohol use: Not Currently     Alcohol/week: 0.0 standard drinks of alcohol     Comment: \"rare\"    Drug use: No   Social History Narrative    : yes    Children: no    Exercise: walks    Employment: retired  @     Caffeine intake: moderate                 Social  Determinants of Health     Food Insecurity: No Food Insecurity (1/13/2024)    Food Insecurity     Food Insecurity: Never true   Transportation Needs: Unmet Transportation Needs (1/13/2024)    Transportation Needs     Lack of Transportation: Yes   Physical Activity: Sufficiently Active (3/7/2022)    Exercise Vital Sign     Days of Exercise per Week: 6 days     Minutes of Exercise per Session: 30 min   Housing Stability: Low Risk  (1/13/2024)    Housing Stability     Housing Instability: No     IMMUNIZATIONS  Immunization History   Administered Date(s) Administered    >= 3 Yrs, Influenza Vaccine,(71060),Flu Clinic 10/13/2008, 10/07/2009    Covid-19 Vaccine Pfizer 30 mcg/0.3 ml 01/19/2021, 02/09/2021, 10/19/2021    FLU VAC High Dose 65 YRS & Older PRSV Free (75136) 09/02/2019    FLUAD High Dose 65 yr and older (33831) 10/12/2017    Fluzone Vaccine Medicare () 09/28/2015, 09/10/2016, 09/19/2018, 09/02/2019    HEP A 06/06/2008    HIGH DOSE FLU 65 YRS AND OLDER PRSV FREE SINGLE D (25201) FLU CLINIC 09/29/2015, 09/11/2016    Influenza 10/09/2007, 10/06/2011, 10/18/2013, 11/10/2014, 09/01/2016, 09/15/2019    Influenza Virus Vaccine, Split 09/28/2012    Pneumococcal (Prevnar 13) 12/03/2014    Pneumovax 11/17/2016    Pneumovax 23 11/16/2016    TDAP 06/06/2008   Deferred Date(s) Deferred    Influenza Vaccine Refused 03/07/2022      ALLERGIES:  Allergies   Allergen Reactions    Adhesive Tape HIVES and OTHER (SEE COMMENTS)     ZIOpatch adhesive rash    Latex UNKNOWN     Added based on information entered during case entry, please review and add reactions, type, and severity as needed     CODE STATUS:  Full Code    ADVANCED CARE PLANNING TEAM: Will need family care plan mtgs to arrange a safe discharge.    CURRENT MEDICATIONS - reviewed and updated on [facility]  Duonebs 2-3 times day  Robitussin DM prn  Albuterol 108/90 q 4 hrs  Benzoatate 100 mg prn  Dulcolax supp prn  Colace 100  Dilaudid 2 mg q 4 hrs prn (not  taking)  Miralax daily  Sennokot 8.6 daily  Tylenol 500 mg  Amlodipine 10 mg   Baclofen 10 mg   Ca+D 500/400 daily  Duricef  completed 1/30/24  Combigan 0.2-0.5% 1 gtt L eye twice daily  Folic acid 1 mg daily  Losartan 50 mg daily  Methotrexate 2.5mg - 8 tabs = 20 mg every Monday (per Dr. Rosado)  Metoprolol Succ 25 mg twice daily  Netarsudil Dimesylate 0.02% - 1 gtt both eyes q hs  Omeprazole 20 mg daily  Simvastatin 20mg q hs  Sulfasalazine 500 mg (1000) twice daily  Vitamin D daily  Zofran 4 mg q 6 hrs prn     SUBJECTIVE: denies chest pain. C/O mild congestion, cough. C/O back pain s/p kyphoplasty.      PHYSICAL EXAM: /79.  P 84.   Wgt 153.4#.  GENERAL HEALTH: well developed, well nourished female with chronic back pain and congestion..  LINES, TUBES, DRAINS:  none  SKIN: warm, dry  WOUND: see wound assessment per staff   EYES: pupils round and equal; no jaundice;  no drainage from eyes  HENT: mucous membranes pink, moist,    NECK: supple  BREAST: deferred exam   RESPIRATORY: lungs clear, + cough, receiving duonebs, robitussin DM  CARDIOVASCULAR RRR rate 84.  ABDOMEN:  normal active BS+, soft, nondistended; nontender.  LYMPHATIC:no lymphedema  MUSCULOSKELETAL: tenderness of the thoracic T8 Fx s/p kyphoplasty.  previous T9 fx     EXTREMITIES/VASCULAR: no edema  NEUROLOGIC:follows commands  PSYCHIATRIC: alert and oriented x 3; affect appropriate      MEDICAL DECISION MAKING: patient makes her own decisions.  W  Lab Results: 1/25/24. WBC 11.6. HGB 12. Plts 269.  Na 136. K 5.1 Cl 102. CO2 30. BUN 19. Creat 0.4. GFR >60  Repeat BMP 1/25/24: Na 137. K 4.2. Cl 105. CO2 26. BUN 17. Creat 0.3.    Assessment / Plan:    Cough, SOB  Chest xray 2/5/24 negative  Duonebs 3 x daily  Robitussin DM q 4 hrs  Albuterol 108/90 - 2 puffs q 4 hrs prn  Arrange for home duonebs    S/P kyphoplasty T-8 compression FX.   Previous T9 compression Fx  [facility]1/[facility] 9[facility]  Dilaudid 2 mg q 4 hrs prn - not  taking  Baclofen 10mg tid  Tylenol prn  Arrange for home PT/OT    COVID 19 infection resolved    Supportive care prn    UTI proteus mirabilis - resolved    HTN  - Metoprolol 25 mg twice daily    HLD - Simvastatin 20 q hs    Ankylosing spondylitis  Methotrexate 2.5 mg tabs = (20mg) q Monday-confirmed w/Dr.Michael Rosado  F/U with Rheumatologist - Dr. Rosado  - [phone]  PT/OT    Constipation  Colace  Sennokot   Miralax    Weakness/deconditioning  -  PT/OT    Dispo:  discharge 2/9/24 home with  and home health    FOLLOW UP APPOINTMENTS   *Follow-Up with PCP within 1-2 weeks following VER[facility]NICA discharge.   *Follow-Up with specialists as recommended.]  *Kyphoplasty  2/1/24 9:30 am [facility]    *Greater than 35 minutes spent w/ patient and staff, including but not limited to/ reviewing present status, needs, abilities with disciplines, reviewing medical records, vital signs, labs, completing med rec and entering orders to establish plan of care in [facility]  Note to patient: The 21st Century Cures Act makes medical notes like these available to patients in the interest of transparency. However, this is a medical document intended as peer to peer communication. It is written in medical language and may contain abbreviations or verbiage that are unfamiliar. It may appear blunt or direct. Medical documents are intended to carry relevant information, facts as evident, and the clinical opinion of the practitioner who signs the document.    CLOVIS Morris (Daya)  2/8/24 2:15 pm  [facility] Acute Care Team

## 2024-03-12 ENCOUNTER — LAB REQUISITION (OUTPATIENT)
Dept: LAB | Facility: HOSPITAL | Age: 84
End: 2024-03-12
Payer: MEDICARE

## 2024-03-12 DIAGNOSIS — I25.10 ATHEROSCLEROTIC HEART DISEASE OF NATIVE CORONARY ARTERY WITHOUT ANGINA PECTORIS: ICD-10-CM

## 2024-03-12 DIAGNOSIS — I10 ESSENTIAL (PRIMARY) HYPERTENSION: ICD-10-CM

## 2024-03-12 LAB
ANION GAP SERPL CALC-SCNC: <0 MMOL/L (ref 0–18)
BNP SERPL-MCNC: 294 PG/ML
BUN BLD-MCNC: 14 MG/DL (ref 9–23)
BUN/CREAT SERPL: 33.3 (ref 10–20)
CALCIUM BLD-MCNC: 9.7 MG/DL (ref 8.7–10.4)
CHLORIDE SERPL-SCNC: 113 MMOL/L (ref 98–112)
CO2 SERPL-SCNC: 25 MMOL/L (ref 21–32)
CREAT BLD-MCNC: 0.42 MG/DL
EGFRCR SERPLBLD CKD-EPI 2021: 96 ML/MIN/1.73M2 (ref 60–?)
GLUCOSE BLD-MCNC: 106 MG/DL (ref 70–99)
OSMOLALITY SERPL CALC.SUM OF ELEC: 281 MOSM/KG (ref 275–295)
POTASSIUM SERPL-SCNC: 4.4 MMOL/L (ref 3.5–5.1)
SODIUM SERPL-SCNC: 135 MMOL/L (ref 136–145)

## 2024-03-12 PROCEDURE — 83880 ASSAY OF NATRIURETIC PEPTIDE: CPT | Performed by: INTERNAL MEDICINE

## 2024-03-12 PROCEDURE — 80048 BASIC METABOLIC PNL TOTAL CA: CPT | Performed by: INTERNAL MEDICINE

## 2024-04-02 ENCOUNTER — HOSPITAL ENCOUNTER (OUTPATIENT)
Dept: GENERAL RADIOLOGY | Facility: HOSPITAL | Age: 84
Discharge: HOME | End: 2024-04-02
Attending: INTERNAL MEDICINE
Payer: MEDICARE

## 2024-04-02 DIAGNOSIS — R13.19 ESOPHAGEAL DYSPHAGIA: ICD-10-CM

## 2024-04-02 PROCEDURE — 74220 X-RAY XM ESOPHAGUS 1CNTRST: CPT | Performed by: INTERNAL MEDICINE

## 2024-05-02 ENCOUNTER — HOSPITAL ENCOUNTER (OUTPATIENT)
Dept: GENERAL RADIOLOGY | Facility: HOSPITAL | Age: 84
Discharge: HOME | End: 2024-05-02
Attending: INTERNAL MEDICINE
Payer: MEDICARE

## 2024-05-02 DIAGNOSIS — R13.19 ESOPHAGEAL DYSPHAGIA: ICD-10-CM

## 2024-05-02 PROCEDURE — 74230 X-RAY XM SWLNG FUNCJ C+: CPT | Performed by: INTERNAL MEDICINE

## 2024-05-02 PROCEDURE — 92611 MOTION FLUOROSCOPY/SWALLOW: CPT

## 2024-05-02 NOTE — PROGRESS NOTES
ADULT VIDEOFLUOROSCOPIC SWALLOWING STUDY    Admission Date: 5/2/2024  Evaluation Date: 05/02/24  Radiologist: Dr Cervantes    RECOMMENDATIONS   Diet Recommendations - Solids: Regular  Diet Recommendations - Liquids: Thin Liquids    Compensatory Strategies Recommended: Alternate consistencies; Upright position for all PO intake    Medication Administration Recommendations: Crushed in puree (or use liquid form)      Further Follow-up:   Treatment Plan/Recommendations:  (Trial dysphagia therapy with outpatient SLP, if patient desires. Otherwise, recommend swallow strategies as recommended above)    HISTORY     Problem List  Active Problems:    * No active hospital problems. *      Past Medical History  Past Medical History:    Ankylosing spondylitis (HCC)    BACK SHOULDERS ARMS- receives an infusion  every 2 mos    Aortic atherosclerosis (HCC)    Chart review.  Noted on CT 3/1/18.    Arrhythmia    PALPATATIONS    Arteritis, unspecified (HCC)    Left     Back problem    Cataract    Bilateral    Cholelithiasis    Collagenous colitis    Diffuse cystic mastopathy    Disorder of bone and cartilage, unspecified    osteopenia    Elevated blood pressure reading without diagnosis of hypertension    Esophageal reflux    Glaucoma    left eye    High blood pressure    High cholesterol    Indeterminate PPD    Father had TB, seen by Dr. Ott, treated for 9 months    Intestinal disaccharidase deficiencies and disaccharide malabsorption    Muscle weakness    USES CANE    Nausea and vomiting, unspecified vomiting type    Other and unspecified hyperlipidemia    Problems with swallowing    COFFEE WATER COUGHING SPELL AFTER TAKING    Rheumatoid arthritis and other inflammatory polyarthropathies    RA    Thoracic vertebral fracture (HCC)    Visual impairment    iritis             Prior Living Situation: Home with spouse  History of Recent: No recent respiratory difficulty     Imaging results: XR ESOPHAGRAM 4/2/24:  1. Moderate to severe  smoothly tapering stricture at the GE junction.  No abrupt caliber transition or shouldering.   2. Decreased motility of the upper to mid esophagus with transient tertiary contractions.     Reason for Referral: R/O aspiration; increased cough and difficulty swallowing with liquid and pills; \"swallowing twice helps\"  Patient with hx Schatzki ring with dilatation in 2021.  Medical history includes ankylosing spondylitis, GERD    Family/Patient Goals:     ASSESSMENT   DYSPHAGIA ASSESSMENT  Test completed in conjunction with Radiologist.  Patient Positioned: Upright;Midline;Standard Chair.  Patient Viewed: Lateral.   .  Consistencies Presented: Thin liquids;Puree;Hard solid (Barium Tablet) to assess oropharyngeal swallow function and assess for compensatory strategies to improve safe swallow function.    THIN LIQUIDS  Method of Presentation: Cup  Oral Phase of Swallow (VFSS - Thin Liquids): Within Functional Limits  Laryngeal Penetration: During the swallow;Transient  Tracheal Aspiration: None  Cough/Throat Clear Response: No        PUREE  Oral Phase of Swallow (VFSS - Puree): Within Functional Limits  Laryngeal Penetration: None  Tracheal Aspiration: None     HARD SOLID  Oral Phase of Swallow (VFSS - Hard Solid): Within Functional Limits  Laryngeal Penetration: None  Tracheal Aspiration: None  Penetration Aspiration Scale Score: Score 2: Material enters the airway, remains above the vocal folds, and is ejected from the airway (with thin liquid intake only)       Overall Impression:  Mild pharyngeal esophageal dysphagia 2/2 decreased epiglottic inversion and decreased UES relaxation resulting in decreased LV closure with mild laryngeal penetration during the swallow with thin liquid intake (penetrated material clears with force of swallow) and observed barium tablet \"stuck\" in valleculae and then at level of UES, due to no epiglottic inversion (epiglottic appeared slightly curled which tablet seemed to adhere to), and  reduced UES relaxation.  Upon subsequent, successive thin liquid washes, tablet eventually passed through UES and into esophagus.  Reduced UES relaxation suspected however improved with increased swallow force and/or increased weighted bolus.    Patient exhibited overall adequate swallow efficiency as there was no evidence of bolus residuals (aside from entire barium tablet) within hypopharynx.  There was no evidence of tracheal aspiration observed within scope of this study across multiple trials of various consistencies.     Recommend consideration for trial of OP dysphagia therapy with SLP for laryngeal elevation therapeutic exercises, diet texture analysis, and training in compensatory strategies, if patient desires.  Otherwise, recommend con't PO diet, and alternate between solid and liquid.  Recommend crushed pills and administer with puree form (applesauce, pudding, yogurt, etc) if not contraindicated.  Or find liquid form of pills.       INTERDISCIPLINARY COMMUNICATION  Reviewed results with Radiologist; agreement verbalized.    Patient Experiencing Pain: No        FOLLOW UP  Treatment Plan/Recommendations:  (Trial dysphagia therapy with outpatient SLP? If patient desires)       Thank you for your referral.   If you have any questions, please contact Hazel Wilson, DAVID Wilson MS CCC-SLP/L, pager [phone]  Speech-LanguagePathologist

## 2024-06-07 ENCOUNTER — HOSPITAL ENCOUNTER (EMERGENCY)
Facility: HOSPITAL | Age: 84
Discharge: HOME | End: 2024-06-07
Payer: MEDICARE

## 2024-06-07 VITALS
DIASTOLIC BLOOD PRESSURE: 85 MMHG | HEART RATE: 61 BPM | SYSTOLIC BLOOD PRESSURE: 97 MMHG | RESPIRATION RATE: 17 BRPM | TEMPERATURE: 98 F | BODY MASS INDEX: 24.33 KG/M2 | HEIGHT: 67 IN | OXYGEN SATURATION: 96 % | WEIGHT: 155 LBS

## 2024-06-07 DIAGNOSIS — S81.819A: Primary | ICD-10-CM

## 2024-06-07 LAB — GLUCOSE BLD-MCNC: 87 MG/DL (ref 70–99)

## 2024-06-07 PROCEDURE — 90471 IMMUNIZATION ADMIN: CPT

## 2024-06-07 PROCEDURE — 12005 RPR S/N/A/GEN/TRK12.6-20.0CM: CPT

## 2024-06-07 PROCEDURE — S0119 ONDANSETRON 4 MG: HCPCS | Performed by: PHYSICIAN ASSISTANT

## 2024-06-07 PROCEDURE — 82962 GLUCOSE BLOOD TEST: CPT

## 2024-06-07 PROCEDURE — 99284 EMERGENCY DEPT VISIT MOD MDM: CPT

## 2024-06-07 PROCEDURE — 99283 EMERGENCY DEPT VISIT LOW MDM: CPT

## 2024-06-07 RX ORDER — CEPHALEXIN 500 MG/1
500 CAPSULE ORAL 3 TIMES DAILY
Qty: 21 CAPSULE | Refills: 0 | Status: SHIPPED | OUTPATIENT
Start: 2024-06-07 | End: 2024-06-14

## 2024-06-07 RX ORDER — ONDANSETRON 4 MG/1
4 TABLET, ORALLY DISINTEGRATING ORAL ONCE
Status: COMPLETED | OUTPATIENT
Start: 2024-06-07 | End: 2024-06-07

## 2024-06-07 RX ORDER — ONDANSETRON 2 MG/ML
4 INJECTION INTRAMUSCULAR; INTRAVENOUS ONCE
Status: DISCONTINUED | OUTPATIENT
Start: 2024-06-07 | End: 2024-06-07

## 2024-06-07 NOTE — ED INITIAL ASSESSMENT (HPI)
Patient to ER via EMS. Patient was in restroom and walker hit her leg. Lac to left leg. No blood thinners.

## 2024-06-07 NOTE — DISCHARGE INSTRUCTIONS
Start oral antibiotic.  I recommend leaving original dressing for the initial 48 to 72 hours.  Have a wound check performed by either your visiting nurse or the immediate care.  Gelfoam should be removed after day 3.  Typically, soaking the extremity in warm water for 1 to 2 minutes will cause the Gelfoam to soften into a jellylike substance that can easily be removed.  I would continue to bandage during the day and then leave open to air at night once the Gelfoam has been removed.  For any signs of infection immediately seek reevaluation

## 2024-06-07 NOTE — ED PROVIDER NOTES
Patient Seen in: [facility] Emergency Department      History     Chief Complaint   Patient presents with    Laceration/Abrasion     Stated Complaint: lac to left leg    Subjective:   HPI    83-year-old female.  Patient arrives to the emergency department for evaluation of left lower extremity laceration.  Just prior to arrival, patient was at a bathroom.  The tile walls were in a somewhat awkward location and as she bent to move them, she lost her balance and fell forward into her walker.  She believes the hinge of the walker struck her left distal tib-fib region causing a laceration.  She fell somewhat awkwardly on top of the walker.  She did not injure her chest wall, upper extremities.  There is no trauma to the head.  She was ambulatory immediately after the incident.  No preceding lightheadedness, dizziness.  Upon arrival, she feels well.  She is unsure of her tetanus status.  Medical record reviewed.  Patient last had a tetanus 2008.  This will be updated today.    Objective:   Past Medical History:    Ankylosing spondylitis (HCC)    BACK SHOULDERS ARMS- receives an infusion  every 2 mos    Aortic atherosclerosis (HCC)    Chart review.  Noted on CT 3/1/18.    Arrhythmia    PALPATATIONS    Arteritis, unspecified (HCC)    Left     Back problem    Cataract    Bilateral    Cholelithiasis    Collagenous colitis    Diffuse cystic mastopathy    Disorder of bone and cartilage, unspecified    osteopenia    Elevated blood pressure reading without diagnosis of hypertension    Esophageal reflux    Glaucoma    left eye    High blood pressure    High cholesterol    Indeterminate PPD    Father had TB, seen by Dr. Ott, treated for 9 months    Intestinal disaccharidase deficiencies and disaccharide malabsorption    Muscle weakness    USES CANE    Nausea and vomiting, unspecified vomiting type    Other and unspecified hyperlipidemia    Problems with swallowing    COFFEE WATER COUGHING SPELL AFTER TAKING     Rheumatoid arthritis and other inflammatory polyarthropathies    RA    Thoracic vertebral fracture (HCC)    Visual impairment    iritis              Past Surgical History:   Procedure Laterality Date    Appendectomy      Biopsy of breast, incisional      L Breast    Cataract      Cholecystectomy  2005    Colonoscopy,biopsy  02/13/2006    nonspecific erythema transverse colon (bx marked acute & chronic colitis), ileum wnl    Colonoscopy,diagnostic  1990    mild inflammatory change sigmoid colon    Colonoscopy,diagnostic  02/03/2010    Dr. Rodarte, mild erythema of the rectosigmoid, possibly a variant of normal, bx show collagenous colitis, ileum normal    Egd N/A 08/09/2021    Procedure: ESOPHAGOGASTRODUODENOSCOPY WITH DILATION with bxs, COLONOSCOPY with bxs;  Surgeon: Yaya Cook MD;  Location: [facility], [facility]    Hemorrhoidectomy,int/ext,simple      Needle biopsy left      pt cannot remember    Needle biopsy right      pt cannot remember    Other ambl surg  2005    R leg tendon surgery with post-op wound infection    Other surgical history      right ankle surgery    Other surgical history  05/07/2013     left 1st metatarsophalangeal fusion. Weil osteotomy 2nd metatarsal joint and metatarsophalgeal capsulotomy and extensor digitorum longus lengthening ans 2nd hammer toe. 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    Skin surgery Left 11/19/2015    ESC of SCC to left lateral malleolus    Skin surgery  03/29/2022    BCC- left posterior postauricular neck, MMS     Total abdom hysterectomy      w/BSO    Up gi endoscopy,ball dil,30mm  05/04/2009    Up gi endoscopy,ball dil,30mm  06/12/2014    distal esophageal stricture, bx and dilated 18-20  mm    Upper gi endoscopy,biopsy  05/04/2009    antral gastric erosions, distal esophageal stricture, gastric bx, esophageal dilation 18-20 mm, Performed by RACHEL GLEASON at [facility], [facility], Performed by RACHEL GLEASON at [facility], [facility]    Upper gi endoscopy,biopsy  02/2010    3 gastric ulcers from diclofenac, bx for H. pylori was negative    Upper gi endoscopy,diagnosis  1990    wnl                Social History     Socioeconomic History    Marital status:    Tobacco Use    Smoking status: Never    Smokeless tobacco: Never    Tobacco comments:     non-smoker   Vaping Use    Vaping status: Never Used   Substance and Sexual Activity    Alcohol use: Yes     Comment: \"rare\"    Drug use: No   Social History Narrative    : yes    Children: no    Exercise: walks    Employment: retired  @     Caffeine intake: moderate                 Social Determinants of Health     Financial Resource Strain: Low Risk  (3/7/2022)    Received from [facility], [facility]    Overall Financial Resource Strain (CARDIA)     Difficulty of Paying Living Expenses: Not hard at all   Food Insecurity: No Food Insecurity (1/13/2024)    Food Insecurity     Food Insecurity: Never true   Transportation Needs: Unmet Transportation Needs (1/13/2024)    Transportation Needs     Lack of Transportation: Yes   Physical Activity: Sufficiently Active (3/7/2022)    Exercise Vital Sign     Days of Exercise per Week: 6 days     Minutes of Exercise per Session: 30 min   Stress: Stress Concern Present (3/7/2022)    Received from [facility], [facility]    [employer] [place] [employer] - Occupational Stress Questionnaire     Feeling of Stress : To some extent   Social Connections: Socially Integrated (3/7/2022)    Received from [facility]    Social Connection and Isolation Panel [NHANES]     Frequency of Communication with  Friends and Family: Three times a week     Frequency of Social Gatherings with Friends and Family: More than three times a week     Attends Protestant Services: More than 4 times per year     Active Member of Clubs or Organizations: Yes     Attends Club or Organization Meetings: More than 4 times per year     Marital Status:    Housing Stability: Low Risk  (1/13/2024)    Housing Stability     Housing Instability: No              Review of Systems    Positive for stated complaint: lac to left leg  Other systems are as noted in HPI.  Constitutional and vital signs reviewed.      All other systems reviewed and negative except as noted above.    Physical Exam     ED Triage Vitals   BP 06/07/24 1222 147/60   Pulse 06/07/24 1222 74   Resp 06/07/24 1222 12   Temp 06/07/24 1218 97.5 °F (36.4 °C)   Temp src 06/07/24 1218 Temporal   SpO2 06/07/24 1230 96 %   O2 Device 06/07/24 1218 None (Room air)       Current Vitals:   Vital Signs  BP: 147/60  Pulse: 61  Resp: 19  Temp: 97.5 °F (36.4 °C)  Temp src: Temporal  MAP (mmHg): 73    Oxygen Therapy  SpO2: 96 %  O2 Device: None (Room air)            Physical Exam      Gen: Well appearing, well groomed, alert and aware x 3  Neck: Supple, full range of motion, no thyromegaly or lymphadenopathy.  No cervical point tenderness.  Eye examination: EOMs are intact, normal conjunctival  ENT: No suarez sign, raccoon sign or hemotympanum  Chest wall: No pain to palpation.  No tent sign  Lung: No distress, RR, no retraction, breath sounds are clear bilaterally  Cardio: Regular rate and rhythm, normal S1-S2, no murmur appreciable  Abdominal: Soft exam.  No distention.  No fluid wave.    No pain to palpation all 4 quadrants  Extremities: Full ROM, no deformity, NVI  Back: Full range of motion  Skin: Left midline distal tib-fib region there is a large flap type laceration measuring roughly 13 cm. The tibial fascia is visualized and intact.  Subcutaneous fat noted.  Minimal active bleeding.   No pulsatile bleeding.  Neuro: Cranial nerves intact (taste and smell omited), Normal Gait.    ED Course     Labs Reviewed   POCT GLUCOSE - Normal                      MDM            Tetanus updated    To the mid to distal tib-fib region there is a large flap type laceration measuring roughly 13 cm.  The tibial fascia is visualized and intact.  Subcutaneous fat noted.  Minimal active bleeding.  No pulsatile bleeding.    Verbal consent for the following procedure was obtained.  We discussed the inherent risks of procedure.  We discussed benefits.  Patient agrees to proceed with the procedure and verbalizes understanding of potential complications.      Using 1% lidocaine with epinephrine, local injections were performed.  7 cc utilized    Using 2-0 Ethilon, a horizontal mattress suture was first placed to the right lateral aspect to reapproximate wound edges.  This was also performed to the medial aspect.  An additional 11 sutures, simple interrupted 2-0 Ethilon, were then placed.  The lateral horizontal mattress was removed.  There is one region, roughly 2 cm in length, that is well-approximated but has complete skin avulsion.  Gelfoam was applied to this region.    Recommend wound check in 3 days.  Gelfoam should be removed at that time    During the procedure, the patient had some mild nausea.  Accu-Chek was immediately performed.  Glucose of 87.  She received Zofran.  She quickly improved.  Ice pack was applied to her nape.  She was placed in Trendelenburg.    Site bandaged with nonadherent dressing.  Recommend recheck in 72 hours    Will initiate Keflex      Start oral antibiotic.  I recommend leaving original dressing for the initial 48 to 72 hours.  Have a wound check performed by either your visiting nurse or the immediate care.  Gelfoam should be removed after day 3.  Typically, soaking the extremity in warm water for 1 to 2 minutes will cause the Gelfoam to soften into a jellylike substance that can easily  be removed.  I would continue to bandage during the day and then leave open to air at night once the Gelfoam has been removed.  For any signs of infection immediately seek reevaluation                     Medical Decision Making      Disposition and Plan     Clinical Impression:  1. Flap laceration of lower extremity         Disposition:  There is no disposition on file for this visit.  There is no disposition time on file for this visit.    Follow-up:  Cathy Martinez MD  [address] [address]  [phone]    Follow up            Medications Prescribed:  Current Discharge Medication List        START taking these medications    Details   cephalexin 500 MG Oral Cap Take 1 capsule (500 mg total) by mouth 3 (three) times daily for 7 days.  Qty: 21 capsule, Refills: 0

## 2024-12-05 NOTE — OPERATIVE REPORT
PATIENT NAME: Emma Trevizo  MRN: [id number]  DATE OF OPERATION:  12/6/24  REFERRING PHYSICIAN: Dr. Martinez  Medications:  MAC  PREOPERATIVE DIAGNOSIS   Dysphagia   POSTOPERATIVE DIAGNOSIS:  Schatzki's ring, biopsied and dilated to 20 mm with a balloon.  2 - 3 cm hiatal hernia.  Mild gastritis.  Biopsies were done to assess for h pylori infection.   Normal duodenum.    PROCEDURE PERFORMED:  Esophagogastroduodenoscopy with biopsy and esophogeal balloon dilation  Endoscopist:  Yaya Cook MD     PROCEDURE AND FINDINGS: The patient was placed into the left lateral decubitus after informed consent was obtained.  All questions were answered.  An updated history and physical were performed and an ASA score of 3 was assigned.  Informed consent was obtained prior to the procedure.  Complications were reviewed including risks of bleeding and perforation.         The [employer] video gastroscope was introduced into the mouth and passed into the esophogus after administration of MAC sedation.  The entire examined esophagus was remarkable for a smooth tapered circumferential narrowing at the GE junction consistent with a Schatzki's ring, which was extensively biopsied.  There was also a 2 - 3 hiatal hernia.  The entire examined stomach,  including retroflexion view which was remarkable for mildly increased areas of erythema and friability consistent with gastritis.  Biopsies were done to assess for h pylori infection.  The  duodenum was examined to the third portion and was normal.   The scope was then pulled back into the esophogus.    Esophogeal balloon dilation was performed.   An 18, 19 and 20 mm balloon were each inflated across the gastroesophogeal junction for 30 seconds.  Adequate dilation was noted as several small tears were noted in the Schatzki's ring.  There was resistance to the 19 and 20 mm balloon.   The gastroscope was removed from the patient.  The procedure was completed. The patient tolerated the procedure  well.    RECOMMENDATIONS   Avoid aspirin and nsaids for 7 days.  The patient will be provided with a written summary of today's endoscopic findings.  The patient will be notified with biopsy results in 2 - 4 days.   Follow up with Dr. Mijares in 1 - 2 months.  Yaya Cook MD, Gastroenterologist  Cc: Dr. Martinez

## 2024-12-05 NOTE — H&P
REFERRING PHYSICIAN: Dr. Juan ref. provider found     HPI:          Thank you very much for requesting me to see the patient.     As you know, Emma Trevizo is a 84 year old female who presents today       for f/u dysphagia. Relates speech therapy has helped but has has progressive symptoms in last 3 months.  Last EGD 8/2021 \"Schatzki's ring, biopsied and dilated to 20 mm with a balloon. - -3 cm hiatal hernia.\"  On omeprazole 20 bid x 1.5 months. Ros neg otherwise.        PMH: ankylosing spondylitis; aortic atherosclerosis; arteriti; back problem; B cataract; cholelithiasis; collagenous colitis; diffuse cystic mastopathy; osteopenia;HTN; PSVT; gerd; glaucoma; indeterminate PPD; intestinal disaccharidase deficiencies and disaccharide malabsorption; muscle weakness; RA; monoclonal M disease; T12 burst fx;      PMH-GI:                   2010 -- colonoscopy      8/9/2021 -- EGD/colonoscopy -- Dr. Cook -- \"PREOPERATIVE DIAGNOSIS: Hx of collagenous colitis. Chronic intermittent diarrhea. High neck dysphagia. POSTOPERATIVE DIAGNOSIS: 1. Mild gastritis with scattered erosions.  Biopsies were done to assess for h pylori. 2. Schatzki's ring, biopsied and dilated to 20 mm with a balloon. 3. 3 cm hiatal hernia. 4. Diverticulosis were scattered throughout the left colon. Random biopsies done to assess for microscopic colitis.\" Duodenal bx = peptic duodenitis; gastric bx negative for H pylori; esophageal biopsies \"mild chronic esophagitis and reactive changes\"; random colon biopsies = Normal.           1/6/2022 -- video swallow study = Patient exhibited overall adequate safety and efficiency of swallow during controlled conditions of this study.                   11/10/2022-- ov -- Dr. Cook -- \"Emma Trevizo present for evaluation and management of dysphagia.  Patient currently with intermittent complaints of  dysphagia to liquids - some coughing.  Intermittent diet related diarrhea.  No episodes of vomiting and  esophogeal obstruction.  Other gastrointestinal complaints include none.   Pt last seen 11/2021 - had esophagogastroduodenoscopy with dilation of Schatzki's ring, and colonoscopy 8/2021 - hiatal hernia, Schatzki's ring and diverticulosis .  Labs, xrays or endoscopies since last visit include - negative video swallow evaluation.  New medical issues since last visit include issues around Ankylosing spondylitis.        Emma Trevizo presents for follow up evaluation and management of diarrhea, hx of collagenous colitis and dysphagia  Pt continues to have high neck dysphagia - did not comply with video swallow evaluation.  Hx of ankylosing spondylitis - no hx of c spine disease.  Continues to have intermittent choking.   Current symptoms include intermittent diarrhea and high neck dysphagia to liquids. Recent work up has included EGD and colonoscopy 8/2021.  Recent changes in medications include none.   Pt last seen for endoscopies.  New complaints or medical issues since last visit include none.    - - - -Assessment:  GERD. Dysphagia. Diarrhea-     The patient's complaint of dysphagia is to liquids only, high in neck with some intermittent choking, eliminated with double or triple swallowing.  S/p EGD with dilation last year of Schatzki's ring.  Colonoscopy last year negative.  Answered various questions.  Diarrhea controlled with diet. Plan:      1. Famotidine refilled - 40 mg per day.      2.  Follow up as needed.\"                  8/22/2023 --ct chest abdomen pelvis for R sided chest pain, abdominal pain -- 1. No acute pulmonary embolus. 2. Stable left adrenal nodule. 3. Stable left lower lobe pulmonary nodule.  Given stability, this is likely benign. 4. Colonic diverticulosis without evidence of diverticulitis.                   11/22/2023 -- ov --Abi Chapin, Nurse Practitioner -- \"Gastroesophageal reflux disease without esophagitis. Dysphagia, unspecified type. Dysphagia with pills and liquids. When drinking  liquids will start coughing. Episodes last a couple minutes and will resolve. Pills will also get stuck at times. Does note that solid foods are slower to pass through. Has not been completing meals due discomfort associated with eating. GERD with primary symptoms of heartburn and coughing. Started on Omeprazole 20 mg twice daily started 3 days ago and has noticed some improvement. Was only maintained on famotidine 20 mg twice daily previously. Feels that the symptoms have been worsening over the last few months. Completed video swallow testing 1/2022 and was unremarkable. Underwent colonoscopy and EGD with Dr. Cook 8/2021 and was noted to have a Schatzki's ring at that time which was dilated to 20 mm. Biopsies were consistent with reflux changes. Proceed with EGD to rule out esophagitis, esophageal stricture or other GI etiologies. PLAN: EGD with possible dilation and biopsies. [facility] [place] and Dr. Cook.\"                   1/12/2024 -- CT abdomen pelvis = There are some mildly prominent loops of small bowel without evidence of obstruction.  This is nonspecific and could represent enteritis.                   3/28/2024 -- ov -- In summary this is a 83 year old female with history of ankylosing spondylitis. C/o primarily liquid dysphagia and pills. Symptoms nearly daily. Has coughing immediately as starts to drink water. ? Transfer dysphagia; ? Cricopharyngeal bar; ? Esophageal dysmotility. ? Zenker's. Was scheduled for EGD with Dr. Cook in 12/2023 but had to be cancelled to thoracic vertebral fx.  PLAN: 1.) video swallow study 2.) barium esophagram   3.) may need EGD with MAC (monitored anesthesia care) pending above                   5/2/2024 -- video swallow study: Mild, transient laryngeal penetration upon administration of thin liquid barium.  No evidence of tracheal aspiration.  Patient had difficulty swallowing the barium pill, which initially lodged in the vallecula.  Otherwise, the swallow exam is within  normal limits.                  4/2/2024 --  barium esophagram: 1. Moderate to severe smoothly tapering stricture at the GE junction.  No abrupt caliber transition or shouldering. 2. Decreased motility of the upper to mid esophagus with transient tertiary contractions.                             5/14/2024 --Dr. Mijares:  \"Patient notified of results.  RECOMMEND:  Speech therapy -- order placed in Epic.  EGD with MAC (monitored anesthesia care) with dilation at [facility].  Please call patient after 2 pm tomorrow per her request. Thank you.  MD Dyllan\"        SOC: retired  [employer]; no tob; friend of Dr. Deandra Fan dr. (Dr. Velásquez, Dejan Laboy passed away from cancer; Dr. Young passed away as well)     Fhx: ankylosing spondylitis \"lots of people\";              Past Medical History:   Diagnosis Date    Ankylosing spondylitis (HCC)      Aortic atherosclerosis (HCC) 6/27/2018     Chart review.  Noted on CT 3/1/18.    Arteritis, unspecified (HCC)       Left     Back problem      Cataract       Bilateral    Cholelithiasis      Collagenous colitis 2/10    Diffuse cystic mastopathy      Disorder of bone and cartilage, unspecified       osteopenia    Elevated blood pressure reading without diagnosis of hypertension      Esophageal reflux      Glaucoma       left eye    High blood pressure      High cholesterol      Indeterminate PPD       Father had TB, seen by Dr. Ott, treated for 9 months    Intestinal disaccharidase deficiencies and disaccharide malabsorption      Muscle weakness      Other and unspecified hyperlipidemia      Rheumatoid arthritis and other inflammatory polyarthropathies       RA    Visual impairment       iritis            Past Surgical History:   Procedure Laterality Date    APPENDECTOMY        BIOPSY OF BREAST, INCISIONAL         L Breast    CHOLECYSTECTOMY   2005    COLONOSCOPY,BIOPSY   2/13/06     nonspecific erythema transverse colon (bx marked acute & chronic  colitis), ileum wnl    COLONOSCOPY,DIAGNOSTIC   1990     mild inflammatory change sigmoid colon    COLONOSCOPY,DIAGNOSTIC   2/3/10     Dr. Rodarte, mild erythema of the rectosigmoid, possibly a variant of normal, bx show collagenous colitis, ileum normal    EGD N/A 8/9/2021     Procedure: ESOPHAGOGASTRODUODENOSCOPY WITH DILATION with bxs, COLONOSCOPY with bxs;  Surgeon: Yaya Cook MD;  [facility]: [facility], [facility]    HEMORRHOIDECTOMY,INT/EXT,SIMPLE        NEEDLE BIOPSY LEFT         pt cannot remember    NEEDLE BIOPSY RIGHT         pt cannot remember    OTHER AMBL SURG   2005     R leg tendon surgery with post-op wound infection    OTHER SURGICAL HISTORY         right ankle surgery    OTHER SURGICAL HISTORY   5/7/13      left 1st metatarsophalangeal fusion. Weil osteotomy 2nd metatarsal joint and metatarsophalgeal capsulotomy and extensor digitorum longus lengthening ans 2nd hammer toe. 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 Herron    TOTAL ABDOM HYSTERECTOMY         w/BSO    UP GI ENDOSCOPY,BALL DIL,30MM   5/4/09    UP GI ENDOSCOPY,BALL DIL,30MM   6/12/14     distal esophageal stricture, bx and dilated 18-20 mm    UPPER GI ENDOSCOPY,BIOPSY   5/4/09     antral gastric erosions, distal esophageal stricture, gastric bx, esophageal dilation 18-20 mm, Performed by RACHEL GLEASON at [facility], Performed by RACHEL GLEASON at [facility]    UPPER GI ENDOSCOPY,BIOPSY   2/10     3 gastric ulcers from  diclofenac, bx for H. pylori was negative    UPPER GI ENDOSCOPY,DIAGNOSIS   1990     wnl      Social History    Tobacco Use      Smoking status: Never      Smokeless tobacco: Never      Tobacco comments: non-smoker    Vaping Use      Vaping status: Never Used    Alcohol use: Not Currently      Alcohol/week: 0.0 standard drinks of alcohol      Comment: \"rare\"    Drug use: No           No current facility-administered medications for this visit.          Allergies      Adhesive Tape           HIVES, OTHER (SEE COMMENTS)    Comment:ZIOpatch adhesive rash  Latex                   UNKNOWN    Comment:Added based on information entered during case             entry, please review and add reactions, type, and             severity as needed  Proton Pump Inhibit*    DIARRHEA    Comment:With Prilosec, Aciphex, Nexium        A comprehensive 10 point review of systems was completed.  Pertinent positives and negatives noted in the the HPI.     EXAM:  There were no vitals taken for this visit. -There is no height or weight on file to calculate BMI.  GENERAL: well developed, well nourished, in no apparent distress  SKIN: no rashes, no suspicious lesions  HEENT: anicteric; no JVD.  NECK: supple, no adenopathy, no bruits  LUNGS: clear to auscultation  CARDIO: RRR, nl s1 and s2. No murmers appreciated.  GI: Soft, NT, ND, normal BS. NO HSM, masses, hernias or bruits.  EXTREMITIES: no cyanosis, clubbing or edema     ___________________________________________________________     ASSESSMENT: In summary this is a 84 year old female history of ankylosing spondylitis (Remicade), who presents today for f/u dysphagia. Relates speech therapy has helped but has has progressive symptoms in last 3 months.  Last EGD by Dr. Cook 8/2021 \"Schatzki's ring, biopsied and dilated to 20 mm with a balloon. - -3 cm hiatal hernia.\"  On omeprazole 20 bid x 1.5 months.      PLAN: 1.) US abdomen                2.) EGD with MAC (monitored anesthesia care) at  [facility] [patient prefers to have this done at [place] with Dr. Cook] -- The indication and risks of the procedure d/w pt.                3.) cont bid ppi

## 2024-12-06 ENCOUNTER — HOSPITAL ENCOUNTER (OUTPATIENT)
Facility: HOSPITAL | Age: 84
Setting detail: HOSPITAL OUTPATIENT SURGERY
Discharge: HOME | End: 2024-12-06
Attending: INTERNAL MEDICINE | Admitting: INTERNAL MEDICINE
Payer: MEDICARE

## 2024-12-06 ENCOUNTER — ANESTHESIA (OUTPATIENT)
Dept: ENDOSCOPY | Facility: HOSPITAL | Age: 84
End: 2024-12-06
Payer: MEDICARE

## 2024-12-06 ENCOUNTER — ANESTHESIA EVENT (OUTPATIENT)
Dept: ENDOSCOPY | Facility: HOSPITAL | Age: 84
End: 2024-12-06
Payer: MEDICARE

## 2024-12-06 VITALS
HEIGHT: 67 IN | RESPIRATION RATE: 16 BRPM | SYSTOLIC BLOOD PRESSURE: 159 MMHG | BODY MASS INDEX: 23.23 KG/M2 | HEART RATE: 61 BPM | OXYGEN SATURATION: 95 % | DIASTOLIC BLOOD PRESSURE: 56 MMHG | TEMPERATURE: 98 F | WEIGHT: 148 LBS

## 2024-12-06 PROCEDURE — 0D748ZZ DILATION OF ESOPHAGOGASTRIC JUNCTION, VIA NATURAL OR ARTIFICIAL OPENING ENDOSCOPIC: ICD-10-PCS | Performed by: INTERNAL MEDICINE

## 2024-12-06 PROCEDURE — 0DB68ZX EXCISION OF STOMACH, VIA NATURAL OR ARTIFICIAL OPENING ENDOSCOPIC, DIAGNOSTIC: ICD-10-PCS | Performed by: INTERNAL MEDICINE

## 2024-12-06 PROCEDURE — 88305 TISSUE EXAM BY PATHOLOGIST: CPT | Performed by: INTERNAL MEDICINE

## 2024-12-06 PROCEDURE — 0DB58ZX EXCISION OF ESOPHAGUS, VIA NATURAL OR ARTIFICIAL OPENING ENDOSCOPIC, DIAGNOSTIC: ICD-10-PCS | Performed by: INTERNAL MEDICINE

## 2024-12-06 RX ORDER — NALOXONE HYDROCHLORIDE 0.4 MG/ML
0.08 INJECTION, SOLUTION INTRAMUSCULAR; INTRAVENOUS; SUBCUTANEOUS AS NEEDED
Status: DISCONTINUED | OUTPATIENT
Start: 2024-12-06 | End: 2024-12-06

## 2024-12-06 RX ORDER — HYDROMORPHONE HYDROCHLORIDE 1 MG/ML
0.2 INJECTION, SOLUTION INTRAMUSCULAR; INTRAVENOUS; SUBCUTANEOUS EVERY 5 MIN PRN
Status: DISCONTINUED | OUTPATIENT
Start: 2024-12-06 | End: 2024-12-06

## 2024-12-06 RX ORDER — LIDOCAINE HYDROCHLORIDE 10 MG/ML
INJECTION, SOLUTION EPIDURAL; INFILTRATION; INTRACAUDAL; PERINEURAL AS NEEDED
Status: DISCONTINUED | OUTPATIENT
Start: 2024-12-06 | End: 2024-12-06 | Stop reason: SURG

## 2024-12-06 RX ORDER — HYDROMORPHONE HYDROCHLORIDE 1 MG/ML
0.6 INJECTION, SOLUTION INTRAMUSCULAR; INTRAVENOUS; SUBCUTANEOUS EVERY 5 MIN PRN
Status: DISCONTINUED | OUTPATIENT
Start: 2024-12-06 | End: 2024-12-06

## 2024-12-06 RX ORDER — SODIUM CHLORIDE, SODIUM LACTATE, POTASSIUM CHLORIDE, CALCIUM CHLORIDE 600; 310; 30; 20 MG/100ML; MG/100ML; MG/100ML; MG/100ML
INJECTION, SOLUTION INTRAVENOUS CONTINUOUS
Status: DISCONTINUED | OUTPATIENT
Start: 2024-12-06 | End: 2024-12-06

## 2024-12-06 RX ORDER — HYDROMORPHONE HYDROCHLORIDE 1 MG/ML
0.4 INJECTION, SOLUTION INTRAMUSCULAR; INTRAVENOUS; SUBCUTANEOUS EVERY 5 MIN PRN
Status: DISCONTINUED | OUTPATIENT
Start: 2024-12-06 | End: 2024-12-06

## 2024-12-06 RX ORDER — ONDANSETRON 2 MG/ML
4 INJECTION INTRAMUSCULAR; INTRAVENOUS EVERY 6 HOURS PRN
Status: DISCONTINUED | OUTPATIENT
Start: 2024-12-06 | End: 2024-12-06

## 2024-12-06 RX ORDER — METOCLOPRAMIDE HYDROCHLORIDE 5 MG/ML
10 INJECTION INTRAMUSCULAR; INTRAVENOUS EVERY 8 HOURS PRN
Status: DISCONTINUED | OUTPATIENT
Start: 2024-12-06 | End: 2024-12-06

## 2024-12-06 RX ADMIN — SODIUM CHLORIDE, SODIUM LACTATE, POTASSIUM CHLORIDE, CALCIUM CHLORIDE: 600; 310; 30; 20 INJECTION, SOLUTION INTRAVENOUS at 10:29:00

## 2024-12-06 RX ADMIN — LIDOCAINE HYDROCHLORIDE 25 MG: 10 INJECTION, SOLUTION EPIDURAL; INFILTRATION; INTRACAUDAL; PERINEURAL at 10:10:00

## 2024-12-06 NOTE — ANESTHESIA POSTPROCEDURE EVALUATION
[facility]    Emma Trevizo Patient Status:  Hospital Outpatient Surgery   Age/Gender 84 year old female MRN [id number]   Location [facility] Attending Yaya Cook MD   Hosp Day # 0 PCP Cathy Martinez MD       Anesthesia Post-op Note    ESOPHAGOGASTRODUODENOSCOPY (EGD) with biopsies and balloon dilation to 20mm    Procedure Summary       Date: 12/06/24 Room / Location:  ENDOSCOPY 03 / [facility]    Anesthesia Start: 1007 Anesthesia Stop: 1029    Procedure: ESOPHAGOGASTRODUODENOSCOPY (EGD) with biopsies and balloon dilation to 20mm Diagnosis: (Schatzkis ring, hiatal hernia)    Surgeons: Yaya Cook MD Anesthesiologist: James Blank MD    Anesthesia Type: MAC ASA Status: 2            Anesthesia Type: MAC    Vitals Value Taken Time   /36 12/06/24 1030   Temp 98.3 °F (36.8 °C) 12/06/24 1030   Pulse 64 12/06/24 1030   Resp 16 12/06/24 1030   SpO2 97 % 12/06/24 1030       Patient Location: Endoscopy    Anesthesia Type: MAC    Airway Patency: patent    Postop Pain Control: adequate    Mental Status: mildly sedated but able to meaningfully participate in the post-anesthesia evaluation    Nausea/Vomiting: none    Cardiopulmonary/Hydration status: stable euvolemic    Complications: no apparent anesthesia related complications    Postop vital signs: stable    Dental Exam: Unchanged from Preop    Patient to be discharged home when criteria met.

## 2024-12-06 NOTE — DISCHARGE INSTRUCTIONS
ENDOSCOPY DISCHARGE INSTRUCTIONS    Procedure Performed:   Gastroscopy and Esophageal Dilatation  Endoscopist: No name on file  FINDINGS:   Esophageal stricture (narrowing in esophagus), Hiatal hernia (stomach slides up into chest through diaphragm), and Gastritis (inflammation of the stomach lining)    MEDICATIONS:  You may resume all other medications today    DIET:  General    BIOPSIES:  Biopsies were taken (you will be notified of results in 7-10 days)      ADDITIONAL RECOMMENDATIONS:  Continue omeprazole 20 mg daily.  Follow up with Dr. Mijares in 1 - 2 months.    Activity for remainder of today:    REST TODAY  DO NOT drive or operate heavy machinery  DO NOT drink any alcoholic beverages  DO NOT sign any legal documents or make any important decisions    After your procedure(s):  It is not unusual to feel bloated or gassy .  Passing gas and belching is encouraged. Lying on your left side with your knees flexed may relieve the discomfort. A hot pack to the abdomen may also help.    After your gastroscopy (upper endoscopy): You may experience a slight sore throat which will subside. Throat lozenges or salt water gargle can be used.    FOLLOW-UP:  Contact the office at [phone] for follow-up appointment if needed or if you develop any of the following:    Severe abdominal pain/discomfort       Excessive bleeding or black tarry stool  Difficulty breathing or swallowing        Persistent nausea,vomiting, or a fever above 100 degrees or chills

## 2024-12-06 NOTE — ANESTHESIA PREPROCEDURE EVALUATION
PRE-OP EVALUATION    Patient Name: Emma Trevizo    Admit Diagnosis: ESOPHAGEAL DYSPHAGIA; WEIGHT LOSS    Pre-op Diagnosis: ESOPHAGEAL DYSPHAGIA; WEIGHT LOSS    ESOPHAGOGASTRODUODENOSCOPY (EGD)    Anesthesia Procedure: ESOPHAGOGASTRODUODENOSCOPY (EGD)    Surgeons and Role:     * Yaya Cook MD - Primary    Pre-op vitals reviewed.        Body mass index is 23.18 kg/m².    Current medications reviewed.  Hospital Medications:  No current facility-administered medications on file as of 12/6/2024.       Outpatient Medications:   Prescriptions Prior to Admission[1]    Allergies: Adhesive tape and Latex      Anesthesia Evaluation    Patient summary reviewed.    Anesthetic Complications           GI/Hepatic/Renal      (+) GERD                           Cardiovascular                  (+) hypertension     (+) CAD                                Endo/Other                                  Pulmonary               (+) shortness of breath            Neuro/Psych                                      Past Surgical History:   Procedure Laterality Date    Appendectomy      Biopsy of breast, incisional      L Breast    Cataract      Cholecystectomy  2005    Colonoscopy,biopsy  02/13/2006    nonspecific erythema transverse colon (bx marked acute & chronic colitis), ileum wnl    Colonoscopy,diagnostic  1990    mild inflammatory change sigmoid colon    Colonoscopy,diagnostic  02/03/2010    Dr. Rodarte, mild erythema of the rectosigmoid, possibly a variant of normal, bx show collagenous colitis, ileum normal    Egd N/A 08/09/2021    Procedure: ESOPHAGOGASTRODUODENOSCOPY WITH DILATION with bxs, COLONOSCOPY with bxs;  Surgeon: Yaya Cook MD;  [facility]    Hemorrhoidectomy,int/ext,simple      Needle biopsy left      pt cannot remember    Needle biopsy right      pt cannot remember    Other ambl surg  2005    R leg tendon surgery with post-op wound infection    Other surgical history      right ankle  surgery    Other surgical history  05/07/2013     left 1st metatarsophalangeal fusion. Weil osteotomy 2nd metatarsal joint and metatarsophalgeal capsulotomy and extensor digitorum longus lengthening ans 2nd hammer toe. 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    Skin surgery Left 11/19/2015    ESC of SCC to left lateral malleolus    Skin surgery  03/29/2022    BCC- left posterior postauricular neck, MMS     Total abdom hysterectomy      w/BSO    Up gi endoscopy,ball dil,30mm  05/04/2009    Up gi endoscopy,ball dil,30mm  06/12/2014    distal esophageal stricture, bx and dilated 18-20 mm    Upper gi endoscopy,biopsy  05/04/2009    antral gastric erosions, distal esophageal stricture, gastric bx, esophageal dilation 18-20 mm, Performed by RACHEL GLEASON at [facility], [facility], Performed by RACHEL GLEASON at [facility]    Upper gi endoscopy,biopsy  02/2010    3 gastric ulcers from diclofenac, bx for H. pylori was negative    Upper gi endoscopy,diagnosis  1990    wnl     Social History     Socioeconomic History    Marital status:    Tobacco Use    Smoking status: Never    Smokeless tobacco: Never    Tobacco comments:     non-smoker   Vaping Use    Vaping status: Never Used   Substance and Sexual Activity    Alcohol use: Yes     Comment: \"rare\"    Drug use: No     History   Drug Use No     Available pre-op labs reviewed.               Airway      Mallampati: I  Mouth opening: >3 FB  TM distance: > 6 cm  Neck ROM: full Cardiovascular    Cardiovascular exam normal.  Rhythm: regular  Rate: normal     Dental             Pulmonary    Pulmonary exam normal.                 Other findings              ASA: 2   Plan: MAC  NPO  status verified and patient meets guidelines.          Plan/risks discussed with: patient and significant other                Present on Admission:  **None**             [1]   Medications Prior to Admission   Medication Sig Dispense Refill Last Dose/Taking    albuterol 108 (90 Base) MCG/ACT Inhalation Aero Soln Inhale 2 puffs into the lungs every 4 (four) hours as needed for Wheezing.   Taking As Needed    gabapentin 100 MG Oral Cap Take 1 capsule (100 mg total) by mouth 3 (three) times daily as needed (pain).   Taking As Needed    simvastatin 20 MG Oral Tab Take 1 tablet (20 mg total) by mouth nightly.   Taking    losartan 50 MG Oral Tab Take 1 tablet (50 mg total) by mouth 2 (two) times daily.   Taking    cefadroxil 500 MG Oral Cap Take 2 capsules (1,000 mg total) by mouth 2 (two) times daily.   Taking    metoprolol succinate ER 25 MG Oral Tablet 24 Hr Take 1 tablet (25 mg total) by mouth in the morning and 1 tablet (25 mg total) before bedtime. 2 TABS EVERY MORNING AND 1 TAN IN EVENING.   Taking    omeprazole 20 MG Oral Capsule Delayed Release Take 1 capsule (20 mg total) by mouth every morning before breakfast.   Taking    Methotrexate Sodium 5 MG Oral Tab Take 8 tablets (40 mg total) by mouth every 7 days. Pt takes every monday   Taking    FOLIC ACID 1 MG Oral Tab Take 1 tablet (1 mg total) by mouth daily. 90 tablet 3 Taking    sulfaSALAzine 500 MG Oral Tab Take 2 tablets (1,000 mg total) by mouth 2 (two) times daily. 360 tablet 1 Taking    acetaminophen 500 MG Oral Tab Take 2 tablets (1,000 mg total) by mouth every 6 (six) hours as needed for Pain.   Taking As Needed    Calcium Citrate-Vitamin D 500-400 MG-UNIT Oral Chew Tab Chew by mouth 2 (two) times daily. Vitamin D 5000 iu daily    Taking    Cholecalciferol (VITAMIN D) 1000 UNIT Oral Cap Take by mouth. 2000 DAILY   Taking    HYDROmorphone 2 MG Oral Tab Take 1 tablet (2 mg total) by mouth every 4 (four) hours as needed for Pain. (Patient not taking:  Reported on 2/9/2024) 20 tablet 0

## 2025-04-30 ENCOUNTER — HOSPITAL ENCOUNTER (INPATIENT)
Facility: HOSPITAL | Age: 85
LOS: 2 days | Discharge: HOME HEALTH | End: 2025-05-02
Attending: EMERGENCY MEDICINE | Admitting: HOSPITALIST
Payer: MEDICARE

## 2025-04-30 ENCOUNTER — APPOINTMENT (OUTPATIENT)
Dept: GENERAL RADIOLOGY | Facility: HOSPITAL | Age: 85
End: 2025-04-30
Attending: PHYSICIAN ASSISTANT
Payer: MEDICARE

## 2025-04-30 DIAGNOSIS — J45.909 REACTIVE AIRWAY DISEASE WITHOUT COMPLICATION, UNSPECIFIED ASTHMA SEVERITY, UNSPECIFIED WHETHER PERSISTENT (HCC): ICD-10-CM

## 2025-04-30 DIAGNOSIS — B34.9 VIRAL SYNDROME: Primary | ICD-10-CM

## 2025-04-30 DIAGNOSIS — R79.89 ELEVATED BRAIN NATRIURETIC PEPTIDE (BNP) LEVEL: ICD-10-CM

## 2025-04-30 DIAGNOSIS — R03.0 ELEVATED BLOOD PRESSURE READING: ICD-10-CM

## 2025-04-30 LAB
ALBUMIN SERPL-MCNC: 4.7 G/DL (ref 3.2–4.8)
ALBUMIN/GLOB SERPL: 2 {RATIO} (ref 1–2)
ALP LIVER SERPL-CCNC: 51 U/L (ref 55–142)
ALT SERPL-CCNC: 12 U/L (ref 10–49)
ANION GAP SERPL CALC-SCNC: 7 MMOL/L (ref 0–18)
AST SERPL-CCNC: 19 U/L (ref ?–34)
ATRIAL RATE: 66 BPM
BASOPHILS # BLD AUTO: 0.02 X10(3) UL (ref 0–0.2)
BASOPHILS NFR BLD AUTO: 0.4 %
BILIRUB SERPL-MCNC: 0.5 MG/DL (ref 0.2–1.1)
BUN BLD-MCNC: 24 MG/DL (ref 9–23)
CALCIUM BLD-MCNC: 9.9 MG/DL (ref 8.7–10.6)
CHLORIDE SERPL-SCNC: 108 MMOL/L (ref 98–112)
CO2 SERPL-SCNC: 28 MMOL/L (ref 21–32)
CREAT BLD-MCNC: 0.62 MG/DL (ref 0.55–1.02)
EGFRCR SERPLBLD CKD-EPI 2021: 87 ML/MIN/1.73M2 (ref 60–?)
EOSINOPHIL # BLD AUTO: 0.02 X10(3) UL (ref 0–0.7)
EOSINOPHIL NFR BLD AUTO: 0.4 %
ERYTHROCYTE [DISTWIDTH] IN BLOOD BY AUTOMATED COUNT: 11.7 %
FLUAV + FLUBV RNA SPEC NAA+PROBE: NEGATIVE
FLUAV + FLUBV RNA SPEC NAA+PROBE: NEGATIVE
GLOBULIN PLAS-MCNC: 2.4 G/DL (ref 2–3.5)
GLUCOSE BLD-MCNC: 90 MG/DL (ref 70–99)
HCT VFR BLD AUTO: 36.3 % (ref 35–48)
HGB BLD-MCNC: 12.3 G/DL (ref 12–16)
IMM GRANULOCYTES # BLD AUTO: 0.02 X10(3) UL (ref 0–1)
IMM GRANULOCYTES NFR BLD: 0.4 %
LYMPHOCYTES # BLD AUTO: 1.93 X10(3) UL (ref 1–4)
LYMPHOCYTES NFR BLD AUTO: 39.4 %
MCH RBC QN AUTO: 34.1 PG (ref 26–34)
MCHC RBC AUTO-ENTMCNC: 33.9 G/DL (ref 31–37)
MCV RBC AUTO: 100.6 FL (ref 80–100)
MONOCYTES # BLD AUTO: 0.33 X10(3) UL (ref 0.1–1)
MONOCYTES NFR BLD AUTO: 6.7 %
NEUTROPHILS # BLD AUTO: 2.58 X10 (3) UL (ref 1.5–7.7)
NEUTROPHILS # BLD AUTO: 2.58 X10(3) UL (ref 1.5–7.7)
NEUTROPHILS NFR BLD AUTO: 52.7 %
NT-PROBNP SERPL-MCNC: 1007 PG/ML (ref ?–450)
OSMOLALITY SERPL CALC.SUM OF ELEC: 300 MOSM/KG (ref 275–295)
P AXIS: 47 DEGREES
P-R INTERVAL: 146 MS
PLATELET # BLD AUTO: 135 10(3)UL (ref 150–450)
PLATELETS.RETICULATED NFR BLD AUTO: 4.7 % (ref 0–7)
POTASSIUM SERPL-SCNC: 4.1 MMOL/L (ref 3.5–5.1)
PROT SERPL-MCNC: 7.1 G/DL (ref 5.7–8.2)
Q-T INTERVAL: 398 MS
QRS DURATION: 78 MS
QTC CALCULATION (BEZET): 417 MS
R AXIS: -15 DEGREES
RBC # BLD AUTO: 3.61 X10(6)UL (ref 3.8–5.3)
RSV RNA SPEC NAA+PROBE: NEGATIVE
SARS-COV-2 RNA RESP QL NAA+PROBE: NOT DETECTED
SODIUM SERPL-SCNC: 143 MMOL/L (ref 136–145)
T AXIS: 13 DEGREES
TROPONIN I SERPL HS-MCNC: 4 NG/L (ref ?–34)
VENTRICULAR RATE: 66 BPM
WBC # BLD AUTO: 4.9 X10(3) UL (ref 4–11)

## 2025-04-30 PROCEDURE — 71046 X-RAY EXAM CHEST 2 VIEWS: CPT | Performed by: PHYSICIAN ASSISTANT

## 2025-04-30 PROCEDURE — 85025 COMPLETE CBC W/AUTO DIFF WBC: CPT | Performed by: PHYSICIAN ASSISTANT

## 2025-04-30 PROCEDURE — 99285 EMERGENCY DEPT VISIT HI MDM: CPT

## 2025-04-30 PROCEDURE — 93010 ELECTROCARDIOGRAM REPORT: CPT

## 2025-04-30 PROCEDURE — 84484 ASSAY OF TROPONIN QUANT: CPT | Performed by: PHYSICIAN ASSISTANT

## 2025-04-30 PROCEDURE — 83880 ASSAY OF NATRIURETIC PEPTIDE: CPT | Performed by: PHYSICIAN ASSISTANT

## 2025-04-30 PROCEDURE — 94644 CONT INHLJ TX 1ST HOUR: CPT

## 2025-04-30 PROCEDURE — 93005 ELECTROCARDIOGRAM TRACING: CPT

## 2025-04-30 PROCEDURE — 0202U NFCT DS 22 TRGT SARS-COV-2: CPT | Performed by: HOSPITALIST

## 2025-04-30 PROCEDURE — 80053 COMPREHEN METABOLIC PANEL: CPT | Performed by: PHYSICIAN ASSISTANT

## 2025-04-30 PROCEDURE — 0241U SARS-COV-2/FLU A AND B/RSV BY PCR (GENEXPERT): CPT

## 2025-04-30 PROCEDURE — 96374 THER/PROPH/DIAG INJ IV PUSH: CPT

## 2025-04-30 RX ORDER — CLONIDINE HYDROCHLORIDE 0.1 MG/1
0.1 TABLET ORAL EVERY 12 HOURS PRN
Status: ACTIVE | OUTPATIENT
Start: 2025-04-30 | End: 2025-04-30

## 2025-04-30 RX ORDER — METHOTREXATE 2.5 MG/1
17.5 TABLET ORAL WEEKLY
COMMUNITY

## 2025-04-30 RX ORDER — METHOTREXATE 2.5 MG/1
40 TABLET ORAL
Status: DISCONTINUED | OUTPATIENT
Start: 2025-05-05 | End: 2025-05-01

## 2025-04-30 RX ORDER — GABAPENTIN 100 MG/1
100 CAPSULE ORAL 3 TIMES DAILY PRN
Status: DISCONTINUED | OUTPATIENT
Start: 2025-04-30 | End: 2025-04-30

## 2025-04-30 RX ORDER — METOPROLOL SUCCINATE 25 MG/1
25 TABLET, EXTENDED RELEASE ORAL NIGHTLY
COMMUNITY

## 2025-04-30 RX ORDER — PANTOPRAZOLE SODIUM 40 MG/1
40 TABLET, DELAYED RELEASE ORAL
Status: DISCONTINUED | OUTPATIENT
Start: 2025-05-01 | End: 2025-05-02

## 2025-04-30 RX ORDER — LOSARTAN POTASSIUM 50 MG/1
50 TABLET ORAL 2 TIMES DAILY
Status: DISCONTINUED | OUTPATIENT
Start: 2025-04-30 | End: 2025-05-02

## 2025-04-30 RX ORDER — HYDRALAZINE HYDROCHLORIDE 20 MG/ML
10 INJECTION INTRAMUSCULAR; INTRAVENOUS EVERY 6 HOURS PRN
Status: DISCONTINUED | OUTPATIENT
Start: 2025-04-30 | End: 2025-05-02

## 2025-04-30 RX ORDER — FOLIC ACID 1 MG/1
1 TABLET ORAL DAILY
Status: DISCONTINUED | OUTPATIENT
Start: 2025-04-30 | End: 2025-05-02

## 2025-04-30 RX ORDER — SULFASALAZINE 500 MG/1
1000 TABLET ORAL 2 TIMES DAILY
Status: DISCONTINUED | OUTPATIENT
Start: 2025-04-30 | End: 2025-05-02

## 2025-04-30 RX ORDER — ACETAMINOPHEN 500 MG
500 TABLET ORAL EVERY 4 HOURS PRN
Status: DISCONTINUED | OUTPATIENT
Start: 2025-04-30 | End: 2025-05-02

## 2025-04-30 RX ORDER — IPRATROPIUM BROMIDE AND ALBUTEROL SULFATE 2.5; .5 MG/3ML; MG/3ML
3 SOLUTION RESPIRATORY (INHALATION) EVERY 6 HOURS PRN
Status: DISCONTINUED | OUTPATIENT
Start: 2025-04-30 | End: 2025-05-02

## 2025-04-30 RX ORDER — PREDNISONE 20 MG/1
40 TABLET ORAL
Status: DISCONTINUED | OUTPATIENT
Start: 2025-05-01 | End: 2025-05-02

## 2025-04-30 RX ORDER — ALBUTEROL SULFATE 5 MG/ML
10 SOLUTION RESPIRATORY (INHALATION) ONCE
Status: COMPLETED | OUTPATIENT
Start: 2025-04-30 | End: 2025-04-30

## 2025-04-30 RX ORDER — ATORVASTATIN CALCIUM 10 MG/1
10 TABLET, FILM COATED ORAL NIGHTLY
Status: DISCONTINUED | OUTPATIENT
Start: 2025-04-30 | End: 2025-05-02

## 2025-04-30 RX ORDER — ONDANSETRON 2 MG/ML
4 INJECTION INTRAMUSCULAR; INTRAVENOUS EVERY 6 HOURS PRN
Status: DISCONTINUED | OUTPATIENT
Start: 2025-04-30 | End: 2025-05-02

## 2025-04-30 RX ORDER — METHYLPREDNISOLONE SODIUM SUCCINATE 125 MG/2ML
125 INJECTION INTRAMUSCULAR; INTRAVENOUS ONCE
Status: COMPLETED | OUTPATIENT
Start: 2025-04-30 | End: 2025-04-30

## 2025-04-30 RX ORDER — ALBUTEROL SULFATE 90 UG/1
2 INHALANT RESPIRATORY (INHALATION) EVERY 4 HOURS PRN
Status: DISCONTINUED | OUTPATIENT
Start: 2025-04-30 | End: 2025-05-02

## 2025-04-30 RX ORDER — METOPROLOL SUCCINATE 25 MG/1
25 TABLET, EXTENDED RELEASE ORAL 2 TIMES DAILY
Status: DISCONTINUED | OUTPATIENT
Start: 2025-04-30 | End: 2025-05-01

## 2025-04-30 RX ORDER — METOCLOPRAMIDE HYDROCHLORIDE 5 MG/ML
10 INJECTION INTRAMUSCULAR; INTRAVENOUS EVERY 8 HOURS PRN
Status: DISCONTINUED | OUTPATIENT
Start: 2025-04-30 | End: 2025-05-02

## 2025-04-30 RX ORDER — ACETAMINOPHEN 500 MG
1000 TABLET ORAL EVERY 6 HOURS PRN
Status: DISCONTINUED | OUTPATIENT
Start: 2025-04-30 | End: 2025-05-02

## 2025-04-30 RX ORDER — HEPARIN SODIUM 5000 [USP'U]/ML
5000 INJECTION, SOLUTION INTRAVENOUS; SUBCUTANEOUS EVERY 8 HOURS SCHEDULED
Status: DISCONTINUED | OUTPATIENT
Start: 2025-04-30 | End: 2025-05-02

## 2025-04-30 RX ORDER — LATANOPROST 50 UG/ML
1 SOLUTION/ DROPS OPHTHALMIC NIGHTLY
COMMUNITY

## 2025-04-30 NOTE — ED PROVIDER NOTES
Patient Seen in: [facility] Emergency Department      History     Chief Complaint   Patient presents with    Hypertension    Cough/URI     Stated Complaint: HTN    Subjective:   HPI    This is a 84-year-old woman here for evaluation of 1 week persistent cough.  States multiple other residents at her facility with cough and cold symptoms recently.  Patient Nuys any fevers any chest pain.  States her coughing episodes will become so frequent and bothersome that she is unable to eat or drink normally.  She denies any leg swelling any difficulty sleeping, fevers vomiting diarrhea abdominal pain any other complaints or concerns.  History of Present Illness               Objective:     Past Medical History:    Ankylosing spondylitis (HCC)    BACK SHOULDERS ARMS- receives an infusion  every 2 mos    Aortic atherosclerosis    Chart review.  Noted on CT 3/1/18.    Arrhythmia    PALPATATIONS    Arteritis, unspecified    Left     Back problem    Blind    Calculus of kidney    Kidney stones 30 years ago    Cataract    Bilateral    Cholelithiasis    Collagenous colitis    Diffuse cystic mastopathy    Disorder of bone and cartilage, unspecified    osteopenia    Elevated blood pressure reading without diagnosis of hypertension    Esophageal reflux    Glaucoma    left eye    High blood pressure    High cholesterol    Hx of motion sickness    Indeterminate PPD    Father had TB, seen by Dr. 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 Rodarte, mild erythema of the rectosigmoid, possibly a variant of normal, bx show collagenous colitis, ileum normal    Egd N/A 08/09/2021    Procedure: ESOPHAGOGASTRODUODENOSCOPY WITH DILATION with bxs, COLONOSCOPY with bxs;  Surgeon: Yaya Cook MD;  Location: [facility]    Hemorrhoidectomy,int/ext,simple      Needle biopsy left      pt cannot remember    Needle biopsy right      pt cannot remember    Other ambl surg  2005    R leg tendon surgery with post-op wound infection    Other surgical history      right ankle surgery    Other surgical history  05/07/2013     left 1st metatarsophalangeal fusion. Weil osteotomy 2nd metatarsal joint and metatarsophalgeal capsulotomy and extensor digitorum longus lengthening ans 2nd hammer toe. 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    Skin surgery Left 11/19/2015    ESC of SCC to left lateral malleolus    Skin surgery  03/29/2022    BCC- left posterior postauricular neck, MMS     Total abdom hysterectomy      w/BSO    Up gi endoscopy,ball dil,30mm  05/04/2009    Up gi endoscopy,ball dil,30mm  06/12/2014    distal esophageal stricture, bx and dilated 18-20 mm    Upper gi endoscopy,biopsy  05/04/2009    antral gastric erosions, distal esophageal stricture, gastric bx, esophageal dilation 18-20 mm, Performed by RACHEL GLEASON at [facility],  Performed by R[facility]HEL [facility]ON [facility] [place]    Upper gi endoscopy,biopsy  02/2010    3 gastric ulcers from diclofenac, bx for H. pylori was negative    Upper gi endoscopy,diagnosis  1990    wnl                Social History     Socioeconomic History    Marital status:    Tobacco Use    Smoking status: Never    Smokeless tobacco: Never    Tobacco comments:     non-smoker   Vaping Use    Vaping status: Never Used   Substance and Sexual Activity    Alcohol use: Yes     Comment: \"rare\"    Drug use: No   Social History Narrative    : yes    Children: no    Exercise: walks    Employment: retired  @     Caffeine intake: moderate                 Social Drivers of Health     Food Insecurity: No Food Insecurity (4/30/2025)    NCSS - Food Insecurity     Worried About Running Out of Food in the Last Year: No     Ran Out of Food in the Last Year: No   Transportation Needs: No Transportation Needs (4/30/2025)    NCSS - Transportation     Lack of Transportation: No   Housing Stability: Not At Risk (4/30/2025)    NCSS - Housing/Utilities     Has Housing: Yes     Worried About Losing Housing: No     Unable to Get Utilities: No                                Physical Exam     ED Triage Vitals [04/30/25 1301]   BP (!) 218/71   Pulse 69   Resp 26   Temp 98.1 °F (36.7 °C)   Temp src Oral   SpO2 97 %   O2 Device None (Room air)       Current Vitals:   Vital Signs  BP: (!) 167/65  Pulse: 71  Resp: 18  Temp: 97.9 °F (36.6 °C)  Temp src: Oral  MAP (mmHg): 92    Oxygen Therapy  SpO2: 95 %  O2 Device: None (Room air)  O2 Flow Rate (L/min): 0 L/min  Pulse Oximetry Type: Intermittent  Oximetry Probe Site Changed: No        Physical Exam      Physical Exam  Vitals signs and nursing note reviewed.   General:  Patient laying supine in the bed in no acute distress  Head: Normocephalic and atraumatic.   HEENT:  Mucous membranes are moist.   Cardiovascular:  Normal rate and regular rhythm.  No  Edema  Pulmonary:  Pulmonary effort is normal.  Faint expiratory wheezing bilateral lung fields, good air entry bilaterally.  No rales  Abdominal: Soft nontender nondistended, normal bowel sounds, no guarding no rebound tenderness  Skin: Warm and dry  Neurological: Awake alert, speech is normal      Physical Exam                ED Course     Labs Reviewed   CBC WITH DIFFERENTIAL WITH PLATELET - Abnormal; Notable for the following components:       Result Value    RBC 3.61 (*)     .0 (*)     .6 (*)     MCH 34.1 (*)     All other components within normal limits   COMP METABOLIC PANEL (14) - Abnormal; Notable for the following components:    BUN 24 (*)     Calculated Osmolality 300 (*)     Alkaline Phosphatase 51 (*)     All other components within normal limits   PRO BETA NATRIURETIC PEPTIDE - Abnormal; Notable for the following components:    Pro-Beta Natriuretic Peptide 1,007 (*)     All other components within normal limits   TROPONIN I HIGH SENSITIVITY - Normal   SARS-COV-2/FLU A AND B/RSV BY PCR (GENEXPERT) - Normal    Narrative:     This test is intended for the qualitative detection and differentiation of SARS-CoV-2, influenza A, influenza B, and respiratory syncytial virus (RSV) viral RNA in nasopharyngeal or nares swabs from individuals suspected of respiratory viral infection consistent with COVID-19 by their healthcare provider. Signs and symptoms of respiratory viral infection due to SARS-CoV-2, influenza, and RSV can be similar.    Test performed using the Xpert Xpress SARS-CoV-2/FLU/RSV (real time RT-PCR)  assay on the GeneXpert instrument, [employer], [place] [address].   This test is being used under the Food and Drug Administration's Emergency Use Authorization.    The authorized Fact Sheet for Healthcare Providers for this assay is available upon request from the laboratory.   BASIC METABOLIC PANEL (8)   MAGNESIUM   CBC WITH DIFFERENTIAL WITH PLATELET   RAINBOW DRAW LAVENDER   RAINBOW  DRAW LIGHT GREEN   RAINBOW DRAW BLUE   RESPIRATORY FLU EXPAND PANEL + COVID-19     EKG    Rate, intervals and axes as noted on EKG Report.  Rate: 66  Rhythm: Sinus Rhythm  Reading: No acute ischemic changes              Results        XR CHEST PA + LAT CHEST (QJA=80744)  Result Date: 4/30/2025  PROCEDURE:  XR CHEST PA + LAT CHEST (CPT=71046)  INDICATIONS:  HTN  COMPARISON:  [facility] , XR, XR CHEST AP PORTABLE  (CPT=71045), 1/20/2024, 1:02 PM.  TECHNIQUE:  PA and lateral chest radiographs were obtained.  PATIENT STATED HISTORY: (As transcribed by Technologist)  The patient offered no additional history at this point.    FINDINGS:  Lung volumes are satisfactory.  No new consolidation or pleural effusion.  Heart and pulmonary vessels appear stable, with mild cardiomegaly.  Tortuosity and ectasia of the thoracic aorta is similar to the prior.  Smooth mediastinal contours.  Prior 4 level vertebral augmentation.              CONCLUSION:  No evidence of active cardiopulmonary disease.   LOCATION:  [facility]   Dictated by (CST): Parish Snyder MD on 4/30/2025 at 2:16 PM     Finalized by (CST): Parish Snyder MD on 4/30/2025 at 2:17 PM                            MDM      This is a 84-year-old woman here for evaluation of cough shortness of breath.  Differential includes pneumonia, viral syndrome, has wheezing on exam O2 saturation is 97% on room air blood pressure is elevated, did improve without intervention.  COVID influenza RSV are all negative chest x-ray without focal infiltrate, patient did not appear volume overloaded her troponin is negative basic labs otherwise unremarkable no significant change from previous.  The patient likely has a viral bronchitis, she was given steroids and albuterol nebulizer treatment with significant improvement in her symptoms and is feeling much betterBut still with significant wheezing on exam.  Blood pressure elevated.  No oxygen requirement.  Given persistent wheezing, patient somewhat  uncomfortable going home with her blood pressure elevated as it has been as well as first-time wheezing, she will be placed in observation for periodic nebulizer treatments.  Case endorsed to the [facility] hospitalist who agrees with plan for admission.      I independently viewed and interpreted the following imaging: Chest x-ray without acute infiltrate      Reviewed 1/24 internal medicine discharge summary  83 year old female who presents for evaluation of uncontrolled chronic back pain     Problem List / Diagnoses     Acute on Chronic back pain  Acute Closed T8 Compression Fracture, initial visit  Acute closed T9 compression fracture, subsequent visit  CAD  HTN  HL     Plan     Acute on Chronic back pain  Acute Closed T8 Compression Fracture, initial visit  Acute closed T9 compression fracture, subsequent visit  -No recent traumas or other inciting events that would suggest new injury  - Exam without evidence of neurologic impairment  - MRI shows new T8 endplate fracture  -- kypho to be done 1/15 delayed for 10 days due to COVID infection.  TLSO when up.  -s/p Toradol 50 mg every 6 hours as needed  - changed norco to dilaudid 2 mg po Q4 PRN for pain given that she is on paxovid (interacts with norco and percocet)  -PT/OT -> [facility]  - in discussion with IR about rescheduling kypho for 1/25 or 1/26 as outpt.  SW to be notified of the time, pt to go from [facility] to get the procedure.   1/25/24  at 2 pm - SW notified.    - given reports of worsening back pain with cough, d/w IR, check XR of T and L spine to eval for new compression, neg for new fx.      COVID-19 infection   - no hypoxia or PNA on CXR  - started on paxlovid given risk for severe disease, course completed  - schedule tessalon, cont PRN robitussin      Proteus mirabilis UTI  - pt c/o urinary symptoms   - s/p empiric CTX x 4 days  - final C+S done  - discharge to [facility] on 1 more day of duricef     CAD  HTN  HL  -Stable, resume home meds  - amlodipine dose reduced  while pt is on paxlovid, resume regular dose on discharge   - statin on hold as pt is on paxlovid, resume on discharge   - added PRN hydralazine and PRN labetalol for elevated BPs     Stable for discharge to [facility].      Consults: IP CONSULT TO HOSPITALIST  NURSING CONSULT TO DIETITIAN  IP CONSULT TO SOCIAL WORK  IP CONSULT TO PHARMACY     Operative Procedures:  None        Admission disposition: 4/30/2025  5:33 PM           Medical Decision Making      Disposition and Plan     Clinical Impression:  1. Viral syndrome    2. Reactive airway disease without complication, unspecified asthma severity, unspecified whether persistent (HCC)    3. Elevated blood pressure reading    4. Elevated brain natriuretic peptide (BNP) level         Disposition:  Admit  4/30/2025  5:33 pm    Follow-up:  No follow-up provider specified.        Medications Prescribed:  Current Discharge Medication List          Supplementary Documentation:         Hospital Problems       Present on Admission  Date Reviewed: 4/8/2022          ICD-10-CM Noted POA    * (Principal) Viral syndrome B34.9 4/30/2025 Unknown    Elevated blood pressure reading R03.0 4/30/2025 Unknown    Elevated brain natriuretic peptide (BNP) level R79.89 4/30/2025 Unknown    Reactive airway disease without complication, unspecified asthma severity, unspecified whether persistent (HCC) J45.909 4/30/2025 Unknown

## 2025-04-30 NOTE — H&P
[facility]   part of [facility]    History and Physical     Emma Trevizo Patient Status:  Emergency    1940 MRN [id number]   Location [facility] EMERGENCY DEPARTMENT Attending Orestes Adair MD   Hosp Day # 0 PCP Cathy Martinez MD     Chief Complaint: Dry cough and elevated BP    History of Present Illness: Emma Trevizo is a 84 year old female with history of ankylosing spondylitis, kidney stones, cholelithiasis, hypertension, GERD, hyperlipidemia, rheumatoid arthritis presenting with dry cough and elevated blood pressure.  Patient says that her last few days she has had a persistent cough.  The symptoms and more difficulty with.  Today she also checked her blood pressure which was significantly elevated.  As result of both the symptoms she came to the emergency room for further evaluation and treatment.  Patient denies any fevers or chills.  Patient denies any chest pain or palpitations.  Patient denies any other significant positive review of systems.    Past Medical History:Past Medical History[1]     Past Surgical History: Past Surgical History[2]    Social History:  reports that she has never smoked. She has never used smokeless tobacco. She reports current alcohol use. She reports that she does not use drugs.    Family History: Family History[3]  Mother Passed  Father passed    Allergies: Allergies[4]    Medications:  Medications Ordered Prior to Encounter[5]    Review of Systems:   A comprehensive 14 point review of systems was completed.    Pertinent positives and negatives noted in the HPI.    Physical Exam:    BP (!) 187/65   Pulse 72   Temp 98.1 °F (36.7 °C) (Oral)   Resp (!) 30   Ht 5' 6\" (1.676 m)   Wt 147 lb (66.7 kg)   SpO2 93%   BMI 23.73 kg/m²   General: No acute distress. Alert and oriented x 3.  HEENT: Normocephalic atraumatic. Moist mucous membranes. EOM-I.  Neck: No JVD. No carotid bruits.  Respiratory: Clear to auscultation bilaterally. No wheezes. No  crackles  Cardiovascular: S1, S2. Regular rate and rhythm. No murmurs  Chest and Back: No tenderness or deformity.  Abdomen: Soft, nontender, nondistended.  Positive bowel sounds. No rebound, guarding  Neurologic: No focal neurological deficits. CNII-XII grossly intact. Sensation and strength intact  Musculoskeletal: Moves all extremities.  Extremities: No edema or tenderness of the LE  Integument: No new rashes or lesions.   Psychiatric: Appropriate mood and affect.      Diagnostic Data:      Labs:  Recent Labs   Lab 04/30/25  1326   WBC 4.9   HGB 12.3   .6*   .0*       Recent Labs   Lab 04/30/25  1328   GLU 90   BUN 24*   CREATSERUM 0.62   CA 9.9   ALB 4.7      K 4.1      CO2 28.0   ALKPHO 51*   AST 19   ALT 12   BILT 0.5   TP 7.1       Estimated Creatinine Clearance: 61 mL/min (based on SCr of 0.62 mg/dL).    No results for input(s): \"PTP\", \"INR\" in the last 168 hours.    No results for input(s): \"TROP\", \"CK\" in the last 168 hours.    Imaging: Imaging data reviewed in Epic.      ASSESSMENT / PLAN:    84 year old female with history of ankylosing spondylitis, kidney stones, cholelithiasis, hypertension, GERD, hyperlipidemia, rheumatoid arthritis presenting with dry cough and elevated blood pressure.     Bronchitis  -pt with wheezing  -likely viral  -check expanded resp panel  -prednisone po  -duoneb  -supp o2 as needed    Uncontrolled HTN  -etiology uncertain  -continue home meds  -hydralazine iv prn  -cardiology consulted    Elevated BNP  -etiology uncertain  -no obvious sings of acute chf  -cardiology consulted  -check echo    Ankylosing spondylitis  RA  -methotrexate  -sulfasalazine    GERD  -pantoprazole    Quality:  DVT Prophylaxis: scd, heparin sq  CODE status: full per chart  Joseph: none    Plan of care discussed with patient and staff    Dispo: no discharge    Lucio Hoyt MD  [facility], [facility] Hospitalist  [phone]                           [1]   Past Medical History:   Ankylosing  spondylitis (HCC)    BACK SHOULDERS ARMS- receives an infusion  every 2 mos    Aortic atherosclerosis    Chart review.  Noted on CT 3/1/18.    Arrhythmia    PALPATATIONS    Arteritis, unspecified    Left     Back problem    Blind    Calculus of kidney    Kidney stones 30 years ago    Cataract    Bilateral    Cholelithiasis    Collagenous colitis    Diffuse cystic mastopathy    Disorder of bone and cartilage, unspecified    osteopenia    Elevated blood pressure reading without diagnosis of hypertension    Esophageal reflux    Glaucoma    left eye    High blood pressure    High cholesterol    Hx of motion sickness    Indeterminate PPD    Father had TB, seen by Dr. 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 Rodarte, mild erythema of the rectosigmoid, possibly a variant of normal, bx show collagenous colitis, ileum normal    Egd N/A 08/09/2021    Procedure: ESOPHAGOGASTRODUODENOSCOPY WITH DILATION with bxs, COLONOSCOPY with bxs;  Surgeon: Yaya Cook MD;  Location: [facility]    Hemorrhoidectomy,int/ext,simple      Needle biopsy left      pt cannot remember    Needle biopsy right      pt  cannot remember    Other ambl surg  2005    R leg tendon surgery with post-op wound infection    Other surgical history      right ankle surgery    Other surgical history  05/07/2013     left 1st metatarsophalangeal fusion. Weil osteotomy 2nd metatarsal joint and metatarsophalgeal capsulotomy and extensor digitorum longus lengthening ans 2nd hammer toe. 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    Skin surgery Left 11/19/2015    ESC of SCC to left lateral malleolus    Skin surgery  03/29/2022    BCC- left posterior postauricular neck, MMS     Total abdom hysterectomy      w/BSO    Up gi endoscopy,ball dil,30mm  05/04/2009    Up gi endoscopy,ball dil,30mm  06/12/2014    distal esophageal stricture, bx and dilated 18-20 mm    Upper gi endoscopy,biopsy  05/04/2009    antral gastric erosions, distal esophageal stricture, gastric bx, esophageal dilation 18-20 mm, Performed by RACHEL GLEASON at [facility] [place], [facility], Performed by RACHEL GLEASON at [facility], [facility]    Upper gi endoscopy,biopsy  02/2010    3 gastric ulcers from diclofenac, bx for H. pylori was negative    Upper gi endoscopy,diagnosis  1990    wnl   [3]   Family History  Problem Relation Age of Onset    Other (Other) Father         AAA, TB    Cancer Mother         uterus    Musculo-skelatal Disorder Brother         ankolosing spondylitis    Heart Disorder Brother         valve disorder/pacemaker    Musculo-skelatal Disorder Brother         ankylosing spondylitis    Other (viral headache) Brother    [4]   Allergies  Allergen Reactions    Adhesive Tape HIVES and OTHER (SEE COMMENTS)     ZIOpatch adhesive rash    Latex UNKNOWN     Added based on information entered during  case entry, please review and add reactions, type, and severity as needed   [5]   No current facility-administered medications on file prior to encounter.     Current Outpatient Medications on File Prior to Encounter   Medication Sig Dispense Refill    HYDROmorphone 2 MG Oral Tab Take 1 tablet (2 mg total) by mouth every 4 (four) hours as needed for Pain. (Patient not taking: Reported on 2/9/2024) 20 tablet 0    albuterol 108 (90 Base) MCG/ACT Inhalation Aero Soln Inhale 2 puffs into the lungs every 4 (four) hours as needed for Wheezing.      gabapentin 100 MG Oral Cap Take 1 capsule (100 mg total) by mouth 3 (three) times daily as needed (pain).      simvastatin 20 MG Oral Tab Take 1 tablet (20 mg total) by mouth nightly.      losartan 50 MG Oral Tab Take 1 tablet (50 mg total) by mouth 2 (two) times daily.      cefadroxil 500 MG Oral Cap Take 2 capsules (1,000 mg total) by mouth 2 (two) times daily.      metoprolol succinate ER 25 MG Oral Tablet 24 Hr Take 1 tablet (25 mg total) by mouth in the morning and 1 tablet (25 mg total) before bedtime. 2 TABS EVERY MORNING AND 1 TAN IN EVENING.      omeprazole 20 MG Oral Capsule Delayed Release Take 1 capsule (20 mg total) by mouth every morning before breakfast.      Methotrexate Sodium 5 MG Oral Tab Take 8 tablets (40 mg total) by mouth every 7 days. Pt takes every monday      FOLIC ACID 1 MG Oral Tab Take 1 tablet (1 mg total) by mouth daily. 90 tablet 3    sulfaSALAzine 500 MG Oral Tab Take 2 tablets (1,000 mg total) by mouth 2 (two) times daily. 360 tablet 1    acetaminophen 500 MG Oral Tab Take 2 tablets (1,000 mg total) by mouth every 6 (six) hours as needed for Pain.      Calcium Citrate-Vitamin D 500-400 MG-UNIT Oral Chew Tab Chew by mouth 2 (two) times daily. Vitamin D 5000 iu daily       Cholecalciferol (VITAMIN D) 1000 UNIT Oral Cap Take by mouth. 2000 DAILY

## 2025-04-30 NOTE — ED QUICK NOTES
Orders for admission, patient is aware of plan and ready to go upstairs. Any questions, please call ED RN Qiana at extension [phone].     Patient Covid vaccination status: Fully vaccinated     COVID Test Ordered in ED: SARS-CoV-2/Flu A and B/RSV by PCR (GeneXpert)    COVID Suspicion at Admission: N/A    Running Infusions: Medication Infusions[1] None    Mental Status/LOC at time of transport: x4    Other pertinent information:   CIWA score: N/A   NIH score:  N/A             [1]

## 2025-04-30 NOTE — ED INITIAL ASSESSMENT (HPI)
Patient A&Ox4 here via EMS for dizziness, weakness, cough and HTN. EMS obtained pressures initially 218/83 179/89 was their last reading. Generalized wheezing. EMS glucose 81.

## 2025-05-01 ENCOUNTER — APPOINTMENT (OUTPATIENT)
Dept: CV DIAGNOSTICS | Facility: HOSPITAL | Age: 85
End: 2025-05-01
Attending: HOSPITALIST
Payer: MEDICARE

## 2025-05-01 LAB
ADENOVIRUS PCR:: NOT DETECTED
ANION GAP SERPL CALC-SCNC: 7 MMOL/L (ref 0–18)
B PARAPERT DNA SPEC QL NAA+PROBE: NOT DETECTED
B PERT DNA SPEC QL NAA+PROBE: NOT DETECTED
BASOPHILS # BLD AUTO: 0 X10(3) UL (ref 0–0.2)
BASOPHILS NFR BLD AUTO: 0 %
BUN BLD-MCNC: 22 MG/DL (ref 9–23)
C PNEUM DNA SPEC QL NAA+PROBE: NOT DETECTED
CALCIUM BLD-MCNC: 9 MG/DL (ref 8.7–10.6)
CHLORIDE SERPL-SCNC: 108 MMOL/L (ref 98–112)
CO2 SERPL-SCNC: 26 MMOL/L (ref 21–32)
CORONAVIRUS 229E PCR:: NOT DETECTED
CORONAVIRUS HKU1 PCR:: NOT DETECTED
CORONAVIRUS NL63 PCR:: NOT DETECTED
CORONAVIRUS OC43 PCR:: NOT DETECTED
CREAT BLD-MCNC: 0.51 MG/DL (ref 0.55–1.02)
EGFRCR SERPLBLD CKD-EPI 2021: 91 ML/MIN/1.73M2 (ref 60–?)
EOSINOPHIL # BLD AUTO: 0 X10(3) UL (ref 0–0.7)
EOSINOPHIL NFR BLD AUTO: 0 %
ERYTHROCYTE [DISTWIDTH] IN BLOOD BY AUTOMATED COUNT: 11.2 %
FLUAV RNA SPEC QL NAA+PROBE: NOT DETECTED
FLUBV RNA SPEC QL NAA+PROBE: NOT DETECTED
GLUCOSE BLD-MCNC: 112 MG/DL (ref 70–99)
HCT VFR BLD AUTO: 33.1 % (ref 35–48)
HGB BLD-MCNC: 11.2 G/DL (ref 12–16)
IMM GRANULOCYTES # BLD AUTO: 0.01 X10(3) UL (ref 0–1)
IMM GRANULOCYTES NFR BLD: 0.3 %
LYMPHOCYTES # BLD AUTO: 1.25 X10(3) UL (ref 1–4)
LYMPHOCYTES NFR BLD AUTO: 35.5 %
MAGNESIUM SERPL-MCNC: 1.7 MG/DL (ref 1.6–2.6)
MCH RBC QN AUTO: 33.7 PG (ref 26–34)
MCHC RBC AUTO-ENTMCNC: 33.8 G/DL (ref 31–37)
MCV RBC AUTO: 99.7 FL (ref 80–100)
METAPNEUMOVIRUS PCR:: DETECTED
MONOCYTES # BLD AUTO: 0.27 X10(3) UL (ref 0.1–1)
MONOCYTES NFR BLD AUTO: 7.7 %
MYCOPLASMA PNEUMONIA PCR:: NOT DETECTED
NEUTROPHILS # BLD AUTO: 1.99 X10 (3) UL (ref 1.5–7.7)
NEUTROPHILS # BLD AUTO: 1.99 X10(3) UL (ref 1.5–7.7)
NEUTROPHILS NFR BLD AUTO: 56.5 %
OSMOLALITY SERPL CALC.SUM OF ELEC: 296 MOSM/KG (ref 275–295)
PARAINFLUENZA 1 PCR:: NOT DETECTED
PARAINFLUENZA 2 PCR:: NOT DETECTED
PARAINFLUENZA 3 PCR:: NOT DETECTED
PARAINFLUENZA 4 PCR:: NOT DETECTED
PLATELET # BLD AUTO: 153 10(3)UL (ref 150–450)
POTASSIUM SERPL-SCNC: 3.9 MMOL/L (ref 3.5–5.1)
RBC # BLD AUTO: 3.32 X10(6)UL (ref 3.8–5.3)
RHINOVIRUS/ENTERO PCR:: NOT DETECTED
RSV RNA SPEC QL NAA+PROBE: NOT DETECTED
SARS-COV-2 RNA NPH QL NAA+NON-PROBE: NOT DETECTED
SODIUM SERPL-SCNC: 141 MMOL/L (ref 136–145)
WBC # BLD AUTO: 3.5 X10(3) UL (ref 4–11)

## 2025-05-01 PROCEDURE — 93306 TTE W/DOPPLER COMPLETE: CPT | Performed by: HOSPITALIST

## 2025-05-01 PROCEDURE — 83735 ASSAY OF MAGNESIUM: CPT | Performed by: HOSPITALIST

## 2025-05-01 PROCEDURE — 80048 BASIC METABOLIC PNL TOTAL CA: CPT | Performed by: HOSPITALIST

## 2025-05-01 PROCEDURE — 85025 COMPLETE CBC W/AUTO DIFF WBC: CPT | Performed by: HOSPITALIST

## 2025-05-01 RX ORDER — METOPROLOL SUCCINATE 50 MG/1
50 TABLET, EXTENDED RELEASE ORAL
Status: DISCONTINUED | OUTPATIENT
Start: 2025-05-02 | End: 2025-05-02

## 2025-05-01 RX ORDER — MAGNESIUM OXIDE 400 MG/1
400 TABLET ORAL ONCE
Status: COMPLETED | OUTPATIENT
Start: 2025-05-01 | End: 2025-05-01

## 2025-05-01 RX ORDER — LATANOPROST 50 UG/ML
1 SOLUTION/ DROPS OPHTHALMIC NIGHTLY
Status: DISCONTINUED | OUTPATIENT
Start: 2025-05-01 | End: 2025-05-02

## 2025-05-01 RX ORDER — METOPROLOL SUCCINATE 25 MG/1
25 TABLET, EXTENDED RELEASE ORAL NIGHTLY
Status: DISCONTINUED | OUTPATIENT
Start: 2025-05-01 | End: 2025-05-02

## 2025-05-01 RX ORDER — AMLODIPINE BESYLATE 5 MG/1
10 TABLET ORAL DAILY
Status: DISCONTINUED | OUTPATIENT
Start: 2025-05-01 | End: 2025-05-02

## 2025-05-01 RX ORDER — METHOTREXATE 2.5 MG/1
17.5 TABLET ORAL
Status: DISCONTINUED | OUTPATIENT
Start: 2025-05-05 | End: 2025-05-02

## 2025-05-01 RX ORDER — METOPROLOL SUCCINATE 25 MG/1
25 TABLET, EXTENDED RELEASE ORAL ONCE
Status: COMPLETED | OUTPATIENT
Start: 2025-05-01 | End: 2025-05-01

## 2025-05-01 NOTE — CONSULTS
Cardiology Consult Note    Emma Trevizo Patient Status:  Inpatient    1940 MRN [id number]   Location [facility] 3SW-A Attending Lucio Hoyt MD   Hosp Day # 1 PCP Cathy Martinez MD     Reason for Consultation:  Hypertension    History of Present Illness:  Emma Trevizo is a a(n) 84 year old female that presented to ED with complaints of cough and shortness of breath. Positive for metapneumo-virus Chest XR unremarkable. Wheezing noted. BP noted to be above 200 systolic.     Impression:   HTN  HLD  Palpitations  Cough  CAD  HFpEF  Dry weight 155.  Torsemide 10 mg daily as needed     Plan:   Echo pending  HTN: Common reaction in elderly with hMPV  Continue home losartan and metoprolol   Adding Amlodipine  BP goal to be less than 180  PRN hydralazine for BP over 180.   3. Treatment for viral illness per PCS  4. Tele   5. Discharge planning pending BP response.     History:  Past Medical History[1]  Past Surgical History[2]  Family History[3]   reports that she has never smoked. She has never used smokeless tobacco. She reports current alcohol use. She reports that she does not use drugs.    Allergies:  Allergies[4]    Medications:  Current Hospital Medications[5]    Review of Systems:  All systems were reviewed and are negative except as described above in HPI.    Physical Exam:  Blood pressure (!) 219/71, pulse 66, temperature 97.4 °F (36.3 °C), temperature source Oral, resp. rate 26, height 5' 6\" (1.676 m), weight 147 lb (66.7 kg), SpO2 94%, not currently breastfeeding.  Temp (24hrs), Av.8 °F (36.6 °C), Min:97.4 °F (36.3 °C), Max:98.1 °F (36.7 °C)    Wt Readings from Last 3 Encounters:   25 147 lb (66.7 kg)   24 148 lb (67.1 kg)   24 155 lb (70.3 kg)       Telemetry: NSR  General: Alert and oriented in no apparent distress.  HEENT: No focal deficits.  Neck: No JVD, carotids 2+ no bruits.  Cardiac: Regular rate and rhythm, S1, S2 normal, no murmur, rub or gallop.  Lungs: Clear  without wheezes, rales, rhonchi or dullness.  Normal excursions and effort.  Abdomen: Soft, non-tender.   Extremities: Without clubbing, cyanosis or edema.  Peripheral pulses are 2+.  Neurologic: Alert and oriented, normal affect.  Skin: Warm and dry.     Laboratories and Data:  Diagnostics:  EKG: Normal sinus rhythm     Echo: pending    CXR: FINDINGS:  Lung volumes are satisfactory.  No new consolidation or pleural effusion.  Heart and pulmonary vessels appear stable, with mild cardiomegaly.  Tortuosity and ectasia of the thoracic aorta is similar to the prior.  Smooth mediastinal contours. Prior 4 level vertebral augmentation.     Labs:   Lab Results   Component Value Date    WBC 3.5 05/01/2025    RBC 3.32 05/01/2025    HGB 11.2 05/01/2025    HCT 33.1 05/01/2025    MCV 99.7 05/01/2025    MCH 33.7 05/01/2025    MCHC 33.8 05/01/2025    RDW 11.2 05/01/2025    .0 05/01/2025     Lab Results   Component Value Date    PT 14.2 04/21/2014    INR 1.0 02/01/2024    INR 1.06 12/26/2023    INR 1.05 12/07/2017         Marli Bella, APRN  5/1/2025  9:06 AM  Pt seen and examined independently.  Agree with above.    Feels better and wants to go home.  BP has improved with additional Rx.  No chest pain.  No shortness of breath. No palpitations.    CV:  RRR  Resp:  CTA B  Abd:  Soft.  ND, NT.  +BS, NA  Ext:  No edema    Plan:  BB  Norvasc  Monitor hemodynamics on current Rx  Steroids per primary Svc        Juno Perales MD         [1]   Past Medical History:   Ankylosing spondylitis (HCC)    BACK SHOULDERS ARMS- receives an infusion  every 2 mos    Aortic atherosclerosis    Chart review.  Noted on CT 3/1/18.    Arrhythmia    PALPATATIONS    Arteritis, unspecified    Left     Back problem    Blind    Calculus of kidney    Kidney stones 30 years ago    Cataract    Bilateral    Cholelithiasis    Collagenous colitis    Diffuse cystic mastopathy    Disorder of bone and cartilage, unspecified    osteopenia    Elevated blood  pressure reading without diagnosis of hypertension    Esophageal reflux    Glaucoma    left eye    High blood pressure    High cholesterol    Hx of motion sickness    Indeterminate PPD    Father had TB, seen by Dr. 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 Rodarte, mild erythema of the rectosigmoid, possibly a variant of normal, bx show collagenous colitis, ileum normal    Egd N/A 08/09/2021    Procedure: ESOPHAGOGASTRODUODENOSCOPY WITH DILATION with bxs, COLONOSCOPY with bxs;  Surgeon: Yaya Cook MD;  Location: [facility], [facility]    Hemorrhoidectomy,int/ext,simple      Needle biopsy left      pt cannot remember    Needle biopsy right      pt cannot remember    Other ambl surg  2005    R leg tendon surgery with post-op wound infection    Other surgical history      right ankle surgery    Other surgical history  05/07/2013     left 1st metatarsophalangeal fusion. Weil osteotomy 2nd metatarsal joint and metatarsophalgeal capsulotomy and extensor digitorum longus lengthening ans 2nd hammer toe. 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, Performed by RACHEL GLEASON at [facility]    Upper gi endoscopy,biopsy  02/2010    3 gastric ulcers from diclofenac, bx for H. pylori was negative    Upper gi endoscopy,diagnosis  1990    wnl   [3]   Family History  Problem Relation Age of Onset    Other (Other) Father         AAA, TB    Cancer Mother         uterus    Musculo-skelatal Disorder Brother         ankolosing spondylitis    Heart Disorder Brother         valve disorder/pacemaker    Musculo-skelatal Disorder Brother         ankylosing spondylitis    Other (viral headache) Brother    [4]   Allergies  Allergen Reactions    Adhesive Tape HIVES and OTHER (SEE COMMENTS)     ZIOpatch adhesive rash    Latex UNKNOWN     Added based on information entered during case entry, please review and add reactions, type, and severity as needed   [5]   Current Facility-Administered Medications:     magnesium oxide (Mag-Ox) tab 400 mg, 400 mg, Oral, Once    acetaminophen (Tylenol Extra Strength) tab 1,000 mg, 1,000 mg, Oral, Q6H PRN    albuterol (Ventolin HFA) 108 (90 Base) MCG/ACT inhaler 2 puff, 2 puff, Inhalation, Q4H PRN    calcium carbonate-vitamin D (Oyster Shell-D) 250-3.125 MG-MCG per tab 2 tablet, 2 tablet, Oral,  BID    folic acid (Folvite) tab 1 mg, 1 mg, Oral, Daily    losartan (Cozaar) tab 50 mg, 50 mg, Oral, BID    [START ON 5/5/2025] methotrexate (Rheumatrex) tab 40 mg, 40 mg, Oral, Q7 Days    metoprolol succinate ER (Toprol XL) 24 hr tab 25 mg, 25 mg, Oral, BID    pantoprazole (Protonix) DR tab 40 mg, 40 mg, Oral, QAM AC    atorvastatin (Lipitor) tab 10 mg, 10 mg, Oral, Nightly    sulfaSALAzine (Azulfidine) tab 1,000 mg, 1,000 mg, Oral, BID    heparin (Porcine) 5000 UNIT/ML injection 5,000 Units, 5,000 Units, Subcutaneous, Q8H SHANA    acetaminophen (Tylenol Extra Strength) tab 500 mg, 500 mg, Oral, Q4H PRN    ondansetron (Zofran) 4 MG/2ML injection 4 mg, 4 mg, Intravenous, Q6H PRN    metoclopramide (Reglan) 5 mg/mL injection 10 mg, 10 mg, Intravenous, Q8H PRN    hydrALAzine (Apresoline) 20 mg/mL injection 10 mg, 10 mg, Intravenous, Q6H PRN    ipratropium-albuterol (Duoneb) 0.5-2.5 (3) MG/3ML inhalation solution 3 mL, 3 mL, Nebulization, Q6H PRN    predniSONE (Deltasone) tab 40 mg, 40 mg, Oral, Daily with breakfast

## 2025-05-01 NOTE — PHYSICAL THERAPY NOTE
Order received, chart reviewed. Pt with -200s. Will hold and follow as appropriate.     Inez Garcia, PT, DPT  05/01/25

## 2025-05-01 NOTE — PLAN OF CARE
Patient alert and oriented x4. BP elevated, AM meds given, on RA. Cough present but pt reports overall improving. Tolerating PO intake, denies N/V. Up x1 person SBA. Voiding without difficulty. Refusing tele due to irritation in the past from the stickers. Paper offered, and refused      Plan: 2D echo completed. BP meds given per orders. Await further plan.

## 2025-05-01 NOTE — PLAN OF CARE
Pt arrived on unit just after 9 pm from ED accompanied by . Pt was admitted for wheezing, cough, elevated BP & HTN. Pt is A&O x 4. Pt's has wheeze and cough upon arrival. Pts diet is Cardiac, 2D Echo ordered as well as Cardiology consult. Pt refused telemetry monitoring d/t blisters and pain from stickers from last time pt was in hospital. Pt is continent of bladder and bowel. Pt ambulates with standby assist and personal walker. Pt has intact skin, one IV site currently SL. Respiratory panel ordered close to midnight came back (+) Metapneumovirus, pt now on droplet precautions- hospitalist notified Pt is good historian. Admission navigator completed, med reconciliation reviewed. POC discussed with pt and  who both verbalized understanding. NOC safety plan in place, bed in low position, bed alarm on, call light and personal items within reach, pt encouraged to call for assistance.

## 2025-05-01 NOTE — PAYOR COMM NOTE
--------------  ADMISSION REVIEW     Payor: [employer]  Subscriber #:  [id number]  Authorization Number: [id number]    Admit date: 4/30/25  Admit time:  9:09 PM       REVIEW DOCUMENTATION:     ED Provider Notes        ED Provider Notes signed by Orestes Adair MD at 4/30/2025 11:32 PM       Author: Orestes Adair MD Service: Emergency Medicine Author Type: Physician    Filed: 4/30/2025 11:32 PM Date of Service: 4/30/2025  2:25 PM Status: Signed    : Orestes Adair MD (Physician)           Patient Seen in: [facility] Emergency Department      History     Chief Complaint   Patient presents with    Hypertension    Cough/URI     Stated Complaint: HTN    Subjective:   HPI    This is a 84-year-old woman here for evaluation of 1 week persistent cough.  States multiple other residents at her facility with cough and cold symptoms recently.  Patient Nuys any fevers any chest pain.  States her coughing episodes will become so frequent and bothersome that she is unable to eat or drink normally.  She denies any leg swelling any difficulty sleeping, fevers vomiting diarrhea abdominal pain any other complaints or concerns.  History of Present Illness               Objective:     Past Medical History:    Ankylosing spondylitis (HCC)    BACK SHOULDERS ARMS- receives an infusion  every 2 mos    Aortic atherosclerosis    Chart review.  Noted on CT 3/1/18.    Arrhythmia    PALPATATIONS    Arteritis, unspecified    Left     Back problem    Blind    Calculus of kidney    Kidney stones 30 years ago    Cataract    Bilateral    Cholelithiasis    Collagenous colitis    Diffuse cystic mastopathy    Disorder of bone and cartilage, unspecified    osteopenia    Elevated blood pressure reading without diagnosis of hypertension    Esophageal reflux    Glaucoma    left eye    High blood pressure    High cholesterol    Hx of motion sickness    Indeterminate PPD    Father had TB, seen by Dr. 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   General:  Patient laying supine in the bed in no acute distress  Head: Normocephalic and atraumatic.   HEENT:  Mucous membranes are moist.   Cardiovascular:  Normal rate and regular rhythm.  No Edema  Pulmonary:  Pulmonary effort is normal.  Faint expiratory wheezing bilateral lung fields, good air entry bilaterally.  No rales  Abdominal: Soft nontender nondistended, normal bowel sounds, no guarding no rebound tenderness  Skin: Warm and dry  Neurological: Awake alert, speech is normal          ED Course     Labs Reviewed   CBC WITH DIFFERENTIAL WITH PLATELET - Abnormal; Notable for the following components:       Result Value    RBC 3.61 (*)     .0 (*)     .6 (*)     MCH 34.1 (*)     All other components within normal limits   COMP METABOLIC PANEL (14) - Abnormal; Notable for the following components:    BUN 24 (*)     Calculated Osmolality 300 (*)     Alkaline Phosphatase 51 (*)     All other components within normal limits   PRO BETA  NATRIURETIC PEPTIDE - Abnormal; Notable for the following components:    Pro-Beta Natriuretic Peptide 1,007 (*)     All other components within normal limits   TROPONIN I HIGH SENSITIVITY - Normal   SARS-COV-2/FLU A AND B/RSV BY PCR (GENEXPERT) - Normal    Narrative:     This test is intended for the qualitative detection and differentiation of SARS-CoV-2, influenza A, influenza B, and respiratory syncytial virus (RSV) viral RNA in nasopharyngeal or nares swabs from individuals suspected of respiratory viral infection consistent with COVID-19 by their healthcare provider. Signs and symptoms of respiratory viral infection due to SARS-CoV-2, influenza, and RSV can be similar.    Test performed using the Xpert Xpress SARS-CoV-2/FLU/RSV (real time RT-PCR)  assay on the [employer] instrument, [place], [place] [address].   This test is being used under the Food and Drug Administration's Emergency Use Authorization.    The authorized Fact Sheet for Healthcare Providers for this assay is available upon request from the laboratory.   BASIC METABOLIC PANEL (8)   MAGNESIUM   CBC WITH DIFFERENTIAL WITH PLATELET   RAINBOW DRAW LAVENDER   RAINBOW DRAW LIGHT GREEN   RAINBOW DRAW BLUE   RESPIRATORY FLU EXPAND PANEL + COVID-19     EKG    Rate, intervals and axes as noted on EKG Report.  Rate: 66  Rhythm: Sinus Rhythm  Reading: No acute ischemic changes        XR CHEST PA + LAT CHEST (YVU=25537)  Result Date: 4/30/2025  PROCEDURE:  XR CHEST PA + LAT CHEST (CPT=71046)  INDICATIONS:  HTN  COMPARISON:  [facility] , XR, XR CHEST AP PORTABLE  (CPT=71045), 1/20/2024, 1:02 PM.  TECHNIQUE:  PA and lateral chest radiographs were obtained.  PATIENT STATED HISTORY: (As transcribed by Technologist)  The patient offered no additional history at this point.    FINDINGS:  Lung volumes are satisfactory.  No new consolidation or pleural effusion.  Heart and pulmonary vessels appear stable, with mild cardiomegaly.  Tortuosity and ectasia of the thoracic  aorta is similar to the prior.  Smooth mediastinal contours.  Prior 4 level vertebral augmentation.              CONCLUSION:  No evidence of active cardiopulmonary disease.   LOCATION:  [facility]   Dictated by (CST): Parish Snyder MD on 4/30/2025 at 2:16 PM     Finalized by (CST): Parish Snyder MD on 4/30/2025 at 2:17 PM          MDM      This is a 84-year-old woman here for evaluation of cough shortness of breath.  Differential includes pneumonia, viral syndrome, has wheezing on exam O2 saturation is 97% on room air blood pressure is elevated, did improve without intervention.  COVID influenza RSV are all negative chest x-ray without focal infiltrate, patient did not appear volume overloaded her troponin is negative basic labs otherwise unremarkable no significant change from previous.  The patient likely has a viral bronchitis, she was given steroids and albuterol nebulizer treatment with significant improvement in her symptoms and is feeling much betterBut still with significant wheezing on exam.  Blood pressure elevated.  No oxygen requirement.  Given persistent wheezing, patient somewhat uncomfortable going home with her blood pressure elevated as it has been as well as first-time wheezing, she will be placed in observation for periodic nebulizer treatments.  Case endorsed to the [facility] hospitalist who agrees with plan for admission.      I independently viewed and interpreted the following imaging: Chest x-ray without acute infiltrate      Reviewed 1/24 internal medicine discharge summary  83 year old female who presents for evaluation of uncontrolled chronic back pain     Problem List / Diagnoses     Acute on Chronic back pain  Acute Closed T8 Compression Fracture, initial visit  Acute closed T9 compression fracture, subsequent visit  CAD  HTN  HL     Plan     Acute on Chronic back pain  Acute Closed T8 Compression Fracture, initial visit  Acute closed T9 compression fracture, subsequent visit  -No recent traumas or  other inciting events that would suggest new injury  - Exam without evidence of neurologic impairment  - MRI shows new T8 endplate fracture  -- kypho to be done 1/15 delayed for 10 days due to COVID infection.  TLSO when up.  -s/p Toradol 50 mg every 6 hours as needed  - changed norco to dilaudid 2 mg po Q4 PRN for pain given that she is on paxovid (interacts with norco and percocet)  -PT/OT -> VERÓNICA  - in discussion with IR about rescheduling kypho for 1/25 or 1/26 as outpt.  SW to be notified of the time, pt to go from [facility] to get the procedure.   1/25/24  at 2 pm - SW notified.    - given reports of worsening back pain with cough, d/w IR, check XR of T and L spine to eval for new compression, neg for new fx.      COVID-19 infection   - no hypoxia or PNA on CXR  - started on paxlovid given risk for severe disease, course completed  - schedule tessalon, cont PRN robitussin      Proteus mirabilis UTI  - pt c/o urinary symptoms   - s/p empiric CTX x 4 days  - final C+S done  - discharge to [facility] on 1 more day of duricef     CAD  HTN  HL  -Stable, resume home meds  - amlodipine dose reduced while pt is on paxlovid, resume regular dose on discharge   - statin on hold as pt is on paxlovid, resume on discharge   - added PRN hydralazine and PRN labetalol for elevated BPs     Stable for discharge to [facility].      Consults: IP CONSULT TO HOSPITALIST  NURSING CONSULT TO DIETITIAN  IP CONSULT TO SOCIAL WORK  IP CONSULT TO PHARMACY     Operative Procedures:  None        Admission disposition: 4/30/2025  5:33 PM      Disposition and Plan     Clinical Impression:  1. Viral syndrome    2. Reactive airway disease without complication, unspecified asthma severity, unspecified whether persistent (HCC)    3. Elevated blood pressure reading    4. Elevated brain natriuretic peptide (BNP) level         Disposition:  Admit  4/30/2025  5:33 pm    Hospital Problems       Present on Admission  Date Reviewed: 4/8/2022          ICD-10-CM Noted POA     * (Principal) Viral syndrome B34.9 4/30/2025 Unknown    Elevated blood pressure reading R03.0 4/30/2025 Unknown    Elevated brain natriuretic peptide (BNP) level R79.89 4/30/2025 Unknown    Reactive airway disease without complication, unspecified asthma severity, unspecified whether persistent (HCC) J45.909 4/30/2025 Unknown             4/30 H&P      Chief Complaint: Dry cough and elevated BP     History of Present Illness: Emma Trevizo is a 84 year old female with history of ankylosing spondylitis, kidney stones, cholelithiasis, hypertension, GERD, hyperlipidemia, rheumatoid arthritis presenting with dry cough and elevated blood pressure.  Patient says that her last few days she has had a persistent cough.  The symptoms and more difficulty with.  Today she also checked her blood pressure which was significantly elevated.  As result of both the symptoms she came to the emergency room for further evaluation and treatment.  Patient denies any fevers or chills.  Patient denies any chest pain or palpitations.  Patient denies any other significant positive review of systems.    84 year old female with history of ankylosing spondylitis, kidney stones, cholelithiasis, hypertension, GERD, hyperlipidemia, rheumatoid arthritis presenting with dry cough and elevated blood pressure.      Bronchitis  -pt with wheezing  -likely viral  -check expanded resp panel  -prednisone po  -duoneb  -supp o2 as needed     Uncontrolled HTN  -etiology uncertain  -continue home meds  -hydralazine iv prn  -cardiology consulted     Elevated BNP  -etiology uncertain  -no obvious sings of acute chf  -cardiology consulted  -check echo     Ankylosing spondylitis  RA  -methotrexate  -sulfasalazine     GERD  -pantoprazole            5/1 Cardiology      CAD  HFpEF  Dry weight 155.  Torsemide 10 mg daily as needed      Plan:   Echo pending  HTN: Common reaction in elderly with hMPV  Continue home losartan and metoprolol   Adding Amlodipine  BP goal to  be less than 180  PRN hydralazine for BP over 180.   3. Treatment for viral illness per PCS  4. Tele         Component Value Date     WBC 3.5 05/01/2025     RBC 3.32 05/01/2025     HGB 11.2 05/01/2025     HCT 33.1 05/01/2025     MCV 99.7 05/01/2025     MCH 33.7 05/01/2025     MCHC 33.8 05/01/2025     RDW 11.2 05/01/2025     .0 05/01/2025           MEDICATIONS ADMINISTERED IN LAST 1 DAY:  acetaminophen (Tylenol Extra Strength) tab 1,000 mg       Date Action Dose Route User    4/30/2025 2250 Given 1,000 mg Oral Missy Robledo RN          albuterol (Ventolin) (5 MG/ML) 0.5% nebulizer solution 10 mg       Date Action Dose Route User    4/30/2025 1534 Given 10 mg Nebulization Ana Jerome, BUCK          amLODIPine (Norvasc) tab 10 mg       Date Action Dose Route User    5/1/2025 0956 Given 10 mg Oral Norma Chan RN          atorvastatin (Lipitor) tab 10 mg       Date Action Dose Route User    4/30/2025 2250 Given 10 mg Oral Missy Robledo RN          calcium carbonate-vitamin D (Oyster Shell-D) 250-3.125 MG-MCG per tab 2 tablet       Date Action Dose Route User    5/1/2025 0756 Given 2 tablet Oral Norma Chan RN          folic acid (Folvite) tab 1 mg       Date Action Dose Route User    5/1/2025 1226 Given 1 mg Oral Norma Chan RN          heparin (Porcine) 5000 UNIT/ML injection 5,000 Units       Date Action Dose Route User    5/1/2025 0525 Given 5,000 Units Subcutaneous (Left Lower Abdomen) Missy Robledo RN    4/30/2025 2250 Given 5,000 Units Subcutaneous (Left Lower Abdomen) Missy Robledo RN          hydrALAzine (Apresoline) 20 mg/mL injection 10 mg       Date Action Dose Route User    5/1/2025 1124 Given 10 mg Intravenous Norma Chan RN          ipratropium (Atrovent) 0.02 % nebulizer solution 0.5 mg       Date Action Dose Route User    4/30/2025 1534 Given 0.5 mg Nebulization Ana Jerome RCP          losartan (Cozaar) tab 50 mg       Date Action Dose Route User    5/1/2025 0753  Given 50 mg Oral Norma Chan RN    4/30/2025 2250 Given 50 mg Oral Missy Robledo RN          magnesium oxide (Mag-Ox) tab 400 mg       Date Action Dose Route User    5/1/2025 0956 Given 400 mg Oral Norma Chan RN          methylPREDNISolone sodium succinate (Solu-MEDROL) injection 125 mg       Date Action Dose Route User    4/30/2025 1523 Given 125 mg Intravenous Qiana Bernard RN          metoprolol succinate ER (Toprol XL) 24 hr tab 25 mg       Date Action Dose Route User    5/1/2025 0756 Given 25 mg Oral Norma Chan RN    4/30/2025 2249 Given 25 mg Oral Missy Robledo RN          metoprolol succinate ER (Toprol XL) 24 hr tab 25 mg       Date Action Dose Route User    5/1/2025 1124 Given 25 mg Oral Norma Chan RN          pantoprazole (Protonix) DR tab 40 mg       Date Action Dose Route User    5/1/2025 0608 Given 40 mg Oral Missy Robledo RN          predniSONE (Deltasone) tab 40 mg       Date Action Dose Route User    5/1/2025 0756 Given 40 mg Oral Norma Chan RN          sulfaSALAzine (Azulfidine) tab 1,000 mg       Date Action Dose Route User    5/1/2025 0756 Given 1,000 mg Oral Norma Chan RN    4/30/2025 2249 Given 1,000 mg Oral Missy Robledo RN            Vitals (last day)       Date/Time Temp Pulse Resp BP SpO2 Weight O2 Device O2 Flow Rate (L/min) [facility]    05/01/25 1200 98.2 °F (36.8 °C) 83 20 152/45 93 % -- None (Room air) --     05/01/25 1154 -- 82 -- 159/47 92 % -- -- --     05/01/25 1122 -- 68 -- 199/72 92 % -- -- --     05/01/25 0948 -- 62 -- 184/55 92 % -- None (Room air) --     05/01/25 0811 97.4 °F (36.3 °C) 66 26 219/71 94 % -- None (Room air) --     05/01/25 0540 -- 79 -- -- 92 % -- -- -- AF 05/01/25 0431 97.7 °F (36.5 °C) 62 18 197/66 90 % -- None (Room air) 0 L/min AF 05/01/25 0330 -- 70 -- 197/66 91 % -- -- -- AF 05/01/25 0326 -- -- -- -- -- 147 lb (66.7 kg) -- --     05/01/25 0000 98.1 °F (36.7 °C) 63 18 155/69 88 % -- None (Room air) 0 L/min  AF    04/30/25 2114 97.9 °F (36.6 °C) 71 18 167/65 95 % -- None (Room air) 0 L/min AF    04/30/25 2030 -- 70 21 181/67 94 % -- -- -- ET    04/30/25 2000 -- 74 33 166/58 96 % -- None (Room air) -- ET    04/30/25 1930 -- 75 23 187/59 96 % -- None (Room air) -- ET    04/30/25 1900 -- 73 17 194/84 96 % -- -- -- ET    04/30/25 1830 -- 67 25 188/67 94 % -- -- -- ET    04/30/25 1815 -- 72 32 -- 94 % -- -- -- ET    04/30/25 1800 -- 70 30 185/69 96 % -- -- -- ET    04/30/25 1730 -- 72 30 187/65 93 % -- -- -- ET    04/30/25 1715 -- 72 16 201/67 95 % -- -- -- ET    04/30/25 1700 -- 68 26 195/65 94 % -- -- -- ET    04/30/25 1630 -- 66 24 206/63 100 % -- None (Room air) -- ET    04/30/25 1615 -- 66 23 222/67 100 % -- -- -- ET    04/30/25 1603 -- 65 31 221/85 100 % -- -- -- ET    04/30/25 1600 -- 60 24 194/55 100 % -- -- -- ET    04/30/25 1500 -- 62 26 190/63 96 % -- None (Room air) -- ET    04/30/25 1430 -- 70 29 170/119 97 % -- -- -- ET    04/30/25 1421 -- 63 26 229/71 98 % -- -- -- ET    04/30/25 1415 -- 67 24 239/74 98 % -- -- -- ET    04/30/25 1301 98.1 °F (36.7 °C) 69 26 218/71 97 % -- None (Room air) -- ET    04/30/25 1259 -- -- -- -- -- 147 lb (66.7 kg) -- -- ET

## 2025-05-01 NOTE — PROGRESS NOTES
[facility] Hospitalist Progress Note                                                                     [facility]   part of [facility]      Emma Trevizo  8/20/1940    SUBJECTIVE: no chest pain, palpitations, shortness of breath, nausea, vomiting, abdominal pain. Pt still has a cough    OBJECTIVE:  Temp:  [97.9 °F (36.6 °C)-98.1 °F (36.7 °C)] 98.1 °F (36.7 °C)  Pulse:  [60-75] 63  Resp:  [16-33] 18  BP: (155-239)/() 155/69  SpO2:  [88 %-100 %] 88 %  Exam  Gen: No acute distress, alert and oriented  Pulm: Lungs clear bilaterally, normal respiratory effort, no crackles, no wheezing  CV: Heart with regular rate and rhythm, no murmur.   Abd: Abdomen soft, nontender, nondistended, bowel sounds present  MSK: No significant pitting edema or tenderness of the LE  Skin: no new rashes or lesions    Labs:   Recent Labs   Lab 04/30/25  1326 05/01/25  0518   WBC 4.9 3.5*   HGB 12.3 11.2*   .6* 99.7   .0* 153.0       Recent Labs   Lab 04/30/25  1328 05/01/25  0518    141   K 4.1 3.9    108   CO2 28.0 26.0   BUN 24* 22   CREATSERUM 0.62 0.51*   CA 9.9 9.0   MG  --  1.7   GLU 90 112*       Recent Labs   Lab 04/30/25  1328   ALT 12   AST 19   ALB 4.7       No results for input(s): \"PGLU\" in the last 168 hours.    Meds:   Scheduled: Scheduled Medications[1]  Continuous Infusions: Medication Infusions[2]  PRN: PRN Medications[3]    ASSESSMENT / PLAN:    84 year old female with history of ankylosing spondylitis, kidney stones, cholelithiasis, hypertension, GERD, hyperlipidemia, rheumatoid arthritis presenting with dry cough and elevated blood pressure.      Bronchitis  -pt with wheezing  -likely viral  -check expanded resp panel--> metapneumovirus   -prednisone po  -duoneb  -supp o2 as needed     Uncontrolled HTN  -etiology uncertain  -continue home meds: losartan and metoprolol   -hydralazine iv prn  -cardiology consulted     Elevated BNP  -etiology  uncertain  -no obvious signs of acute chf  -cardiology consulted  -check echo-- pending     Ankylosing spondylitis  RA  -methotrexate  -sulfasalazine     GERD  -pantoprazole     Quality:  DVT Prophylaxis: scd, heparin sq  CODE status: full per chart  Joseph: none     Plan of care discussed with patient and staff     Dispo: no discharge     Lucio Hoyt MD  [facility] Hospitalist  [phone]           [1]    calcium carbonate-vitamin D  2 tablet Oral BID    folic acid  1 mg Oral Daily    losartan  50 mg Oral BID    [START ON 5/5/2025] Methotrexate Sodium  40 mg Oral Q7 Days    metoprolol succinate ER  25 mg Oral BID    pantoprazole  40 mg Oral QAM AC    atorvastatin  10 mg Oral Nightly    sulfaSALAzine  1,000 mg Oral BID    heparin  5,000 Units Subcutaneous Q8H SHANA    predniSONE  40 mg Oral Daily with breakfast   [2] [3]   acetaminophen    albuterol    acetaminophen    ondansetron    metoclopramide    hydrALAzine    ipratropium-albuterol

## 2025-05-01 NOTE — OCCUPATIONAL THERAPY NOTE
OT orders received, chart reviewed. Patient with BP in 180s-200s systolic. Will hold at this time and reattempt as able and as patient appropriate.

## 2025-05-02 VITALS
HEART RATE: 67 BPM | OXYGEN SATURATION: 90 % | HEIGHT: 66 IN | WEIGHT: 147 LBS | DIASTOLIC BLOOD PRESSURE: 53 MMHG | RESPIRATION RATE: 18 BRPM | SYSTOLIC BLOOD PRESSURE: 128 MMHG | BODY MASS INDEX: 23.63 KG/M2 | TEMPERATURE: 98 F

## 2025-05-02 LAB
ANION GAP SERPL CALC-SCNC: 6 MMOL/L (ref 0–18)
BASOPHILS # BLD AUTO: 0.01 X10(3) UL (ref 0–0.2)
BASOPHILS NFR BLD AUTO: 0.1 %
BUN BLD-MCNC: 26 MG/DL (ref 9–23)
CALCIUM BLD-MCNC: 9.2 MG/DL (ref 8.7–10.6)
CHLORIDE SERPL-SCNC: 106 MMOL/L (ref 98–112)
CO2 SERPL-SCNC: 28 MMOL/L (ref 21–32)
CREAT BLD-MCNC: 0.57 MG/DL (ref 0.55–1.02)
EGFRCR SERPLBLD CKD-EPI 2021: 88 ML/MIN/1.73M2 (ref 60–?)
EOSINOPHIL # BLD AUTO: 0.02 X10(3) UL (ref 0–0.7)
EOSINOPHIL NFR BLD AUTO: 0.3 %
ERYTHROCYTE [DISTWIDTH] IN BLOOD BY AUTOMATED COUNT: 11.5 %
GLUCOSE BLD-MCNC: 93 MG/DL (ref 70–99)
HCT VFR BLD AUTO: 32.8 % (ref 35–48)
HGB BLD-MCNC: 11.2 G/DL (ref 12–16)
IMM GRANULOCYTES # BLD AUTO: 0.02 X10(3) UL (ref 0–1)
IMM GRANULOCYTES NFR BLD: 0.3 %
LYMPHOCYTES # BLD AUTO: 3.79 X10(3) UL (ref 1–4)
LYMPHOCYTES NFR BLD AUTO: 52.6 %
MAGNESIUM SERPL-MCNC: 1.8 MG/DL (ref 1.6–2.6)
MCH RBC QN AUTO: 34 PG (ref 26–34)
MCHC RBC AUTO-ENTMCNC: 34.1 G/DL (ref 31–37)
MCV RBC AUTO: 99.7 FL (ref 80–100)
MONOCYTES # BLD AUTO: 0.5 X10(3) UL (ref 0.1–1)
MONOCYTES NFR BLD AUTO: 6.9 %
NEUTROPHILS # BLD AUTO: 2.86 X10 (3) UL (ref 1.5–7.7)
NEUTROPHILS # BLD AUTO: 2.86 X10(3) UL (ref 1.5–7.7)
NEUTROPHILS NFR BLD AUTO: 39.8 %
OSMOLALITY SERPL CALC.SUM OF ELEC: 294 MOSM/KG (ref 275–295)
PLATELET # BLD AUTO: 183 10(3)UL (ref 150–450)
PLATELETS.RETICULATED NFR BLD AUTO: 3.8 % (ref 0–7)
POTASSIUM SERPL-SCNC: 3.6 MMOL/L (ref 3.5–5.1)
RBC # BLD AUTO: 3.29 X10(6)UL (ref 3.8–5.3)
SODIUM SERPL-SCNC: 140 MMOL/L (ref 136–145)
WBC # BLD AUTO: 7.2 X10(3) UL (ref 4–11)

## 2025-05-02 PROCEDURE — 83735 ASSAY OF MAGNESIUM: CPT | Performed by: HOSPITALIST

## 2025-05-02 PROCEDURE — 80048 BASIC METABOLIC PNL TOTAL CA: CPT | Performed by: HOSPITALIST

## 2025-05-02 PROCEDURE — 85025 COMPLETE CBC W/AUTO DIFF WBC: CPT | Performed by: HOSPITALIST

## 2025-05-02 PROCEDURE — 97161 PT EVAL LOW COMPLEX 20 MIN: CPT

## 2025-05-02 PROCEDURE — 97165 OT EVAL LOW COMPLEX 30 MIN: CPT

## 2025-05-02 PROCEDURE — 94760 N-INVAS EAR/PLS OXIMETRY 1: CPT

## 2025-05-02 PROCEDURE — 97535 SELF CARE MNGMENT TRAINING: CPT

## 2025-05-02 PROCEDURE — 97116 GAIT TRAINING THERAPY: CPT

## 2025-05-02 RX ORDER — PREDNISONE 20 MG/1
40 TABLET ORAL
Qty: 6 TABLET | Refills: 0 | Status: SHIPPED | OUTPATIENT
Start: 2025-05-03 | End: 2025-05-06

## 2025-05-02 RX ORDER — MAGNESIUM OXIDE 400 MG/1
400 TABLET ORAL ONCE
Status: COMPLETED | OUTPATIENT
Start: 2025-05-02 | End: 2025-05-02

## 2025-05-02 RX ORDER — TEMAZEPAM 7.5 MG/1
7.5 CAPSULE ORAL NIGHTLY PRN
Status: DISCONTINUED | OUTPATIENT
Start: 2025-05-02 | End: 2025-05-02

## 2025-05-02 RX ORDER — POTASSIUM CHLORIDE 1500 MG/1
40 TABLET, EXTENDED RELEASE ORAL EVERY 4 HOURS
Status: COMPLETED | OUTPATIENT
Start: 2025-05-02 | End: 2025-05-02

## 2025-05-02 RX ORDER — AMLODIPINE BESYLATE 10 MG/1
10 TABLET ORAL DAILY
Qty: 30 TABLET | Refills: 0 | Status: SHIPPED | OUTPATIENT
Start: 2025-05-03

## 2025-05-02 RX ORDER — ALBUTEROL SULFATE 90 UG/1
2 INHALANT RESPIRATORY (INHALATION) EVERY 4 HOURS PRN
Status: SHIPPED | COMMUNITY
Start: 2025-05-02

## 2025-05-02 NOTE — PHYSICAL THERAPY NOTE
PHYSICAL THERAPY EVALUATION - INPATIENT     Room Number: 386/386-A  Evaluation Date: 2025  Type of Evaluation: Initial  Physician Order: PT Eval and Treat    Presenting Problem: dizziness, weakness, cough, bronchitis, hypertensive  Co-Morbidities : ankylosing spondylitis, compression fxs, osteoporosis  Reason for Therapy: Mobility Dysfunction and Discharge Planning    PHYSICAL THERAPY ASSESSMENT   Patient is a 84 year old female admitted 2025 for dizziness, weakness, cough, bronchitis, high BP.   Patient is currently functioning at baseline with bed mobility, transfers, and gait. Prior to admission, patient's baseline is ambulatory with rollator.     Patient will benefit from continued skilled PT Services at discharge to promote prior level of function and safety with additional support and return home with home health PT.    PLAN  Patient has been evaluated and presents with no skilled Physical Therapy needs at this time.  Patient discharged from Physical Therapy services.  Please re-order if a new functional limitation presents during this admission.         GOALS  Patient was able to achieve the following goals ...    Patient was able to transfer At previous, functional level   Patient able to ambulate on level surfaces At previous, functional level     HOME SITUATION  Type of Home: Independent living facility  Home Layout:  ([place])                     Lives With: Spouse              Prior Level of [place]: Pt typically indep with ADLs and mobility. Recently started usinga  rollator which works well for her. Denies any falls in the past year.     SUBJECTIVE  \"The seat on the walker is nice because I can put things on it\"    OBJECTIVE  Precautions: Bed/chair alarm  Fall Risk: Standard fall risk    WEIGHT BEARING RESTRICTION     PAIN ASSESSMENT  Ratin          COGNITION  Overall Cognitive Status:  WFL - within functional limits    RANGE OF MOTION AND STRENGTH ASSESSMENT  See OT note for UE  assessment    Lower extremity ROM is within functional limits      Lower extremity strength is within functional limits      BALANCE  Static Sitting: Fair +  Dynamic Sitting: Fair +  Static Standing: Fair -  Dynamic Standing: Fair -    ADDITIONAL TESTS                                    ACTIVITY TOLERANCE                         O2 WALK       NEUROLOGICAL FINDINGS                        AM-PAC '6-Clicks' INPATIENT SHORT FORM - BASIC MOBILITY  How much difficulty does the patient currently have...  Patient Difficulty: Turning over in bed (including adjusting bedclothes, sheets and blankets)?: None   Patient Difficulty: Sitting down on and standing up from a chair with arms (e.g., wheelchair, bedside commode, etc.): None   Patient Difficulty: Moving from lying on back to sitting on the side of the bed?: None   How much help from another person does the patient currently need...   Help from Another: Moving to and from a bed to a chair (including a wheelchair)?: None   Help from Another: Need to walk in hospital room?: None   Help from Another: Climbing 3-5 steps with a railing?: A Little       AM-PAC Score:  Raw Score: 23   Approx Degree of Impairment: 11.2%   Standardized Score (AM-PAC Scale): 56.93   CMS Modifier (G-Code): CI    FUNCTIONAL ABILITY STATUS  Gait Assessment   Functional Mobility/Gait Assessment  Gait Assistance: Supervision, Modified independent  Distance (ft): 200  Assistive Device:  (rollator)  Pattern: Within Functional Limits    Skilled Therapy Provided     Bed Mobility:  Rolling: NT  Supine to sit: ind   Sit to supine: NT     Transfer Mobility:  Sit to stand: supervision   Stand to sit: supervision  Gait = supervision    Therapist's comments:RN cleared for session. Pt agreeable for therapy, received supine. Pt educated on benefit of walker seat for rest breaks as needed for energy conservation. Encouraged continued usage of walker for optimal balance and energy conservation. Instructed to call for  nursing staff for any needs and OOB mobility.     Exercise/Education Provided:  Bed mobility  Body mechanics  Energy conservation  Functional activity tolerated  Gait training  Posture  Strengthening  Transfer training    Patient End of Session: Up in chair, Needs met, Call light within reach, RN aware of session/findings, All patient questions and concerns addressed, Hospital anti-slip socks, Alarm set, Family present, Discussed recommendations with /    Patient Evaluation Complexity Level:  History High - 3 or more personal factors and/or co-morbidities   Examination of body systems Low -  addressing 1-2 elements   Clinical Presentation Low- Stable   Clinical Decision Making Low Complexity       PT Session Time: 20 minutes  Gait Training: 10 minutes

## 2025-05-02 NOTE — PLAN OF CARE
Pt A&O 4. 2 L O2 while asleep with O2 sat in low 90s. Declined PRN nebs. Call light within reach. Safety precautions and isolation in place.    0620: Electrolytes replaced per protocol. /64 (), scheduled metoprolol given, MD notified. Scheduled losartan given with MD approval.  Problem: DISCHARGE PLANNING  Goal: Discharge to home or other facility with appropriate resources  Description: INTERVENTIONS:- Identify barriers to discharge w/pt and caregiver- Include patient/family/discharge partner in discharge planning- Arrange for needed discharge resources and transportation as appropriate- Identify discharge learning needs (meds, wound care, etc)- Arrange for interpreters to assist at discharge as needed- Consider post-discharge preferences of patient/family/discharge partner- Complete POLST form as appropriate- Assess patient's ability to be responsible for managing their own health- Refer to Case Management Department for coordinating discharge planning if the patient needs post-hospital services based on physician/LIP order or complex needs related to functional status, cognitive ability or social support system  Outcome: Progressing     Problem: CARDIOVASCULAR - ADULT  Goal: Absence of cardiac arrhythmias or at baseline  Description: INTERVENTIONS:- Continuous cardiac monitoring, monitor vital signs, obtain 12 lead EKG if indicated- Evaluate effectiveness of antiarrhythmic and heart rate control medications as ordered- Initiate emergency measures for life threatening arrhythmias- Monitor electrolytes and administer replacement therapy as ordered  Outcome: Progressing     Problem: RESPIRATORY - ADULT  Goal: Achieves optimal ventilation and oxygenation  Description: INTERVENTIONS:- Assess for changes in respiratory status- Assess for changes in mentation and behavior- Position to facilitate oxygenation and minimize respiratory effort- Oxygen supplementation based on oxygen saturation or ABGs- Provide  Smoking Cessation handout, if applicable- Encourage broncho-pulmonary hygiene including cough, deep breathe, Incentive Spirometry- Assess the need for suctioning and perform as needed- Assess and instruct to report SOB or any respiratory difficulty- Respiratory Therapy support as indicated- Manage/alleviate anxiety- Monitor for signs/symptoms of CO2 retention  Outcome: Progressing     Problem: METABOLIC/FLUID AND ELECTROLYTES - ADULT  Goal: Electrolytes maintained within normal limits  Description: INTERVENTIONS:- Monitor labs and rhythm and assess patient for signs and symptoms of electrolyte imbalances- Administer electrolyte replacement as ordered- Monitor response to electrolyte replacements, including rhythm and repeat lab results as appropriate- Fluid restriction as ordered- Instruct patient on fluid and nutrition restrictions as appropriate  Outcome: Progressing     Problem: SAFETY ADULT - FALL  Goal: Free from fall injury  Description: INTERVENTIONS:- Assess pt frequently for physical needs- Identify cognitive and physical deficits and behaviors that affect risk of falls.- [place] fall precautions as indicated by assessment.- Educate pt/family on patient safety including physical limitations- Instruct pt to call for assistance with activity based on assessment- Modify environment to reduce risk of injury- Provide assistive devices as appropriate- Consider OT/PT consult to assist with strengthening/mobility- Encourage toileting schedule  Outcome: Progressing

## 2025-05-02 NOTE — DISCHARGE INSTRUCTIONS
- Sit up in a chair as much as possible  - Use the incentive spirometer 10 times per hour while awake (space out over the hour, not all at once)    - Schedule a follow up appointment with cardiology and your primary care physician     Sometimes managing your health at home requires assistance.  The [facility] team has recognized your preference to use [facility].  They can be reached by phone at [phone].  The fax number for your reference is [phone].  A representative from the home health agency will contact you or your family to schedule your first visit.

## 2025-05-02 NOTE — CM/SW NOTE
05/02/25 1100   CM/SW Referral Data   Referral Source Social Work (self-referral)   Reason for Referral Discharge planning   Informant EMR;Clinical Staff Member   Patient Info   Patient's Current Mental Status at Time of Assessment Alert;Oriented   Patient's Home Environment Independent Living  ([facility])   Patient lives with Spouse/Significant other   Discharge Needs   Anticipated D/C needs Home health care       Patient is an 83 y/o woman admitted with meta pneumovirus. Informed by therapy that pt would benefit from HH services at [place]. Referral sent to HH agencies via [facility]. Noted pt with history of [facility]. Await responses for further DC planning. / to remain available for support and/or discharge planning.     Kelsey Rea, [facility]  Discharge Planner  [phone]

## 2025-05-02 NOTE — PROGRESS NOTES
[facility]  Progress Note    Emma Trevizo Patient Status:  Inpatient    1940 MRN [id number]   Location [facility] 3SW-A Attending Lucio Hoyt MD   Hosp Day # 2 PCP Cathy Martinez MD     Subjective:   No chest pain.  Some cough.  No shortness of breath.  No focal cardiovascular complaints reported.    Objective:  Vitals:    25 0500 25 0630 25 0808 25 0943   BP: (!) 179/63 (!) 204/64 (!) 168/64 (!) 177/61   BP Location: Left arm Right arm Right arm    Pulse: 65 71 64 68   Resp: 18  20    Temp: 98.4 °F (36.9 °C)  97.3 °F (36.3 °C)    TempSrc: Oral  Oral    SpO2: 93% 91% 90% 95%   Weight:       Height:           Temp (24hrs), Av.1 °F (36.7 °C), Min:97.3 °F (36.3 °C), Max:98.5 °F (36.9 °C)      Medications:   Scheduled:   Scheduled Medications[1]    Continuous Infusion:   Medication Infusions[2]    PRN Medications:   PRN Medications[3]    Intake/Output:     Intake/Output Summary (Last 24 hours) at 2025 1022  Last data filed at 2025 1340  Gross per 24 hour   Intake 360 ml   Output --   Net 360 ml       Wt Readings from Last 3 Encounters:   25 147 lb (66.7 kg)   24 148 lb (67.1 kg)   24 155 lb (70.3 kg)       Allergies:  Allergies[4]    Physical Exam:   General:  Well-developed / Well-nourished.  No acute distress.  HEENT:  Normocephalic.  Atraumatic.  No icterus.  Neck:  There is no jugular venous distention.   Cardiovascular:  Cardiovascular examination demonstrates a regular rate and rhythm.  There is normal S1, S2.  There is no S3 or S4.  There are no murmurs, rubs, or gallops.  No click is appreciated.  PMI is nondisplaced with a normal apical impulse.    Pulmonary:  Lungs are coarse on auscultation bilaterally.  There are no focal rales or wheezes.  Few rhonchi. Good air movement is noted throughout both lung fields.   Abdomen:  The abdomen is soft, non-distended, and non-tender.  Bowel sounds are present and normoactive.  No organomegaly is  appreciated.  Extremities:  Extremities do not demonstrate any evidence of peripheral edema.   No cyanosis or clubbing of the digits is appreciated.  Neurologic:  Alert and oriented.  Normal affect.  Integument:  No visible rashes are appreciated.      Laboratory/Data:   Recent Labs   Lab 04/30/25 1328 05/01/25 0518 05/02/25  0458   GLU 90 112* 93   BUN 24* 22 26*   CREATSERUM 0.62 0.51* 0.57   CA 9.9 9.0 9.2   ALB 4.7  --   --     141 140   K 4.1 3.9 3.6    108 106   CO2 28.0 26.0 28.0   ALKPHO 51*  --   --    AST 19  --   --    ALT 12  --   --    BILT 0.5  --   --    TP 7.1  --   --        Recent Labs   Lab 04/30/25 1326 05/01/25 0518 05/02/25  0458   RBC 3.61* 3.32* 3.29*   HGB 12.3 11.2* 11.2*   HCT 36.3 33.1* 32.8*   .6* 99.7 99.7   MCH 34.1* 33.7 34.0   MCHC 33.9 33.8 34.1   RDW 11.7 11.2 11.5   NEPRELIM 2.58 1.99 2.86   WBC 4.9 3.5* 7.2   .0* 153.0 183.0       No results for input(s): \"PTP\", \"INR\" in the last 168 hours.    No results for input(s): \"TROP\", \"CK\" in the last 168 hours.    Echo:  Conclusions:     1. Left ventricle: The cavity size was normal. Mild assymetric septal      hypertrophy. Systolic function was normal. The estimated ejection      fraction was 60-65%, by visual assessment. No diagnostic evidence for      regional wall motion abnormalities. Diastolic function present.   2. Right ventricle: Systolic function was normal. The RV pressure during      systole is 27mm Hg.   3. Left atrium: The left atrial volume was markedly increased.   4. Aortic valve: There was thickening, consistent with sclerosis.   5. Mitral valve: The annulus was mildly calcified. There was mild      regurgitation.     Assessment:    HTN  -Some lability may reflect anxieties  HLD  Bronchitis  -Metapneumovirus  CAD  HFpEF  -Compensated    Plan:    BB   ARB   Norvasc   Monitor BP - discharge planning pending reasonable BP stability    Juno Perales MD  5/2/2025  10:22 AM         [1]     potassium chloride  40 mEq Oral Q4H    amLODIPine  10 mg Oral Daily    [START ON 5/5/2025] Methotrexate Sodium  17.5 mg Oral Q Monday    metoprolol succinate ER  25 mg Oral Nightly    metoprolol succinate  50 mg Oral Daily Beta Blocker    latanoprost  1 drop Right Eye Nightly    calcium carbonate-vitamin D  2 tablet Oral BID    folic acid  1 mg Oral Daily    losartan  50 mg Oral BID    pantoprazole  40 mg Oral QAM AC    atorvastatin  10 mg Oral Nightly    sulfaSALAzine  1,000 mg Oral BID    heparin  5,000 Units Subcutaneous Q8H SHANA    predniSONE  40 mg Oral Daily with breakfast   [2] [3]   temazepam    acetaminophen    albuterol    acetaminophen    ondansetron    metoclopramide    hydrALAzine    ipratropium-albuterol  [4]   Allergies  Allergen Reactions    Adhesive Tape HIVES and OTHER (SEE COMMENTS)     ZIOpatch adhesive rash    Latex UNKNOWN     Added based on information entered during case entry, please review and add reactions, type, and severity as needed

## 2025-05-02 NOTE — CM/SW NOTE
05/02/25 1426   Choice of Post-Acute Provider   Informed patient of right to choose their preferred provider Yes   List of appropriate post-acute services provided to patient/family with quality data Yes   [facility]family choice [facility]    Information given to Patient;Spouse/Significant other   [facility] financial disclosure given Yes       Met with pt/spouse at bedside to discuss DC planning. List of accepting HH agencies given. Pt concerned that she will have a number of medical appointments coming up, but would be agreeable with a few HH visits. She has used [facility] previously and would like to use this agency today. Plan for discharge today. No other concerns identified. Updated Sylvie with [facility]. / to remain available for support and/or discharge planning.     Kelsey Rea, [facility]  Discharge Planner  [phone]

## 2025-05-02 NOTE — PROGRESS NOTES
Discharge instructions reviewed with patient and spouse at the bedside. Emphasized importance of IS, pt stated understanding. Appointments with cardiology and PCP already scheduled per pt.    History of bunionectomy    History of total hip replacement, left  THR 8/18/2020  S/P bunionectomy  with revision  S/P partial lobectomy of lung  Right lower lobe  Status post lobectomy of lung  3/2017 (Right lung)

## 2025-05-02 NOTE — PAYOR COMM NOTE
5/1  --------------  CONTINUED STAY REVIEW    Payor: [employer]  Subscriber #:  [id number]  Authorization Number: [id number]    Admit date: 4/30/25  Admit time:  9:09 PM    REVIEW DOCUMENTATION:  5/1            Temp:  [97.9 °F (36.6 °C)-98.1 °F (36.7 °C)] 98.1 °F (36.7 °C)  Pulse:  [60-75] 63  Resp:  [16-33] 18  BP: (155-239)/() 155/69  SpO2:  [88 %-100 %] 88 %  Exam  Gen: No acute distress, alert and oriented  Pulm: Lungs clear bilaterally, normal respiratory effort, no crackles, no wheezing  CV: Heart with regular rate and rhythm, no murmur.   Abd: Abdomen soft, nontender, nondistended, bowel sounds present  MSK: No significant pitting edema or tenderness of the LE  Skin: no new rashes or lesions     Labs:        Recent Labs   Lab 04/30/25  1326 05/01/25  0518   WBC 4.9 3.5*   HGB 12.3 11.2*   .6* 99.7   .0* 153.0              Recent Labs   Lab 04/30/25  1328 05/01/25  0518    141   K 4.1 3.9    108   CO2 28.0 26.0   BUN 24* 22   CREATSERUM 0.62 0.51*   CA 9.9 9.0   MG  --  1.7   GLU 90 112*             84 year old female with history of ankylosing spondylitis, kidney stones, cholelithiasis, hypertension, GERD, hyperlipidemia, rheumatoid arthritis presenting with dry cough and elevated blood pressure.      Bronchitis  -pt with wheezing  -likely viral  -check expanded resp panel--> metapneumovirus   -prednisone po  -duoneb  -supp o2 as needed     Uncontrolled HTN  -etiology uncertain  -continue home meds: losartan and metoprolol   -hydralazine iv prn  -cardiology consulted     Elevated BNP  -etiology uncertain  -no obvious signs of acute chf  -cardiology consulted  -check echo-- pending     Ankylosing spondylitis  RA  -methotrexate  -sulfasalazine        5/1 Cardiology  Reason for Consultation:  Hypertension     History of Present Illness:  Emma Trevizo is a a(n) 84 year old female that presented to ED with complaints of cough and shortness of breath. Positive for  metapneumo-virus Chest XR unremarkable. Wheezing noted. BP noted to be above 200 systolic.      Impression:   HTN  HLD  Palpitations  Cough  CAD  HFpEF  Dry weight 155.  Torsemide 10 mg daily as needed      Plan:   Echo pending  HTN: Common reaction in elderly with hMPV  Continue home losartan and metoprolol   Adding Amlodipine  BP goal to be less than 180  PRN hydralazine for BP over 180.   3. Treatment for viral illness per PCS  4. Tele                 MEDICATIONS ADMINISTERED IN LAST 1 DAY:  amLODIPine (Norvasc) tab 10 mg       Date Action Dose Route User    5/2/2025 0754 Given 10 mg Oral Norma Chan RN          atorvastatin (Lipitor) tab 10 mg       Date Action Dose Route User    5/1/2025 2117 Given 10 mg Oral Lucy Schulz RN          calcium carbonate-vitamin D (Oyster Shell-D) 250-3.125 MG-MCG per tab 2 tablet       Date Action Dose Route User    5/1/2025 2117 Given 2 tablet Oral Lucy Schulz RN          folic acid (Folvite) tab 1 mg       Date Action Dose Route User    5/2/2025 1101 Given 1 mg Oral Norma Chan RN          heparin (Porcine) 5000 UNIT/ML injection 5,000 Units       Date Action Dose Route User    5/2/2025 0617 Given 5,000 Units Subcutaneous (Right Lower Abdomen) Lucy Schulz RN    5/1/2025 2120 Given 5,000 Units Subcutaneous (Left Lower Abdomen) Lucy Schulz RN    5/1/2025 1444 Given 5,000 Units Subcutaneous (Right Lower Abdomen) Norma Chan RN          latanoprost (Xalatan) 0.005 % ophthalmic solution 1 drop       Date Action Dose Route User    5/1/2025 2241 Given 1 drop Right Eye Lucy Schulz RN          losartan (Cozaar) tab 50 mg       Date Action Dose Route User    5/2/2025 0717 Given 50 mg Oral Lucy Schulz RN    5/1/2025 2136 Given 50 mg Oral Lucy Schulz RN          magnesium oxide (Mag-Ox) tab 400 mg       Date Action Dose Route User    5/2/2025 0618 Given 400 mg Oral Lucy Schulz RN          metoprolol succinate ER (Toprol XL) 24 hr tab 25 mg       Date Action Dose  Route User    5/1/2025 2136 Given 25 mg Oral Lucy Schulz RN          metoprolol succinate ER (Toprol XL) 24 hr tab 50 mg       Date Action Dose Route User    5/2/2025 0618 Given 50 mg Oral Lucy Schulz RN          pantoprazole (Protonix) DR tab 40 mg       Date Action Dose Route User    5/2/2025 0618 Given 40 mg Oral Lucy Schulz RN          potassium chloride (Klor-Con M20) tab 40 mEq       Date Action Dose Route User    5/2/2025 1101 Given 40 mEq Oral Norma Chan RN    5/2/2025 0618 Given 40 mEq Oral Lucy Schulz RN          predniSONE (Deltasone) tab 40 mg       Date Action Dose Route User    5/2/2025 0755 Given 40 mg Oral Norma Chan RN          sulfaSALAzine (Azulfidine) tab 1,000 mg       Date Action Dose Route User    5/2/2025 0755 Given 1,000 mg Oral Norma Chan RN    5/1/2025 2136 Given 1,000 mg Oral Lucy Schulz RN            Vitals (last day)       Date/Time Temp Pulse Resp BP SpO2 Weight O2 Device O2 Flow Rate (L/min) [facility]    05/02/25 1222 97.6 °F (36.4 °C) 67 18 128/53 90 % -- None (Room air) --     05/02/25 1111 -- 70 -- 157/53 87 % -- -- --     05/02/25 1036 -- -- -- -- 95 % -- -- --     05/02/25 0943 -- 68 -- 177/61 95 % -- None (Room air) --     05/02/25 0808 97.3 °F (36.3 °C) 64 20 168/64 90 % -- Nasal cannula 2 L/min LW    05/02/25 0630 -- 71 -- 204/64 91 % -- -- -- ZA    05/02/25 0500 98.4 °F (36.9 °C) 65 18 179/63 93 % -- Nasal cannula 2 L/min AF    05/02/25 0052 -- -- -- -- 90 % -- Nasal cannula 2 L/min ZA    05/02/25 0000 98.1 °F (36.7 °C) 72 18 159/50 87 % -- -- 0 L/min     05/01/25 2130 -- 77 -- 150/41 90 % -- -- --     05/01/25 2107 -- 75 -- -- 89 % -- -- --     05/01/25 2000 98.5 °F (36.9 °C) 74 18 136/57 90 % -- None (Room air) 0 L/min     05/01/25 1609 98 °F (36.7 °C) 77 20 149/50 92 % -- None (Room air) --     05/01/25 1200 98.2 °F (36.8 °C) 83 20 152/45 93 % -- None (Room air) --     05/01/25 1154 -- 82 -- 159/47 92 % -- -- --     05/01/25 1122  -- 68 -- 199/72 92 % -- -- -- MU    05/01/25 0948 -- 62 -- 184/55 92 % -- None (Room air) -- LW    05/01/25 0811 97.4 °F (36.3 °C) 66 26 219/71 94 % -- None (Room air) -- LW    05/01/25 0540 -- 79 -- -- 92 % -- -- -- AF    05/01/25 0431 97.7 °F (36.5 °C) 62 18 197/66 90 % -- None (Room air) 0 L/min AF    05/01/25 0330 -- 70 -- 197/66 91 % -- -- -- AF    05/01/25 0326 -- -- -- -- -- 147 lb (66.7 kg) -- -- LV    05/01/25 0000 98.1 °F (36.7 °C) 63 18 155/69 88 % -- None (Room air) 0 L/min AF          CIWA Scores (since admission)       None

## 2025-05-02 NOTE — DIETARY NOTE
[facility]   part of [facility]   CLINICAL NUTRITION    Emma Trevizo     Admitting diagnosis:  Viral syndrome [B34.9]  Elevated blood pressure reading [R03.0]  Elevated brain natriuretic peptide (BNP) level [R79.89]  Reactive airway disease without complication, unspecified asthma severity, unspecified whether persistent (HCC) [J45.909]    Ht: 167.6 cm (5' 6\")  Wt: 66.7 kg (147 lb).   Body mass index is 23.73 kg/m².  IBW: 59 kg    Wt Readings from Last 6 Encounters:   05/01/25 66.7 kg (147 lb)   12/06/24 67.1 kg (148 lb)   06/07/24 70.3 kg (155 lb)   02/09/24 69.6 kg (153 lb 6.4 oz)   02/08/24 69.6 kg (153 lb 6.4 oz)   02/06/24 69.9 kg (154 lb)        Labs/Meds reviewed    Diet:       Procedures    Cardiac diet Cardiac; Is Patient on Accuchecks? No     Percent Meals Eaten (last 3 days)       Date/Time Percent Meals Eaten (%)    05/01/25 0910 80 %    05/01/25 1340 100 %    05/02/25 1036 100 %          Pt chart reviewed d/t nutrition consult for at risk and MST score of 2. Pt is discharging today.    Pt reported cough and decreased appetite x 1 week. When feeling well, pt stated she is able to consume 3 meals per day.  + metapneumovirus (4/30). Denied any N/V/D/C. Recorded PO intakes:%. Helped pt order lunch. Appetite improving.  Nursing notes reports Percent Meals Eaten (%): 100 % intake for last meal.  Encouraged pt to keep up PO intakes to maintain wt/strength. Encouraged smaller more frequent meals, if easier.   Pt reported BM today. Skin intact.   No significant weight changes noted per EMR hx. However, pt thinks she may have lost ~4 lb since feeling ill.      PMH:ankylosing spondylitis, RA, HTN, HLD, GERD, cholelithiasis, kidney stones.     Patient is at low nutrition risk at this time.    Please consult if patient status changes or nutrition issues arise.    Marli Ruano MS, RD, LDN  Clinical Dietitian  Ext:[phone]

## 2025-05-02 NOTE — OCCUPATIONAL THERAPY NOTE
OCCUPATIONAL THERAPY EVALUATION - INPATIENT    Room Number: 386/386-A  Evaluation Date: 5/2/2025     Type of Evaluation: Initial  Presenting Problem: HTN, metapneumovirus    Physician Order: IP Consult to Occupational Therapy  Reason for Therapy:  ADL/IADL Dysfunction and Discharge Planning      OCCUPATIONAL THERAPY ASSESSMENT   Patient met all OT goals at supervision-mod (I) level.    Patient reports no further questions/concerns at this time.   Discharge OT in hospital.          History: Patient is a 84 year old female admitted on 4/30/2025 with Presenting Problem: HTN, metapneumovirus. Co-Morbidities : ankylosing spondylitis, compression fxs, osteoporosis    WEIGHT BEARING RESTRICTION                   Recommendations for nursing staff:   Transfers: supervision with rollator  Toileting location: Toilet    EVALUATION SESSION:  ACTIVITY TOLERANCE   SBP 180s, lower than admit  RA >90% O2        EDUCATION PROVIDED  Patient Education : Role of Occupational Therapy; Plan of Care  Patient's Response to Education: Verbalized Understanding    PATIENT START OF SESSION: seated  FUNCTIONAL TRANSFER ASSESSMENT  Sit to Stand: Chair  Chair: Supervision  Toilet Transfer: Supervision (commode over)    BED MOBILITY  Sit to Supine (OT): Supervision    BALANCE ASSESSMENT     FUNCTIONAL ADL ASSESSMENT  Grooming Standing: Supervision  Toileting Seated: Supervision      EQUIPMENT USED: Rollator, commode over toilet  Demonstrates functional use    THERAPIST COMMENTS: ADL/mobility as noted. Declines LB dressing task at this time. No concerns with going home.     Patient End of Session: In bed, Needs met, Call light within reach, RN aware of session/findings, All patient questions and concerns addressed, Hospital anti-slip socks, Alarm set    OCCUPATIONAL PROFILE    HOME SITUATION  Type of Home: Independent living facility  Home Layout:  ([place])  Lives With: Spouse    Toilet and Equipment: Toilet riser with arms  Shower/Tub and  Equipment: Walk-in shower, Shower chair                     Prior Level of Function: Pt reports independence with ADL and ambulation with rollator prior to admit.      SUBJECTIVE  Pt states she wants to wait til she's going home to get dressed.    PAIN ASSESSMENT  Ratin          OBJECTIVE  Precautions: Bed/chair alarm  Fall Risk: Standard fall risk    WEIGHT BEARING RESTRICTION                   AM-PAC ‘6-Clicks’ Inpatient Daily Activity Short Form  -   Putting on and taking off regular lower body clothing?: A Little  -   Bathing (including washing, rinsing, drying)?: A Little  -   Toileting, which includes using toilet, bedpan or urinal? : A Little  -   Putting on and taking off regular upper body clothing?: None  -   Taking care of personal grooming such as brushing teeth?: None  -   Eating meals?: None    AM-PAC Score:  Score: 21  Approx Degree of Impairment: 32.79%  Standardized Score (AM-PAC Scale): 44.27      ADDITIONAL TESTS     NEUROLOGICAL FINDINGS        PLAN   Patient has been evaluated and presents with no skilled Occupational Therapy needs at this time.  Patient discharged from Occupational Therapy services.  Please re-order if a new functional limitation presents during this admission.      Patient Evaluation Complexity Level:   Occupational Profile/Medical History LOW   Specific performance deficits impacting engagement in ADL/IADL LOW   Client Assessment/Performance Deficits LOW   Clinical Decision Making LOW   Overall Complexity LOW     OT Session Time: 25 minutes  Self-Care Home Management: 15 minutes  Therapeutic Activity:  minutes  Neuromuscular Re-education:  minutes  Therapeutic Exercise: minutes

## 2025-05-02 NOTE — HOME CARE LIAISON
Received referral via [facility] for Home Health services. Spoke w/ patient at the bedside and wants to use [facility] again. Demographics remain the same and added contact information to the AVS .

## 2025-05-02 NOTE — PLAN OF CARE
Pt alert and oriented x4. BP remains elevated but SBP <180. Patient weaned to RA this AM with IS encouragement and up in the chair. Ambulated with PT. Pt voiding without difficulty in the bathroom. Tolerating diet, denies any N/V.       Plan: monitor BP, await further decision regarding discharge

## 2025-05-02 NOTE — PROGRESS NOTES
EM Hospitalist Progress Note                                                                     [facility]  8/20/1940    SUBJECTIVE: no chest pain, palpitations, shortness of breath, nausea, vomiting, abdominal pain. Pt still has a cough    OBJECTIVE:  Temp:  [97.4 °F (36.3 °C)-98.5 °F (36.9 °C)] 98.4 °F (36.9 °C)  Pulse:  [62-83] 71  Resp:  [18-26] 18  BP: (136-219)/(41-72) 204/64  SpO2:  [87 %-94 %] 91 %  Exam  Gen: No acute distress, alert and oriented  Pulm: Lungs clear bilaterally, normal respiratory effort, no crackles, no wheezing  CV: Heart with regular rate and rhythm, no murmur.   Abd: Abdomen soft, nontender, nondistended, bowel sounds present  MSK: No significant pitting edema or tenderness of the LE  Skin: no new rashes or lesions    Labs:   Recent Labs   Lab 04/30/25  1326 05/01/25  0518 05/02/25  0458   WBC 4.9 3.5* 7.2   HGB 12.3 11.2* 11.2*   .6* 99.7 99.7   .0* 153.0 183.0       Recent Labs   Lab 04/30/25  1328 05/01/25  0518 05/02/25  0458    141 140   K 4.1 3.9 3.6    108 106   CO2 28.0 26.0 28.0   BUN 24* 22 26*   CREATSERUM 0.62 0.51* 0.57   CA 9.9 9.0 9.2   MG  --  1.7 1.8   GLU 90 112* 93       Recent Labs   Lab 04/30/25  1328   ALT 12   AST 19   ALB 4.7       No results for input(s): \"PGLU\" in the last 168 hours.    Meds:   Scheduled: Scheduled Medications[1]  Continuous Infusions: Medication Infusions[2]  PRN: PRN Medications[3]    ASSESSMENT / PLAN:    84 year old female with history of ankylosing spondylitis, kidney stones, cholelithiasis, hypertension, GERD, hyperlipidemia, rheumatoid arthritis presenting with dry cough and elevated blood pressure.      Bronchitis  -pt with wheezing  -likely viral  -check expanded resp panel--> metapneumovirus   -prednisone po, day # 2/5  -duoneb prn  -supp o2 as needed     Uncontrolled HTN  -etiology uncertain  -continue home  meds: losartan and metoprolol   -hydralazine iv prn  -cardiology following  -amlodipine added     Elevated BNP  -etiology uncertain  -no obvious signs of acute chf  -cardiology following  -check echo-- > no acute pathology      Ankylosing spondylitis  RA  -methotrexate  -sulfasalazine     GERD  -pantoprazole     Quality:  DVT Prophylaxis: scd, heparin sq  CODE status: full per chart  Joseph: none     Plan of care discussed with patient and staff     Dispo: possible discharge     Lucio Hoyt MD  [facility] Hospitalist  [phone]           [1]    potassium chloride  40 mEq Oral Q4H    amLODIPine  10 mg Oral Daily    [START ON 5/5/2025] Methotrexate Sodium  17.5 mg Oral Q Monday    metoprolol succinate ER  25 mg Oral Nightly    metoprolol succinate  50 mg Oral Daily Beta Blocker    latanoprost  1 drop Right Eye Nightly    calcium carbonate-vitamin D  2 tablet Oral BID    folic acid  1 mg Oral Daily    losartan  50 mg Oral BID    pantoprazole  40 mg Oral QAM AC    atorvastatin  10 mg Oral Nightly    sulfaSALAzine  1,000 mg Oral BID    heparin  5,000 Units Subcutaneous Q8H SHANA    predniSONE  40 mg Oral Daily with breakfast   [2] [3]   acetaminophen    albuterol    acetaminophen    ondansetron    metoclopramide    hydrALAzine    ipratropium-albuterol

## (undated) DEVICE — HERCULES 3 STAGE BALLOON ESOPHAGEAL: Brand: HERCULES

## (undated) DEVICE — V2 SPECIMEN COLLECTION MANIFOLD KIT: Brand: NEPTUNE

## (undated) DEVICE — Device: Brand: DEFENDO AIR/WATER/SUCTION AND BIOPSY VALVE

## (undated) DEVICE — 3M™ RED DOT™ MONITORING ELECTRODE WITH FOAM TAPE AND STICKY GEL, 50/BAG, 20/CASE, 72/PLT 2570: Brand: RED DOT™

## (undated) DEVICE — 1200CC GUARDIAN II: Brand: GUARDIAN

## (undated) DEVICE — SYRINGE/GUAGE ASSEMBLY

## (undated) DEVICE — CATH BALLOON CRE 18-20MM 5838

## (undated) DEVICE — GIJAW SINGLE-USE BIOPSY FORCEPS WITH NEEDLE: Brand: GIJAW

## (undated) DEVICE — FORCEP BIOPSY RJ4 LG CAP W/ND

## (undated) DEVICE — 10FT COMBINED O2 DELIVERY/CO2 MONITORING. FILTER WITH MICROSTREAM TYPE LUER: Brand: DUAL ADULT NASAL CANNULA

## (undated) DEVICE — BITEBLOCK ENDOSCP 60FR MAXI STRP

## (undated) DEVICE — FILTERLINE NASAL ADULT O2/CO2

## (undated) DEVICE — KIT VLV 5 PC AIR H2O SUCT BX ENDOGATOR CONN

## (undated) DEVICE — KIT CUSTOM ENDOPROCEDURE STERIS

## (undated) DEVICE — ENDOSCOPY PACK UPPER: Brand: MEDLINE INDUSTRIES, INC.

## (undated) DEVICE — SUBMUCOSAL LIFTING AGENT - 5ML SINGLE UNIT: Brand: EVERLIFT

## (undated) NOTE — LETTER
Consent to Procedure/Sedation    Date: 11/15/2017    Time: _______________    1. I authorize the performance upon Forrest Marie the following:    Kyphoplasty    2.  I authorize Dr. Maranda Ludwig whomever is designated as the doctor’s assistant), to Chasity Castano Witness: ____________________     Date: ______________    Printed: 11/15/2017   7:34 AM    Patient Name: Forrest Marie        : 1940       Medical Record #: [id number]

## (undated) NOTE — IP AVS SNAPSHOT
Patient Demographics     Address  [place] [address] Phone  [phone] (Home) *Preferred*  [phone] [facility]) E-mail Address  [email or web address]      Emergency Contact(s)     Name Relation Home Work Mobile    Jairo Trevizo Brimonidine Tartrate-Timolol 0.2-0.5 % Soln  Commonly known as:  COMBIGAN  Next dose due: Take this evening  Notes to patient:  For glaucoma      Apply 1 drop to eye 2 (two) times daily.    Barbara Jack MD         Calcium Citrate-Vitamin D 500-400 MG-UNIT Adri Taylor MD         lidocaine 5 % Ptch  Commonly known as:  LIDODERM  Start taking on:  11/18/2017  Next dose due:  Place another lidocaine patch tomorrow AM  Notes to patient: For Pain      Place 1 patch onto the skin daily.    Adri Taylor MD [id number] Calcium Carbonate-Vitamin D (OYSTER-D) 250-125 MG-UNIT per tab 2 tablet 11/16/17 2019 Given      [id number] Calcium Carbonate-Vitamin D (OYSTER-D) 250-125 MG-UNIT per tab 2 tablet 11/17/17 0820 Given      [id number] Calcium Carbonate-Vitamin D (Miguelina James Vitals  110/48 Filed at 11/17/2017 1208   Pulse  70 Filed at 11/17/2017 1158   Resp  18 Filed at 11/17/2017 1158   Temp  98 °F (36.7 °C) Filed at 11/17/2017 1158   SpO2  96 % Filed at 11/17/2017 1208      Lab Results Last 24 Hours    No matching results fo • High blood pressure    • Indeterminate PPD     Father had TB, seen by Dr. 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 Disp: 80 tablet Rfl: 0   docusate sodium 100 MG Oral Cap Take 100 mg by mouth 2 (two) times daily. Disp: 60 capsule Rfl: 0   PEG 3350 Oral Powd Pack Take 17 g by mouth daily.  Disp: 30 each Rfl: 0   sulfaSALAzine 500 MG Oral BP (!) 163/47 (BP Location: Left arm)   Pulse 68   Temp 98.3 °F (36.8 °C) (Oral)   Resp 18   SpO2 95%[RJ.2]     Gen- NAD, appears stated age  [de-identified]- NCAT, anicteric sclera, MMM, OP clear  Lymph- no cervical LAD  CV- RRR no murmurs.  No ROS  Lungs- CTAB, goo Constipation- bowel regimen    Ankylosing spondylitis- hold humira.  Cont sulfasalazine    Htn/hl- norvasc, lisinopril, statin    SCDs[RJ.3]    Bartolo Zhang MD  [facility] Hospitalist  Pager [phone]  Answering Service number: [phone][RJ.1]      Jimenez Schedule an appointment as soon as possible for a visit in 1 week      Akua Powell MD  [address]   [phone]      Radiologist  who performed your kyphoplasty        Please refer to prior H&P by Dr. Angy Holguin on 11/14-- Ruddy Green lidocaine 5 % Ptch  Commonly known as:  LIDODERM  Place 1 patch onto the skin daily. Start taking on:  11/18/2017  Notes to patient:   For Pain        CHANGE how you take these medications    sulfaSALAzine 500 MG Tbec  Commonly known as:  AZULFIDINE  TAKE Nausea. PEG 3350 Pack  Commonly known as:  MIRALAX  Take 17 g by mouth daily. simvastatin 20 MG Tabs  Commonly known as:  ZOCOR  Take 1 tablet (20 mg total) by mouth once daily.   Notes to patient:  For cholesterol     Vitamin D 1000 units Caps previous. Vertebroplasty changes T12. Compression fracture also noted at L2 which is less severe.     Dictated by: Lee Valerio MD on 11/13/2017 at 15:56     Approved by: Lee Valerio MD            Xr Lumbar Spine (min 4 Views) (cpt=72110)    Re retropulsion again noted. There are also possible mild compression deformities of the superior endplates of L1 and L2. There has been no significant interval change compared to the previous exam from 10/16/17.  The need for further evaluation of the lower t PROCEDURE:  XR ROUTINE THORACIC SPINE (3 VIEWS) (CPT=72072)  TECHNIQUE:  AP, lateral, and swimmer's views of the thoracic spine were obtained. COMPARISON:  [facility] , XR ROUTINE THORACIC SPINE (3 VIEWS) (CPT=72072), 10/16/2017, 20:59.   INDICATIONS:  back pa , IR KYPHOPLASTY, 11/02/2017, 7:38. [facility] , XR ABDOMEN, OBSTRUCTIVE SERIES (CPT=74020), 11/13/2017, 15:15. [facility] , XR LUMBAR SPINE (MIN 4 VIEWS) (CPT=72110), 10/24/2017, 12:06. [facility] , MRI SPINE LUMBAR (CPT=72148), 10/18/2017, 16:05.   INDICATIONS:  p Diana Green MD            Mri Spine Lumbar (UQV=70068)    Result Date: 10/18/2017  PROCEDURE:  MRI OF THE LUMBAR SPINE WITHOUT CONTRAST  COMPARISON:  [facility] , CT SPINE LUMBAR (CPT=72131), 10/18/2017, 13:59.   [facility] , MRI SPINE LUMBAR(CONTRAST ONLY) (C to standard medical therapy and limits activities of daily living. Previous DEXA scan performed at [place] [address] dated 2/22/17 demonstrated localized osteoporosis of the L4 vertebral body with a T score of -2.8.   COMPARISON:[facility] , MRI SPINE LUMBAR fluoroscopic images were obtained during trocar insertion and methylmethacrylate infusion. Table side CT scanning was performed.  The patient remained flat on the fluoroscopic table in the prone position for approximately 15 minutes and was then log rolled during the procedure. Recorded sedation time 25 minutes. TECHNIQUE: The patient was seen previously for vertebral augmentation while hospitalized. At that point she was not a candidate as there was active urinary tract infection. This has been treated.  Enma Hood This was used as the endpoint. The trochar was then manually removed and manual compression maintained above the pedicle access site for approximately 5 minutes.  Multiple spot fluoroscopic images were obtained during trocar insertion and methylmethacrylate History related to current admission: Ancelmo Santiago admit note: PMH sig for ankylosing spondylitis on humira and HTN who presents with back pain that originally started last month when she was found to have compression fracture.  She was in and out of re Comment: mild inflammatory change sigmoid colon  2/3/10: COLONOSCOPY,DIAGNOSTIC      Comment: Dr. Criss Tolentino, mild erythema of the rectosigmoid,               possibly a variant of normal, bx show                collagenous colitis, ileum normal  No date: pylori was negative  1990: UPPER GI ENDOSCOPY,DIAGNOSIS      Comment: wnl    SUBJECTIVE  \"I'm a little better today, still nauseas some\"    Patient’s self-stated goal is return home.     OBJECTIVE  Precautions: Spine    WEIGHT BEARING RESTRIC technique for supine to sit, pt completes with supervision. Pt completes sit to stand with supervision. Gait X 400 ft with r/w and supervision, no LOB noted. Pt completes review of stair training.  Completes with side step pattern with one rail with supervi assistance level: supervision, met 11/16/17   Goal #3 Patient is able to ambulate 300 feet with assist device: walker - rolling at assistance level: supervision  MET 11/17/17   Goal #4 Ascend/descend stairs with railing with supervision, met 11/16/17   [facility] • [facility]     Left    • Back problem    • Cataract     Bilateral   • Cholelithiasis    • Collagenous colitis 2/10   • Diffuse cystic mastopathy    • Disorder of bone and cartilage, unspecified     osteopenia   • Elevated blood pressure interphalageal resection arthroplasty  No date: REMOVAL OF TONSILS,10+ Y/O  2/27/12: SKIN SURGERY      Comment: EXC of an aytipcal nevus to the right nasal                ala  2-26-14: SKIN SURGERY      Comment: BCC nod to right superior helix -   Sitting down on and standing up from a chair with arms (e.g., wheelchair, bedside commode, etc.): A Little   -   Moving from lying on back to sitting on the side of the bed?: A Little   How much help from another person does the patient currently need. PT Discharge Recommendations: Home with home health PT     PLAN  PT Treatment Plan: Bed mobility; Body mechanics; Endurance; Patient education;Gait training;Stair training;Transfer training  Rehab Potential : Good  Frequency (Obs): Daily    CURRENT GOALS   Go Pt is s/p L1 kyphoplasty 11/15/17, currently off bedrest and appropriate for PT eval per rn, Nada Hashimoto.     Problem List  Principal Problem:    Intractable back pain  Active Problems:    Hypokalemia    Anemia    Azotemia    Hyperglycemia    Closed fracture of f Comment: R leg tendon surgery with post-op wound                infection  No date: OTHER SURGICAL HISTORY      Comment: right ankle surgery  5/7/13: OTHER SURGICAL HISTORY      Comment:  left 1st metatarsophalangeal fusion.  Manuel Pedraza at home recently due to increasing back pain.[SP.2]     SUBJECTIVE[SP.1]  \"I just dont want to go home too soon and have to come back a third time\"[SP.2]    Patient self-stated goal is[SP.1] to have less pain[SP.2]    OBJECTIVE  Precautions: Spine  Fall PT Approx Degree of Impairment Score: 46.58%   Standardized Score (AM-PAC Scale): 43.63   CMS Modifier (G-Code): CK    FUNCTIONAL ABILITY STATUS  Gait Assessment   Gait Assistance: Minimum assistance  Distance (ft): 200  Assistive Device: Rolling walker  P impairment often benefit from home with home PT.[SP.2]   Based on this evaluation, patient's clinical presentation is[SP.1] evolving[SP.2] and overall the evaluation complexity is considered[SP.1] moderate[SP.2].   These impairments and comorbidities manife Pt had kyphoplasty this am, will need new orders when appropriate. [SP.1]       Attribution [place]    SP.1 Susana Scott, PT on 11/15/2017 11:32 AM                        Occupational Therapy Notes (last 72 hours) (Notes from 11/14/2017  4:10 PM through 11 • Visual impairment     iritis       Past Surgical History  Past Surgical History:  No date: APPENDECTOMY  No date: BIOPSY OF BREAST, INCISIONAL      Comment: L Breast  2005: CHOLECYSTECTOMY  2/13/06: COLONOSCOPY,BIOPSY      Comment: nonspecific erythema t Comment: antral gastric erosions, distal esophageal                stricture, gastric bx, esophageal dilation                18-20 mm, Performed by Derick Clark at                Pending [facility] to [facility], Performed by                MARK GLEASON of session/findings; All patient questions and concerns addressed    ASSESSMENT   Progressing. Pt is sprv-min a adls.  Pt still has deficits in decreased strength and endurance, increased pain,decreased balance, and decreased knowledge of compensatory techni History related to current admission: Pt is a[KP.3 76year old[KP.3] female admit on 11/13/2017 for kyphoplasty. Pt with PMH of: none. Problem List[KP. 1]  Principal Problem:    Intractable back pain  Active Problems:    Hypokalemia    Anemia    Azotem 2005: OTHER AMBL SURG      Comment: R leg tendon surgery with post-op wound                infection  No date: OTHER SURGICAL HISTORY      Comment: right ankle surgery  5/7/13: OTHER SURGICAL HISTORY      Comment:  left 1st metatarsophalangeal fusion.  Theron Henry Patient Regularly Uses: Sofie Sarabia. 2]    Prior Level of Function: Pt typically independent with ADLs and mobility. Pt does not use AD. Pt enjoyed reading and time with family. SUBJECTIVE   Pt stated, \"I just cannot sit up for a long time. \"    Patient -   Taking care of personal grooming such as brushing teeth?: A Little  -   Eating meals?: None[KP. 2]    AM-PAC Score:[KP.1]  Score: 16  Approx Degree of Impairment: 53.32%  Standardized Score (AM-PAC Scale): 35.96  CMS Modifier (G-Code): CK[KP. 2]    FUNCT Client Assessment/Performance Deficits MODERATE - Comorbidities and min to mod modifications of tasks    Clinical Decision Making MODERATE - Analysis of occupational profile, detailed assessments, several treatment options    Overall Complexity MODERATE[KP Pneumococcal (Prevnar 13) 12/03/14     Pneumovax 11/17/16     Pneumovax 23 11/17/16     Prolia Im Inj, Up To 60 Mg 04/26/16     Prolia Im Inj, Up To 60 Mg 04/30/15     TDAP 06/06/08     Technetium Tc99mm Sestamibi, Iv, Up To 40 Millicuries 49/53/27

## (undated) NOTE — IP AVS SNAPSHOT
Patient Demographics     Address  [place] [address] Phone  [phone] (Home) *Preferred*  [phone] [facility]) E-mail Address  [email or web address]      Emergency Contact(s)     Name Relation Home Work Mobile    Jairo Trevizo Nuris Al MD         Albuterol Sulfate  (90 Base) MCG/ACT Aers      Inhale 2 puffs into the lungs every 6 (six) hours as needed (cough).    Maxine Kohli MD         AmLODIPine Besylate 5 MG Tabs  Commonly known as:  NORVASC      Take 1 tablet TAKE 2 TABLETS (1,000 MG TOTAL) BY MOUTH 2 (TWO) TIMES DAILY. Kain Do MD         TraMADol HCl 50 MG Tabs  Commonly known as:  ULTRAM      Take 1 tablet (50 mg total) by mouth every 6 (six) hours as needed for Pain.    MD Ava Kim Chamber [id number] Metoclopramide HCl (REGLAN) injection 10 mg 10/12/17 0911 Given      [id number] Senna (SENOKOT) tab TABS 8.6 mg 10/11/17 2013 Given      [id number] atorvastatin (LIPITOR) tab 10 mg 10/11/17 2013 Given      [id number] docusate sodium (COLACE) cap 10 Order Status:  Completed Lab Status:  Preliminary result Updated:  10/11/17 1800    Specimen:  Blood from Blood,peripheral      Blood Culture Result No Growth 4 Days    Blood Culture FREQ X 2 [[id number]] Collected:  10/07/17 1730    Order Status:  Jose Antonio Chapin Father had TB, seen by Dr. Deirdre Jacobo, treated for 9 months   • Intestinal disaccharidase deficiencies and disaccharide malabsorption    • Other and unspecified hyperlipidemia    • Rheumatoid arthritis and other inflammatory polyarthropathies     RA   • Comment: w/BSO  5/4/09: UP GI ENDOSCOPY,BALL DIL,30MM  6/12/14: UP GI ENDOSCOPY,BALL DIL,30MM      Comment: distal esophageal stricture, bx and dilated                18-20 mm  5/4/09: UPPER GI ENDOSCOPY,BIOPSY      Comment: antral gastric erosions, di Current Outpatient Prescriptions on File Prior to Encounter:  TraMADol HCl 50 MG Oral Tab Take 1 tablet (50 mg total) by mouth every 6 (six) hours as needed for Pain.  Disp: 30 tablet Rfl: 0   AcetaZOLAMIDE  MG Oral Capsule SR 12 Hr Take 1 capsule (50 Tacrolimus (PROTOPIC) 0.03 % Apply Externally Ointment Apply to AAs of groin and underarms BID (Patient taking differently: Apply to AAs of groin and underarms BID PRN) Disp: 60 g Rfl: 2   Cholecalciferol (VITAMIN D) 1000 UNIT Oral Cap 1 CAPSULE TWICE BECCA ID.1 Joe Eid MD on 10/6/2017  4:48 PM  ID. 2 - Megan Humphrey MD on 10/6/2017  4:51 PM                        Discharge Summaries - D/C Summary      Discharge Summaries signed by Mathew Reyes MD at 10/12/2017  3:23 PM  Version 1 of 1    Aut spondylitis, GERD was recently treated for UTI and thus stopped Humira for her back issues(now restarted) Now developed severe back pain, per patient worst today, started while truying to get out of the shower,.  To me patient stated that patient localized -ESR and CRP are elevated wihtout a clear explanation (ESR was normal in the past, this rapid rise is worrisome for infection).  I spoke with patient's rheumatologist on 10/9/17      Preventative SCDs    Day of discharge Exam    10/12/17  1200   BP: (!) 17 APPLY TO AFFECTED AREA(S) TWO TIMES A DAY     Fluticasone Propionate 50 MCG/ACT Susp  Commonly known as:  FLONASE  1 spray by Nasal route 2 (two) times daily.      IMODIUM MULTI-SYMPTOM RELIEF OR     simvastatin 20 MG Tabs  Commonly known as:  ZOCOR  TAKE O STATED HISTORY:(As transcribed by Technologist)  Evaluate sinuses and left parotid gland for infectious process. Swelling to left neck. Patient complains to generalized body pain.    CONTRAST USED:  50cc of Omnipaque 350  FINDINGS:  NASOPHARYNX:  Fossae of of imaging planes and imaging parameters to optimize visualization of suspected pathology.   Images were performed after the administration of intravenous gadolinium contrast.  CONTRAST USED:  15 cc of paramagnetic gadolinium-based contrast was injected int spine from chronic use. FINDINGS:    Lumbar vertebral alignment is within normal limits. No acute fractures or osseous lesions are identified. Mild degenerative disc disease. Vascular calcifications. Cholecystectomy clips.        CONCLUSION:  No acute f in the epidural space adjacent to the left vertebral artery at the level of the dual is noted. THORACIC DISC LEVELS: FINDINGS  There is normal thoracic kyphosis with anatomic alignment.   There is a subacute superior endplate compression fracture o Filed:  10/11/2017  3:29 PM Date of Service:  10/11/2017  3:15 PM Status:  Signed    :  Jimmy Bailey PT (Physical Therapist)        PHYSICAL THERAPY TREATMENT NOTE - INPATIENT    Room Number: 385/385-A     Session: 3  Number of Visits to Meet Estab No date: NEEDLE BIOPSY LEFT      Comment: pt cannot remember  No date: NEEDLE BIOPSY RIGHT      Comment: pt cannot remember  2005: OTHER AMBL SURG      Comment: R leg tendon surgery with post-op wound                infection  No date: OTHER SURGICAL HISTO PAIN ASSESSMENT   Ratin  Location: Back  Management Techniques:  Activity promotion;Repositioning    BALANCE                                                                                                                     Static Sitting: G trial. Pt requires 2 seated rest breaks on return to room due to nausea and dizziness. BP upon return to room 146/67. Pt returns to supine with min assist for LE's only with cues for log roll technique for pain management.      THERAPEUTIC EXERCISES  Lower Goal Comments: Goals established on 10/7/2017. Ongoing and goals 4 and 5 added 10/11/17. [MD.1]       Attribution [place]    MD.1 - Leslie Guo, PT on 10/11/2017  3:15 PM               Physical Therapy Note signed by Woodroe Curling, PTA at 10/10/2017  2:50 marked acute & chronic colitis), ileum [facility]  1990: COLONOSCOPY,DIAGNOSTIC      Comment: mild inflammatory change sigmoid colon  2/3/10: COLONOSCOPY,DIAGNOSTIC      Comment: Dr. Stephanie Hernández, mild erythema of the rectosigmoid,               possibly a 2/10: UPPER GI ENDOSCOPY,BIOPSY      Comment: 3 gastric ulcers from diclofenac, bx for H.                pylori was negative  1990: UPPER GI ENDOSCOPY,DIAGNOSIS      Comment: wnl    SUBJECTIVE  \" I just haven't been getting any rest.\"    Patient’s self-s was trained on stairs via step to pattern and CGA. Pt needed one seated rest in between activity and then amb back to room 120' with sup.  was trained on donning and doff of TLSO.    THERAPEUTIC EXERCISES  Lower Extremity Ankle pumps     Upper Extrem PHYSICAL THERAPY TREATMENT NOTE - INPATIENT    Room Number: 385/385-A     Session: 1  Number of Visits to Meet Established Goals: 3    Presenting Problem: Back pain        MRI conclusion from 10/8/17.  Subacute moderate to marked superior endplate compress Comment: R leg tendon surgery with post-op wound                infection  No date: OTHER SURGICAL HISTORY      Comment: right ankle surgery  5/7/13: OTHER SURGICAL HISTORY      Comment:  left 1st metatarsophalangeal fusion.  Oval Deis BALANCE                                                                                                                     Static Sitting: Normal  Dynamic Sitting: Good           Static Standing: Fair +  Dynamic Standing: Fair +    ACTIVITY TOLERANCE[BR.1 Repetitions[BR.1]   12[BR.2]   Sets[BR.1]   1[BR.2]     Patient End of Session: Up in chair;Needs met;Call light within reach; All patient questions and concerns addressed; Family present    ASSESSMENT[BR.1]   Pt reported of increased pain at all times.  Nataly Ace Name Date      Fluad 0.5ml 10/12/17     HEP A 06/06/08     INFLUENZA 09/11/16     INFLUENZA 09/01/16     INFLUENZA 09/29/15     INFLUENZA 11/10/14     INFLUENZA 10/18/13     INFLUENZA 10/06/11     INFLUENZA 10/07/09     INFLUENZA 10/13/08     INFLUENZA 10

## (undated) NOTE — ED AVS SNAPSHOT
Sophia Castro   MRN: [id number]    Department:  [facility] Emergency Department   Date of Visit:  10/24/2017           Disclosure     Insurance plans vary and the physician(s) referred by the ER may not be covered by your plan.  Please contact yo If you have been prescribed any medication(s), please fill your prescription right away and begin taking the medication(s) as directed    If the emergency physician has read X-rays, these will be re-interpreted by a radiologist.  If there is a significant

## (undated) NOTE — LETTER
[facility]  Ilana [address]ik 59 [address]  Consent for Procedure/Sedation  Date: 12/29/23         Time: 0730    I hereby authorize Dr Garth Leonard, my physician and his/her assistants (if applicable), which may include medical students, residents, and/or fellows, to perform the following surgical operation/ procedure and administer such anesthesia as may be determined necessary by my physician: Kyphoplasty on Edis Avendaño  2.   I recognize that during the surgical operation/procedure, unforeseen conditions may necessitate additional or different procedures than those listed above. I, therefore, further authorize and request that the above-named surgeon, assistants, or designees perform such procedures as are, in their judgment, necessary and desirable. 3.   My surgeon/physician has discussed prior to my surgery the potential benefits, risks and side effects of this procedure; the likelihood of achieving goals; and potential problems that might occur during recuperation. They also discussed reasonable alternatives to the procedure, including risks, benefits, and side effects related to the alternatives and risks related to not receiving this procedure. I have had all my questions answered and I acknowledge that no guarantee has been made as to the result that may be obtained. 4.   Should the need arise during my operation/procedure, which includes change of level of care prior to discharge, I also consent to the administration of blood and/or blood products. Further, I understand that despite careful testing and screening of blood or blood products by collecting agencies, I may still be subject to ill effects as a result of receiving a blood transfusion and/or blood products.   The following are some, but not all, of the potential risks that can occur: fever and allergic reactions, hemolytic reactions, transmission of diseases such as Hepatitis, AIDS and Cytomegalovirus (CMV) and fluid overload. In the event that I wish to have an autologous transfusion of my own blood, or a directed donor transfusion, I will discuss this with my physician. Check only if Refusing Blood or Blood Products  I understand refusal of blood or blood products as deemed necessary by my physician may have serious consequences to my condition to include possible death. I hereby assume responsibility for my refusal and release the hospital, its personnel, and my physicians from any responsibility for the consequences of my refusal.         o  Refuse         5. I authorize the use of any specimen, organs, tissues, body parts or foreign objects that may be removed from my body during the operation/procedure for diagnosis, research or teaching purposes and their subsequent disposal by hospital authorities. I also authorize the release of specimen test results and/or written reports to my treating physician on the hospital medical staff or other referring or consulting physicians involved in my care, at the discretion of the Pathologist or my treating physician. 6.   I consent to the photographing or videotaping of the operations or procedures to be performed, including appropriate portions of my body for medical, scientific, or educational purposes, provided my identity is not revealed by the pictures or by descriptive texts accompanying them. If the procedure has been photographed/videotaped, the surgeon will obtain the original picture, image, videotape or CD. The hospital will not be responsible for storage, release or maintenance of the picture, image, tape or CD.    7.   I consent to the presence of a  or observers in the operating room as deemed necessary by my physician or their designees. 8.   I recognize that in the event my procedure results in extended X-Ray/fluoroscopy time, I may develop a skin reaction. 9.  If I have a Do Not Attempt Resuscitation (DNAR) order in place, that status will be suspended while in the operating room, procedural suite, and during the recovery period unless otherwise explicitly stated by me (or a person authorized to consent on my behalf). The surgeon or my attending physician will determine when the applicable recovery period ends for purposes of reinstating the DNAR order. 10. Patients having a sterilization procedure: I understand that if the procedure is successful the results will be permanent and it will therefore be impossible for me to inseminate, conceive, or bear children. I also understand that the procedure is intended to result in sterility, although the result has not been guaranteed. 11. I acknowledge that my physician has explained sedation/analgesia administration to me including the risk and benefits I consent to the administration of sedation/analgesia as may be necessary or desirable in the judgment of my physician.     I CERTIFY THAT I HAVE READ AND FULLY UNDERSTAND THE ABOVE CONSENT TO OPERATION and/or OTHER PROCEDURE.        ____________________________________       _________________________________      ______________________________  Signature of Patient         Signature of Responsible Person        Printed Name of Responsible Person        ____________________________________      _________________________________      ______________________________       Signature of Witness          Relationship to Patient                       Date                                       Time  Patient Name: Trudy Pizarro     : 1940                 Printed: 2023      Medical Record #: [id number]                      Page 1 of 1

## (undated) NOTE — ED AVS SNAPSHOT
Dwaine Hung   MRN: [id number]    Department:  [facility] Emergency Department   Date of Visit:  9/24/2017           Disclosure     Insurance plans vary and the physician(s) referred by the ER may not be covered by your plan.  Please contact you If you have been prescribed any medication(s), please fill your prescription right away and begin taking the medication(s) as directed    If the emergency physician has read X-rays, these will be re-interpreted by a radiologist.  If there is a significant

## (undated) NOTE — IP AVS SNAPSHOT
Patient Demographics     Address  [address]    [place] [address] Phone  [phone] (Home)  [phone] (Mobile) *Preferred* E-mail Address  [email or web address]      Patient Contacts     Name Relation Home Work Mobile    MICH AYALA Spouse [phone]    KATIE MCGEE Friend [phone]    PRESTON HUIZAR Friend [phone]      Allergies as of 1/20/2024  Review status set to Review Complete on 1/12/2024       Noted Reaction Type Reactions    Adhesive Tape 03/25/2021    HIVES, OTHER (SEE COMMENTS)    ZIOpatch adhesive rash    Latex 06/28/2021   Systemic UNKNOWN    Added based on information entered during case entry, please review and add reactions, type, and severity as needed    DELETED: Milk 04/04/2007   Intolerance UNKNOWN    DELETED: Proton Pump Inhibitors 05/04/2009    DIARRHEA    With Prilosec, Aciphex, Nexium    DELETED: Risedronate Sodium 05/02/2013   Side Effect OTHER (SEE COMMENTS)    Stopped 4/2013 due to side effects      Code Status Information     Code Status    Full Code        Patient Instructions       Kyphoplasty  Kyphoplasty is a procedure that can help relieve the pain of vertebral compression fracture. This is a collapse of bone in your spine most commonly caused by osteoporosis. It does this by strengthening your spine (vertebrae) with special surgical cement. The procedure usually takes  30 to 45 minutes.   Preparing for the procedure  Tell your healthcare provider about all medicines you take. This includes over-the-counter medicines, herbs, vitamins, and other supplements.   Ask your healthcare provider if you should stop taking any medicines, such as blood thinners, before the procedure.  Follow any directions you are given for not eating or drinking before surgery.  Arrange for an adult family member or friend to drive you home.  During the procedure    Your healthcare provider will give you anesthesia. This is medicine to  keep you from feeling pain during the procedure. During kyphoplasty:   Your surgeon makes 1 or more tiny incisions in your back.  Using live video X-ray images (fluoroscopy) as a guide, your surgeon inserts a hollow tube through the incision (cut) into the collapsed vertebra.  A small balloon is passed through the tube into the vertebra. There it's inflated to open a space.  Typically, the balloon is then removed, and the empty space is filled with special cement for bones.  Risks and possible complications  Kyphoplasty is considered safe. If complications do happen, they may include:   Spinal cord or nerve damage  Cement leakage in blood vessels, or against the spinal cord  Heart or lung problems  New or unrelieved back pain  Infection  Allergy to the cement (rare)  Fractures of the neighboring vertebrae  After the procedure  You will be sent to a recovery room after the procedure. Usually, you will go home later the same day. Or, you may stay the night in a hospital room. Once you’re ready to go home, your healthcare provider will tell you how to care for yourself.   When to call your healthcare provider  Call your healthcare provider right away if any of thefollowing occur:   Fever of  100.4° F ( 38.0°C)  or higher, or as directed by your healthcare provider  New pain, weakness, tingling, or numbness in your legs  New or unrelieved back pain  [employer] last reviewed this educational content on 3/1/2022  © 7593-4326 [employer]. All rights reserved. This information is not intended as a substitute for professional medical care. Always follow your healthcare professional's instructions.           Follow-up Information     Cathy Martinez MD Follow up in 2 week(s).    Specialty: Internal Medicine  Contact information:  [address] [facility] [address] [place] [address] [id number] [phone]             Kyphoplasty. Go to.    Why: 1/23/24                      Your Home Meds List      TAKE these medications        Instructions Authorizing Provider Morning Afternoon Evening As Needed   acetaminophen 500 MG Tabs  Commonly known as: Tylenol Extra Strength      Take 2 tablets (1,000 mg total) by mouth every 6 (six) hours as needed for Pain.          albuterol 108 (90 Base) MCG/ACT Aers  Commonly known as: Ventolin HFA      Inhale 2 puffs into the lungs every 4 (four) hours as needed for Wheezing.   Mike Cheek         amLODIPine 10 MG Tabs  Commonly known as: Norvasc      Take 1 tablet (10 mg total) by mouth daily.          baclofen 10 MG Tabs  Commonly known as: Lioresal      Take 1 tablet (10 mg total) by mouth 3 (three) times daily.          benzonatate 100 MG Caps  Commonly known as: Tessalon      Take 1 capsule (100 mg total) by mouth 3 (three) times daily as needed for cough.   Mike Cheek         bisacodyl 10 MG Supp  Commonly known as: Dulcolax      Place 1 suppository (10 mg total) rectally daily as needed (constipation).   Mike Cheek         Calcium Citrate-Vitamin D 500-400 MG-UNIT Chew      Chew by mouth 2 (two) times daily. Vitamin D 5000 iu daily          cefadroxil 500 MG Caps  Commonly known as: DURICEF  Start taking on: January 21, 2024      Take 1 capsule (500 mg total) by mouth 2 (two) times daily.   Mike Cheek         Combigan 0.2-0.5 % Soln  Generic drug: Brimonidine Tartrate-Timolol      Place 1 drop into the left eye 2 (two) times daily.   Damián Giraldo         docusate sodium 100 MG Caps  Commonly known as: COLACE      Take 100 mg by mouth 2 (two) times daily.   Mike Cheek         folic acid 1 MG Tabs  Commonly known as: Folvite      Take 1 tablet (1 mg total) by mouth daily.   Yusuf Rosado         gabapentin 100 MG Caps  Commonly known as: Neurontin      Take 1 capsule (100 mg total) by mouth 3 (three) times daily as needed (pain).   Mike Cheek         HYDROmorphone 2 MG Tabs  Commonly known as: Dilaudid      Take 1 tablet (2 mg total) by mouth every 4 (four)  hours as needed for Pain.   Mike Cheek         losartan 50 MG Tabs  Commonly known as: Cozaar      Take 1 tablet (50 mg total) by mouth 2 (two) times daily.   Mike Cheek         Methotrexate Sodium 5 MG Tabs      Take 8 tablets (40 mg total) by mouth every 7 days. Pt takes every monday          metoprolol succinate ER 25 MG Tb24  Commonly known as: Toprol XL      Take 1 tablet (25 mg total) by mouth in the morning and 1 tablet (25 mg total) before bedtime. 2 TABS EVERY MORNING AND 1 TAN IN EVENING.   Evert Asencio         Netarsudil Dimesylate 0.02 % Soln  Commonly known as: Rhopressa      Apply 1 drop to eye at bedtime.          omeprazole 20 MG Cpdr  Commonly known as: PriLOSEC      Take 1 capsule (20 mg total) by mouth every morning before breakfast.          Polyethylene Glycol 3350 17 g Pack  Commonly known as: MIRALAX  Start taking on: January 21, 2024      Take 17 g by mouth daily.   Mike Cheek         sennosides 8.6 MG Tabs  Commonly known as: Senokot      Take 1 tablet (8.6 mg total) by mouth at bedtime.   Mike Cheek         simvastatin 20 MG Tabs  Commonly known as: Zocor      Take 1 tablet (20 mg total) by mouth nightly.   Mike Cheek         sulfaSALAzine 500 MG Tabs  Commonly known as: Azulfidine      Take 2 tablets (1,000 mg total) by mouth 2 (two) times daily.   Yusuf Rosado         Vitamin D 1000 units Caps      Take by mouth. 2000 DAILY                Where to Get Your Medications      Please  your prescriptions at the location directed by your doctor or nurse    Bring a paper prescription for each of these medications  HYDROmorphone 2 MG Tabs           [id number] - MAR ACTION REPORT  (last 48 hrs)    ** SITE UNKNOWN **     Order ID Medication Name Action Time Action Reason Comments    [id number] HYDROmorphone (Dilaudid) tab 2 mg 01/18/24 1454 Given      [id number] HYDROmorphone (Dilaudid) tab 2 mg 01/18/24 2024 Given      [id number] HYDROmorphone (Dilaudid) tab  2 mg 01/19/24 0053 Given      [id number] HYDROmorphone (Dilaudid) tab 2 mg 01/19/24 0628 Given      [id number] HYDROmorphone (Dilaudid) tab 2 mg 01/19/24 1134 Given      [id number] HYDROmorphone (Dilaudid) tab 2 mg 01/19/24 2223 Given      [id number] HYDROmorphone (Dilaudid) tab 2 mg 01/20/24 0416 Given      [id number] HYDROmorphone (Dilaudid) tab 2 mg 01/20/24 0828 Given      [id number] albuterol (Ventolin HFA) 108 (90 Base) MCG/ACT inhaler 2 puff 01/18/24 1453 Given      [id number] albuterol (Ventolin HFA) 108 (90 Base) MCG/ACT inhaler 2 puff 01/18/24 1822 Given      [id number] albuterol (Ventolin HFA) 108 (90 Base) MCG/ACT inhaler 2 puff 01/18/24 2030 Given      [id number] albuterol (Ventolin HFA) 108 (90 Base) MCG/ACT inhaler 2 puff 01/19/24 0816 Given      [id number] albuterol (Ventolin HFA) 108 (90 Base) MCG/ACT inhaler 2 puff 01/19/24 1403 Given      [id number] albuterol (Ventolin HFA) 108 (90 Base) MCG/ACT inhaler 2 puff 01/19/24 1724 Given      [id number] albuterol (Ventolin HFA) 108 (90 Base) MCG/ACT inhaler 2 puff 01/19/24 2225 Given      [id number] albuterol (Ventolin HFA) 108 (90 Base) MCG/ACT inhaler 2 puff 01/20/24 0900 Given      [id number] albuterol (Ventolin HFA) 108 (90 Base) MCG/ACT inhaler 2 puff 01/20/24 1211 Given      [id number] amLODIPine (Norvasc) tab 5 mg 01/19/24 0817 Given      [id number] amLODIPine (Norvasc) tab 5 mg 01/20/24 0828 Given      [id number] benzonatate (Tessalon) cap 100 mg 01/18/24 1454 Given      [id number] benzonatate (Tessalon) cap 100 mg 01/18/24 2024 Given      [id number] benzonatate (Tessalon) cap 100 mg 01/19/24 0817 Given      [id number] benzonatate (Tessalon) cap 100 mg 01/19/24 1403 Given      [id number] benzonatate (Tessalon) cap 100 mg 01/19/24 2224 Given      [id number] benzonatate (Tessalon) cap 100 mg 01/20/24 0828 Given      [id number] bisacodyl (Dulcolax) 10 MG rectal suppository 10 mg 01/19/24 1134 Given      [id number] brimonidine (Alphagan) 0.2 % ophthalmic solution 1 drop  01/18/24 2029 Given      [id number] brimonidine (Alphagan) 0.2 % ophthalmic solution 1 drop 01/19/24 0816 Given      [id number] brimonidine (Alphagan) 0.2 % ophthalmic solution 1 drop 01/19/24 2224 Given      [id number] brimonidine (Alphagan) 0.2 % ophthalmic solution 1 drop 01/20/24 0831 Given      [id number] cefTRIAXone (Rocephin) 1 g in D5W 100 mL IVPB-ADD 01/18/24 1453 New Bag      [id number] cefTRIAXone (Rocephin) 1 g in D5W 100 mL IVPB-ADD 01/19/24 1403 New Bag      [id number] cefTRIAXone (Rocephin) 1 g in D5W 100 mL IVPB-ADD 01/20/24 1205 New Bag      [id number] docusate sodium (Colace) cap 100 mg 01/18/24 2024 Given      [id number] docusate sodium (Colace) cap 100 mg 01/19/24 0817 Given      [id number] docusate sodium (Colace) cap 100 mg 01/20/24 0828 Given      [id number] losartan (Cozaar) tab 50 mg 01/18/24 2024 Given      [id number] losartan (Cozaar) tab 50 mg 01/19/24 0817 Given      [id number] losartan (Cozaar) tab 50 mg 01/19/24 2224 Given      [id number] losartan (Cozaar) tab 50 mg 01/20/24 0828 Given      [id number] melatonin tab 3 mg 01/18/24 2024 Given      [id number] melatonin tab 3 mg 01/19/24 2224 Given      [id number] metoprolol succinate ER (Toprol XL) 24 hr tab 25 mg 01/18/24 1822 Given      [id number] metoprolol succinate ER (Toprol XL) 24 hr tab 25 mg 01/19/24 0628 Given  /61 ,HR 66    [id number] metoprolol succinate ER (Toprol XL) 24 hr tab 25 mg 01/19/24 1724 Given      [id number] metoprolol succinate ER (Toprol XL) 24 hr tab 25 mg 01/20/24 0416 Given      [id number] nirmatrelvir-ritonavir (Paxlovid) 300-100 MG therapy pack 3 tablet 01/18/24 2028 Given      [id number] nirmatrelvir-ritonavir (Paxlovid) 300-100 MG therapy pack 3 tablet 01/19/24 0816 Given      [id number] nirmatrelvir-ritonavir (Paxlovid) 300-100 MG therapy pack 3 tablet 01/19/24 2225 Given      [id number] nirmatrelvir-ritonavir (Paxlovid) 300-100 MG therapy pack 3 tablet 01/20/24 0830 Given      [id number] ondansetron (Zofran) 4 MG/2ML  injection 01/19/24 2237 Given      [id number] polyethylene glycol (PEG 3350) (Miralax) 17 g oral packet 17 g 01/19/24 0817 Given      [id number] polyethylene glycol (PEG 3350) (Miralax) 17 g oral packet 17 g 01/20/24 0828 Given      [id number] sennosides (Senokot) tab 8.6 mg 01/18/24 2024 Given      [id number] sulfaSALAzine (Azulfidine) tab 1,000 mg 01/18/24 2226 Given      [id number] sulfaSALAzine (Azulfidine) tab 1,000 mg 01/19/24 0817 Given      [id number] sulfaSALAzine (Azulfidine) tab 1,000 mg 01/20/24 0828 Given      [id number] timolol (Timoptic) 0.5 % ophthalmic solution 1 drop 01/18/24 2029 Given      [id number] timolol (Timoptic) 0.5 % ophthalmic solution 1 drop 01/19/24 0816 Given      [id number] timolol (Timoptic) 0.5 % ophthalmic solution 1 drop 01/19/24 2224 Given      [id number] timolol (Timoptic) 0.5 % ophthalmic solution 1 drop 01/20/24 0831 Given            BILATERAL LOWER ABDOMEN     Order ID Medication Name Action Time Action Reason Comments    [id number] enoxaparin (Lovenox) 40 MG/0.4ML SUBQ injection 40 mg 01/20/24 0828 Given            LEFT LOWER ABDOMEN     Order ID Medication Name Action Time Action Reason Comments    [id number] enoxaparin (Lovenox) 40 MG/0.4ML SUBQ injection 40 mg 01/19/24 0817 Given              Recent Vital Signs    Flowsheet Row Most Recent Value   /41 Filed at 01/20/2024 1148   Pulse 58 Filed at 01/20/2024 1148   Resp 16 Filed at 01/20/2024 1148   Temp 98.3 °F (36.8 °C) Filed at 01/20/2024 1148   SpO2 94 % Filed at 01/20/2024 1148      Patient's Most Recent Weight    Flowsheet Row Most Recent Value   Patient Weight 73 kg (161 lb)      Lab Results Last 24 Hours    No matching results found     Microbiology Results (All)     Procedure Component Value Units Date/Time    Urine Culture, Routine [[id number]]  (Abnormal)  (Susceptibility) Collected: 01/16/24 3246    Order Status: Completed Lab Status: Final result Updated: 01/19/24 2019    Specimen: Urine, clean catch      Urine  Culture 50,000-99,000 CFU/ML Proteus mirabilis    Susceptibility      Proteus mirabilis      Not Specified     Ampicillin >=32  Resistant     Ampicillin + Sulbactam >=32  Resistant     Cefazolin >=64  Resistant     Cefepime <=1  Sensitive     Ceftriaxone 2  Intermediate     Ciprofloxacin 2  Intermediate     Gentamicin 4  Sensitive     Levofloxacin 4  Intermediate     Meropenem <=0.25  Sensitive     Nitrofurantoin 128  Resistant     Piperacillin + Tazobactam <=4  Sensitive     Trimethoprim/Sulfa >=320  Resistant                          $$$$Respiratory Flu Expanded Panel + Covid-19$$$$ [[id number]]  (Abnormal) Collected: 01/15/24 1257    Order Status: Completed Lab Status: Final result Updated: 01/15/24 8490    Specimen: Other from Nasopharyngeal swab      SARS-CoV-2 PCR: Detected     Adenovirus PCR: Not Detected     Coronavirus 229E PCR: Not Detected     Coronavirus Hku1 PCR: Not Detected     Coronavirus Nl63 PCR: Not Detected     Coronavirus Oc43 PCR: Not Detected     Metapneumovirus PCR: Not Detected     Rhinovirus/Entero PCR: Not Detected     Influenza A PCR: Not Detected     Influenza B PCR: Not Detected     Parainfluenza 1 PCR: Not Detected     Parainfluenza 2 PCR Not Detected     Parainfluenza 3 PCR Not Detected     Parainfluenza 4 PCR Not Detected     Resp Syncytial Virus PCR Not Detected     Bordetella Pertussis PCR Not Detected     Bordetella Parapertussis PCR Not Detected     Chlamydia pneumonia PCR: Not Detected     Mycoplasma pneumonia PCR: Not Detected    Narrative:      This test is intended for the simultaneous qualitative detection and differentiation of nucleic acids from multiple viral and bacterial respiratory organisms, including nucleic acid from Severe Acute Respiratory Syndrome Coronavirus 2 (SARS-CoV-2) in nasopharyngeal swab from individuals suspected of respiratory viral infection consistent with COVID-19 by their healthcare provider.    Test performed using the [employer] Respiratory Panel 2.1  (RP2.1) assay on the [employer] 2.0 System, [employer], [employer], [place] [address].    This test is being used under the Food and Drug Administration's Emergency Use Authorization.    The authorized Fact Sheet for Healthcare Providers for this assay is available upon request from the laboratory.    SARS and MERS coronaviruses are not tested on this assay.         H&P - H&P Note      H&P signed by Jayson Govea MD at 2024  3:32 PM  Version 1 of 1    Author: Jayson Govea MD Service: Hospitalist Author Type: Physician    Filed: 2024  3:32 PM Date of Service: 2024  8:13 AM Status: Signed    : Jayson Govea MD (Physician)         [facility]    History and Physical     Emma Trevizo Patient Status:  Inpatient    1940 MRN [id number]   Location [facility] 3SW-A Attending Jayson Govea MD   Hosp Day # 1 PCP Cathy Martinez MD     Chief Complaint:   Chief Complaint   Patient presents with    Pain       History of Present Illness: Emma Trevizo is a 83 year old female with history of ankylosing spondylitis, multiple compression fractures requiring kyphoplasty in the past, recent atraumatic T9 compression fracture on  s/p kyphoplasty  who presents for evaluation of persistent uncontrolled pain since discharge home.  Per notes the patient Reported her pain was controlled with Norco following kyphoplasty and PT evaluated the patient with recommendations for home with [facility].      Since then, she has repeatedly contacted her PCP with complaints of uncontrolled pain.  She was evaluated on 2024 with no acute injury noted and discharged home.  Her Norco was increased to 2 tabs then eventually changed to oxycodone yesterday.  She was also started on baclofen on 1/10 and at that time stated her pain was tolerable with regimen.  She again contacted her PCP  and her Norco was changed to oxycodone .  She says her pain suddenly worsened immediately after taking  oxycodone, and she called her PCP stating she was unable to \"hold down food/fluids\" was advised to go to the ED.  Patient states that she was told by PCP if her pain is uncontrolled that she needs to go to the ER.  She was referred to spine surgery as well as pain management but has not had a chance to establish care.    patient describes her pain as mid back, radiating about her rib cage and worse with movement.  It is not pleuritic, does not radiate down the legs and has no associated numbness or focal weakness.  She has no urinary hesitancy or incontinence.    On arrival she was afebrile and hemodynamically stable, SpO2 % on room air. CMP and CBC normal. Lipase normal. Troponin normal. CT Abd/Pelvis suggestive of enteritis, otherwise unremarkable. She was given IVF and morphine and admitted.  This morning she continues to endorse the same pain and denies any new or worsening symptoms.    Past Medical History:  Past Medical History:   Diagnosis Date    Ankylosing spondylitis (HCC)     BACK SHOULDERS ARMS- receives an infusion  every 2 mos    Aortic atherosclerosis (HCC) 06/27/2018    Chart review.  Noted on CT 3/1/18.    Arrhythmia     PALPATATIONS    Arteritis, unspecified (HCC)     Left     Back problem     Cataract     Bilateral    Cholelithiasis     Collagenous colitis 02/2010    Diffuse cystic mastopathy     Disorder of bone and cartilage, unspecified     osteopenia    Elevated blood pressure reading without diagnosis of hypertension     Esophageal reflux     Glaucoma     left eye    High blood pressure     High cholesterol     Indeterminate PPD     Father had TB, seen by Dr. Ott, treated for 9 months    Intestinal disaccharidase deficiencies and disaccharide malabsorption     Muscle weakness     USES CANE    Nausea and vomiting, unspecified vomiting type 1/12/2024    Other and unspecified hyperlipidemia     Problems with swallowing     COFFEE WATER COUGHING SPELL AFTER TAKING    Rheumatoid  arthritis and other inflammatory polyarthropathies     RA    Visual impairment     iritis        Past Surgical History:   Past Surgical History:   Procedure Laterality Date    APPENDECTOMY      BIOPSY OF BREAST, INCISIONAL      L Breast    CATARACT      CHOLECYSTECTOMY  2005    COLONOSCOPY,BIOPSY  02/13/2006    nonspecific erythema transverse colon (bx marked acute & chronic colitis), ileum wnl    COLONOSCOPY,DIAGNOSTIC  1990    mild inflammatory change sigmoid colon    COLONOSCOPY,DIAGNOSTIC  02/03/2010    Dr. Rodarte, mild erythema of the rectosigmoid, possibly a variant of normal, bx show collagenous colitis, ileum normal    EGD N/A 08/09/2021    Procedure: ESOPHAGOGASTRODUODENOSCOPY WITH DILATION with bxs, COLONOSCOPY with bxs;  Surgeon: Yaya Cook MD;  [facility]    HEMORRHOIDECTOMY,INT/EXT,SIMPLE      NEEDLE BIOPSY LEFT      pt cannot remember    NEEDLE BIOPSY RIGHT      pt cannot remember    OTHER AMBL SURG  2005    R leg tendon surgery with post-op wound infection    OTHER SURGICAL HISTORY      right ankle surgery    OTHER SURGICAL HISTORY  05/07/2013     left 1st metatarsophalangeal fusion. Weil osteotomy 2nd metatarsal joint and metatarsophalgeal capsulotomy and extensor digitorum longus lengthening ans 2nd hammer toe. 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    SKIN SURGERY Left 11/19/2015    ESC of SCC to left lateral malleolus    SKIN SURGERY  03/29/2022    BCC- left posterior postauricular neck, MMS     TOTAL ABDOM HYSTERECTOMY      w/BSO    UP GI ENDOSCOPY,BALL DIL,30MM  05/04/2009    UP GI ENDOSCOPY,BALL DIL,30MM  06/12/2014    distal esophageal stricture, bx and dilated 18-20 mm    UPPER GI  ENDOSCOPY,BIOPSY  2009    antral gastric erosions, distal esophageal stricture, gastric bx, esophageal dilation 18-20 mm, Performed by RACHEL GLEASON at [facility], Performed by RACHEL GLEASON at [facility]    UPPER GI ENDOSCOPY,BIOPSY  2010    3 gastric ulcers from diclofenac, bx for H. pylori was negative    UPPER GI ENDOSCOPY,DIAGNOSIS      wnl       Social History:  reports that she has never smoked. She has never used smokeless tobacco. She reports that she does not currently use alcohol. She reports that she does not use drugs.    Family History:   Family History   Problem Relation Age of Onset    Other (Other) Father         AAA, TB    Cancer Mother         uterus    Musculo-skelatal Disorder Brother         ankolosing spondylitis    Heart Disorder Brother         valve disorder/pacemaker    Musculo-skelatal Disorder Brother         ankylosing spondylitis    Other (viral headache) Brother        Allergies:   Allergies   Allergen Reactions    Adhesive Tape HIVES and OTHER (SEE COMMENTS)     ZIOpatch adhesive rash    Latex UNKNOWN     Added based on information entered during case entry, please review and add reactions, type, and severity as needed       Medications:    Current Facility-Administered Medications on File Prior to Encounter   Medication Dose Route Frequency Provider Last Rate Last Admin    [COMPLETED] HYDROcodone-acetaminophen (Norco) 5-325 MG per tab 1 tablet  1 tablet Oral Once Manuel Bhakta MD   1 tablet at 24 1414    [COMPLETED] fentaNYL (Sublimaze) 50 mcg/mL injection             [COMPLETED] midazolam (Versed) 2 MG/2ML injection             [COMPLETED] lidocaine (Xylocaine) 1 % injection             [COMPLETED] ceFAZolin (Ancef) 2 g/20mL IV syringe premix             [COMPLETED] iohexol (OMNIPAQUE) 350 MG/ML injection 10 mL  10 mL Intravenous ONCE PRN Lisa Barrera MD   5 mL at 23 0816    [] lactulose (CHRONULAC) 10 GM/15ML  solution 30 g  30 g Oral Q2H Vickie Asencio MD   30 g at 12/29/23 1223    [COMPLETED] furosemide (Lasix) 10 mg/mL injection 20 mg  20 mg Intravenous Once Vickie Asencio MD   20 mg at 12/29/23 1223    [COMPLETED] traZODone (Desyrel) tab 50 mg  50 mg Oral Once Vicki Thacker MD   50 mg at 12/27/23 0119    [COMPLETED] ondansetron (Zofran) 4 MG/2ML injection 4 mg  4 mg Intravenous Once Marie Vaughan MD   4 mg at 12/26/23 1110    [COMPLETED] ketorolac (Toradol) 15 MG/ML injection 15 mg  15 mg Intravenous Once Marie Vaughan MD   15 mg at 12/26/23 1204    [COMPLETED] amLODIPine (Norvasc) tab 10 mg  10 mg Oral Daily Mehnaz Greenwood MD   10 mg at 12/25/23 1136    [COMPLETED] losartan (Cozaar) tab 50 mg  50 mg Oral Daily Mehnaz Greenwood MD   50 mg at 12/25/23 1136    [COMPLETED] metoprolol succinate ER (Toprol XL) 24 hr tab 25 mg  25 mg Oral Daily Beta Blocker Mehnaz Greenwood MD   25 mg at 12/25/23 1136    [COMPLETED] aspirin chewable tab 324 mg  324 mg Oral Once Federica Avila MD   324 mg at 11/10/23 1148    [COMPLETED] regadenoson (Lexiscan) 0.4 mg/5mL injection        0.4 mg at 11/10/23 1423    [COMPLETED] aminophylline 25 mg/mL injection        0.4 mg at 11/10/23 1432     Current Outpatient Medications on File Prior to Encounter   Medication Sig Dispense Refill    gabapentin 100 MG Oral Cap Take 1 capsule (100 mg total) by mouth 3 (three) times daily.      baclofen 10 MG Oral Tab Take 1 tablet (10 mg total) by mouth 3 (three) times daily.      oxyCODONE-acetaminophen 5-325 MG Oral Tab Take 1 tablet by mouth every 6 (six) hours as needed for Pain.      methylPREDNISolone 4 MG Oral Tab Take 1 tablet (4 mg total) by mouth daily.      HYDROcodone-acetaminophen 5-325 MG Oral Tab Take 1-2 tablets by mouth every 6 (six) hours as needed for Pain. 20 tablet 0    metoprolol succinate ER 25 MG Oral Tablet 24 Hr Take 1 tablet (25 mg total) by mouth in the morning and 1 tablet (25 mg  total) before bedtime. 2 TABS EVERY MORNING AND 1 TAN IN EVENING.      amLODIPine 10 MG Oral Tab Take 1 tablet (10 mg total) by mouth daily.      omeprazole 20 MG Oral Capsule Delayed Release Take 1 capsule (20 mg total) by mouth every morning before breakfast.      Netarsudil Dimesylate 0.02 % Ophthalmic Solution Apply 1 drop to eye at bedtime.      Methotrexate Sodium 5 MG Oral Tab Take 8 tablets (40 mg total) by mouth every 7 days. Pt takes every monday      diphenhydrAMINE-APAP, sleep, (TYLENOL PM EXTRA STRENGTH)  MG Oral Tab Take 1 tablet by mouth nightly.      SIMVASTATIN 20 MG Oral Tab Take 1 tablet (20 mg total) by mouth once daily. (Patient taking differently: Take 1 tablet (20 mg total) by mouth nightly.) 90 tablet 0    LOSARTAN 50 MG Oral Tab Take 2 tablets (100 mg total) by mouth daily. (Patient taking differently: Take 1 tablet (50 mg total) by mouth 2 (two) times daily.) 180 tablet 0    Brimonidine Tartrate-Timolol (COMBIGAN) 0.2-0.5 % Ophthalmic Solution Place 1 drop into the left eye 2 (two) times daily. 15 mL 3    FOLIC ACID 1 MG Oral Tab Take 1 tablet (1 mg total) by mouth daily. 90 tablet 3    sulfaSALAzine 500 MG Oral Tab Take 2 tablets (1,000 mg total) by mouth 2 (two) times daily. 360 tablet 1    acetaminophen 500 MG Oral Tab Take 2 tablets (1,000 mg total) by mouth every 6 (six) hours as needed for Pain.      Calcium Citrate-Vitamin D 500-400 MG-UNIT Oral Chew Tab Chew by mouth 2 (two) times daily. Vitamin D 5000 iu daily       Cholecalciferol (VITAMIN D) 1000 UNIT Oral Cap Take by mouth. 2000 DAILY      [] ciprofloxacin 500 MG Oral Tab Take 1 tablet (500 mg total) by mouth 2 (two) times daily for 5 days. 10 tablet 0    HYDROcodone-acetaminophen 5-325 MG Oral Tab Take 1 tablet by mouth every 4 (four) hours as needed. (Patient not taking: Reported on 2024) 20 tablet 0       Review of Systems:   A comprehensive 14 point review of systems was completed.    Pertinent positives  and negatives noted in the HPI.    Physical Exam:    /60 (BP Location: Left arm)   Pulse 68   Temp 98.5 °F (36.9 °C) (Oral)   Resp 20   Ht 5' 6\" (1.676 m)   Wt 161 lb (73 kg)   SpO2 95%   BMI 25.99 kg/m²     General: No acute distress.  Uncomfortable but nontoxic appearing  HEENT: Normocephalic atraumatic. Moist mucous membranes; Sclera anicteric.  Neck: Supple, no JVD  Respiratory: Clear to auscultation bilaterally. Reg resp rate & effort, no wheezes/crackles   Cardiovascular: S1, S2. Regular rate and rhythm. No murmurs appreciable   Chest and Back: Midthoracic spinous process TTP, exam limited by patient participation (pain)  Abdomen: Soft, nonperitoneal  Neurologic: No focal neurological deficits. CNII-XII grossly intact.  Pedal sensation intact to light touch, wiggles toes. Negative straight leg raise bilaterally  Musculoskeletal: Moves all extremities.  5 out of 5 strength in all extremities in all ranges of motion  Extremities: No edema or cyanosis.  Integument: No rashes or lesions.   Psychiatric: Appears uncomfortable/anxious      Diagnostic Data:      Labs:  Recent Labs   Lab 01/12/24 1845   WBC 10.4   HGB 12.2   MCV 99.2   .0       Recent Labs   Lab 01/12/24 1846 01/12/24 2000   *  --    BUN 16  --    CREATSERUM 0.60  --    CA 8.9  --    ALB 3.3*  --    *  --    K  --  4.2     --    CO2 24.0  --    ALKPHO 96  --    AST  --  22   ALT 24  --    BILT 0.5  --    TP 7.4  --        Estimated Creatinine Clearance: 64.2 mL/min (based on SCr of 0.6 mg/dL).    No results for input(s): \"PTP\", \"INR\" in the last 168 hours.    No results for input(s): \"TROP\", \"CK\" in the last 168 hours.    Imaging: Imaging data reviewed in Epic.      ASSESSMENT / PLAN:     Emma Trevizo Is a a 83 year old female who presents for evaluation of uncontrolled chronic back pain    Problem List / Diagnoses    Chronic back pain  Acute closed T9 compression fracture, subsequent  visit  CAD  HTN  HL    Plan    Chronic back pain  Acute closed T9 compression fracture, subsequent visit  -No recent traumas or other inciting events that would suggest new injury  - Exam without evidence of neurologic impairment  - Given timeline from kyphoplasty, persistent pain, will check MRI T/L-spine to ensure stability  -Start Toradol 50 mg every 6 hours as needed  - Change oxycodone 5 to Norco tens, 1 to 2 tablets as needed as needed  -PT/OT eval    CAD  HTN  HL  -Stable, resume home meds    DVT Mechanical Prophylaxis:   SCDs,    DVT Pharmacologic Prophylaxis   Medication    heparin (Porcine) 5000 UNIT/ML injection 5,000 Units              Code Status: Full Code      Dispo: observation; JIM today or tomorrow pending MRI result, PT eval, pain control     Plan of care discussed with patient and/or family at bedside.    N Mike Govea MD  [facility]   [phone]          Electronically signed by Jayson Govea MD on 1/13/2024  3:32 PM              Discharge Summary - D/C Summary      Discharge Summary signed by Mike Cheek DO at 1/20/2024 12:47 PM  Version 3 of 3    Author: Mike Cheek DO Service: Hospitalist Author Type: Physician    Filed: 1/20/2024 12:47 PM Date of Service: 1/20/2024 12:34 PM Status: Addendum    : Mike Cheek DO (Physician)    Related Notes: Original Note by Mike Cheek DO (Physician) filed at 1/20/2024 12:46 PM                                                        [facility] Internal Medicine Hospitalist Discharge Summary     Patient ID:  Emma Trevizo  83 year old  8/20/1940    Admit date: 1/12/2024    Discharge date and time: 1/20/2024    Attending Physician: Mike Cheek DO     Primary Care Physician: Cathy Martinez MD     Discharge Diagnoses:   Acute on Chronic back pain  Acute Closed T8 Compression Fracture, initial visit  Acute closed T9 compression fracture, subsequent visit  COVID-19 infection   Proteus mirabilis  UTI  CAD  HTN  HLD    Please note that only IHP DMG and EMG patients enrolled in the Medicare ACO, [facility] ACO and [facility] HMOs will be handled by the [facility] Management team.  For all other patients, please follow usual protocol for discharge care transition.    Discharge Condition: stable    Disposition:  [facility]    Important Follow up:  - PCP within 1 week of [facility] discharge   - Consults: None    Follow Up Items:  None      Hospital Course:      83 year old female who presents for evaluation of uncontrolled chronic back pain     Problem List / Diagnoses     Acute on Chronic back pain  Acute Closed T8 Compression Fracture, initial visit  Acute closed T9 compression fracture, subsequent visit  CAD  HTN  HL     Plan     Acute on Chronic back pain  Acute Closed T8 Compression Fracture, initial visit  Acute closed T9 compression fracture, subsequent visit  -No recent traumas or other inciting events that would suggest new injury  - Exam without evidence of neurologic impairment  - MRI shows new T8 endplate fracture  -- kypho to be done 1/15 delayed for 10 days due to COVID infection.  TLSO when up.  -s/p Toradol 50 mg every 6 hours as needed  - changed norco to dilaudid 2 mg po Q4 PRN for pain given that she is on paxovid (interacts with norco and percocet)  -PT/OT -> [facility]  - in discussion with IR about rescheduling kypho for 1/25 or 1/26 as outpt.  SW to be notified of the time, pt to go from [facility] to get the procedure.   1/25/24  at 2 pm - SW notified.    - given reports of worsening back pain with cough, d/w IR, check XR of T and L spine to eval for new compression, neg for new fx.      COVID-19 infection   - no hypoxia or PNA on CXR  - started on paxlovid given risk for severe disease, course completed  - schedule tessalon, cont PRN robitussin      Proteus mirabilis UTI  - pt c/o urinary symptoms   - s/p empiric CTX x 4 days  - final C+S done  - discharge to [facility] on 1 more day of duricef     CAD  HTN  HL  -Stable, resume home  meds  - amlodipine dose reduced while pt is on paxlovid, resume regular dose on discharge   - statin on hold as pt is on paxlovid, resume on discharge   - added PRN hydralazine and PRN labetalol for elevated BPs     Stable for discharge to [facility].     Consults: IP CONSULT TO HOSPITALIST  NURSING CONSULT TO DIETITIAN  IP CONSULT TO SOCIAL WORK  IP CONSULT TO PHARMACY    Operative Procedures:  None      Patient instructions:      I as the attending physician reconciled the current and discharge medications on day of discharge.        Medication List        START taking these medications      albuterol 108 (90 Base) MCG/ACT Aers  Commonly known as: Ventolin HFA     benzonatate 100 MG Caps  Commonly known as: Tessalon     bisacodyl 10 MG Supp  Commonly known as: Dulcolax     docusate sodium 100 MG Caps  Commonly known as: COLACE     HYDROmorphone 2 MG Tabs  Commonly known as: Dilaudid  Take 1 tablet (2 mg total) by mouth every 4 (four) hours as needed for Pain.     Polyethylene Glycol 3350 17 g Pack  Commonly known as: MIRALAX  Start taking on: January 21, 2024     sennosides 8.6 MG Tabs  Commonly known as: Senokot            CHANGE how you take these medications      gabapentin 100 MG Caps  Commonly known as: Neurontin  What changed:   when to take this  reasons to take this            CONTINUE taking these medications      acetaminophen 500 MG Tabs  Commonly known as: Tylenol Extra Strength     amLODIPine 10 MG Tabs  Commonly known as: Norvasc     baclofen 10 MG Tabs  Commonly known as: Lioresal     Calcium Citrate-Vitamin D 500-400 MG-UNIT Chew     cefadroxil 500 MG Caps  Commonly known as: DURICEF  Start taking on: January 21, 2024     Combigan 0.2-0.5 % Soln  Generic drug: Brimonidine Tartrate-Timolol  Place 1 drop into the left eye 2 (two) times daily.     folic acid 1 MG Tabs  Commonly known as: Folvite  Take 1 tablet (1 mg total) by mouth daily.     losartan 50 MG Tabs  Commonly known as: Cozaar     Methotrexate  Sodium 5 MG Tabs     metoprolol succinate ER 25 MG Tb24  Commonly known as: Toprol XL     Netarsudil Dimesylate 0.02 % Soln  Commonly known as: Rhopressa     omeprazole 20 MG Cpdr  Commonly known as: PriLOSEC     simvastatin 20 MG Tabs  Commonly known as: Zocor     sulfaSALAzine 500 MG Tabs  Commonly known as: Azulfidine  Take 2 tablets (1,000 mg total) by mouth 2 (two) times daily.     Vitamin D 1000 units Caps            STOP taking these medications      ciprofloxacin 500 MG Tabs  Commonly known as: Cipro     HYDROcodone-acetaminophen 5-325 MG Tabs  Commonly known as: Norco     methylPREDNISolone 4 MG Tabs  Commonly known as: Medrol     oxyCODONE-acetaminophen 5-325 MG Tabs  Commonly known as: Percocet     Tylenol PM Extra Strength 500-25 MG Tabs  Generic drug: diphenhydrAMINE-APAP (sleep)               Where to Get Your Medications        You can get these medications from any pharmacy    Bring a paper prescription for each of these medications  HYDROmorphone 2 MG Tabs              Activity:  Up with assist  Diet: cardiac diet  Wound Care: as directed  Code Status: Full Code      Exam on day of discharge:     Vitals:    01/20/24 1148   BP: 115/41   Pulse: 58   Resp: 16   Temp: 98.3 °F (36.8 °C)       General: NAD, Nontoxic   Respiratory: CTAB; reg resp rate & effort, no wheezes/crackles  Cardiovascular: S1, S2. Regular rate and rhythm. No murmurs appreciated  Abdomen: Soft, NTND, no guarding/rebound   Neurologic: No focal neurological deficits. Distal sensation intact to light touch  Extremities: No edema.   Skin: Dry, no rashes, ulcers or lesions          Total time coordinating care for discharge: Greater than 30 minutes    Mike Cheek DO  [facility] Hospitalist       Electronically signed by Mike Cheek DO on 1/20/2024 12:47 PM     Discharge Summary signed by Mike Cheek DO at 1/20/2024 12:46 PM  Version 2 of 3    Author: Mike Cheek DO Service: Hospitalist Author Type:  Physician    Filed: 1/20/2024 12:46 PM Date of Service: 1/20/2024 12:34 PM Status: Addendum    : Mike Cheek DO (Physician)    Related Notes: Addendum by Mike Cheek DO (Physician) filed at 1/20/2024 12:47 PM  Original Note by Mike Cheek DO (Physician) filed at 1/20/2024 12:39 PM                                                        [facility] Internal Medicine Hospitalist Discharge Summary     Patient ID:  Emma Trevizo  83 year old  8/20/1940    Admit date: 1/12/2024    Discharge date and time: 1/20/2024    Attending Physician: Mike Cheek DO     Primary Care Physician: Cathy Martinez MD     Discharge Diagnoses:   Acute on Chronic back pain  Acute Closed T8 Compression Fracture, initial visit  Acute closed T9 compression fracture, subsequent visit  COVID-19 infection   Proteus mirabilis UTI  CAD  HTN  HLD    Please note that only IHP DMG and EMG patients enrolled in the Medicare ACO, [facility] ACO and [facility] HMOs will be handled by the [facility] Care Management team.  For all other patients, please follow usual protocol for discharge care transition.    Discharge Condition: stable    Disposition:  [facility]    Important Follow up:  - PCP within 1 week of [facility] discharge   - Consults: None    Follow Up Items:  None      Hospital Course:      83 year old female who presents for evaluation of uncontrolled chronic back pain     Problem List / Diagnoses     Acute on Chronic back pain  Acute Closed T8 Compression Fracture, initial visit  Acute closed T9 compression fracture, subsequent visit  CAD  HTN  HL     Plan     Acute on Chronic back pain  Acute Closed T8 Compression Fracture, initial visit  Acute closed T9 compression fracture, subsequent visit  -No recent traumas or other inciting events that would suggest new injury  - Exam without evidence of neurologic impairment  - MRI shows new T8 endplate fracture  -- kypho to be done 1/15 delayed for 10 days due to COVID infection.  TLSO  when up.  -s/p Toradol 50 mg every 6 hours as needed  - changed norco to dilaudid 2 mg po Q4 PRN for pain given that she is on paxovid (interacts with norco and percocet)  -PT/OT -> [facility]  - in discussion with IR about rescheduling kypho for 1/25 or 1/26 as outpt.  SW to be notified of the time, pt to go from [facility] to get the procedure.   1/25/24  at 2 pm - SW notified.    - given reports of worsening back pain with cough, d/w IR, check XR of T and L spine to eval for new compression, neg for new fx.      COVID-19 infection   - no hypoxia or PNA on CXR  - started on paxlovid given risk for severe disease, course completed  - schedule tessalon, cont PRN robitussin      Proteus mirabilis UTI  - pt c/o urinary symptoms   - s/p empiric CTX x 4 days  - final C+S done  - discharge to [facility] on 1 more day of duricef     CAD  HTN  HL  -Stable, resume home meds  - amlodipine dose reduced while pt is on paxlovid, resume regular dose on discharge   - statin on hold as pt is on paxlovid, resume on discharge   - added PRN hydralazine and PRN labetalol for elevated BPs     Stable for discharge to [facility].     Consults: IP CONSULT TO HOSPITALIST  NURSING CONSULT TO DIETITIAN  IP CONSULT TO SOCIAL WORK  IP CONSULT TO PHARMACY    Operative Procedures:  None      Patient instructions:      I as the attending physician reconciled the current and discharge medications on day of discharge.        Medication List        START taking these medications      albuterol 108 (90 Base) MCG/ACT Aers  Commonly known as: Ventolin HFA     benzonatate 100 MG Caps  Commonly known as: Tessalon     bisacodyl 10 MG Supp  Commonly known as: Dulcolax     docusate sodium 100 MG Caps  Commonly known as: COLACE     HYDROmorphone 2 MG Tabs  Commonly known as: Dilaudid  Take 1 tablet (2 mg total) by mouth every 4 (four) hours as needed for Pain.     Polyethylene Glycol 3350 17 g Pack  Commonly known as: MIRALAX  Start taking on: January 21, 2024     sennosides 8.6 MG  Tabs  Commonly known as: Senokot            CHANGE how you take these medications      gabapentin 100 MG Caps  Commonly known as: Neurontin  What changed:   when to take this  reasons to take this            CONTINUE taking these medications      acetaminophen 500 MG Tabs  Commonly known as: Tylenol Extra Strength     amLODIPine 10 MG Tabs  Commonly known as: Norvasc     baclofen 10 MG Tabs  Commonly known as: Lioresal     Calcium Citrate-Vitamin D 500-400 MG-UNIT Chew     Combigan 0.2-0.5 % Soln  Generic drug: Brimonidine Tartrate-Timolol  Place 1 drop into the left eye 2 (two) times daily.     folic acid 1 MG Tabs  Commonly known as: Folvite  Take 1 tablet (1 mg total) by mouth daily.     losartan 50 MG Tabs  Commonly known as: Cozaar     Methotrexate Sodium 5 MG Tabs     metoprolol succinate ER 25 MG Tb24  Commonly known as: Toprol XL     Netarsudil Dimesylate 0.02 % Soln  Commonly known as: Rhopressa     omeprazole 20 MG Cpdr  Commonly known as: PriLOSEC     simvastatin 20 MG Tabs  Commonly known as: Zocor     sulfaSALAzine 500 MG Tabs  Commonly known as: Azulfidine  Take 2 tablets (1,000 mg total) by mouth 2 (two) times daily.     Vitamin D 1000 units Caps            STOP taking these medications      ciprofloxacin 500 MG Tabs  Commonly known as: Cipro     HYDROcodone-acetaminophen 5-325 MG Tabs  Commonly known as: Norco     methylPREDNISolone 4 MG Tabs  Commonly known as: Medrol     oxyCODONE-acetaminophen 5-325 MG Tabs  Commonly known as: Percocet     Tylenol PM Extra Strength 500-25 MG Tabs  Generic drug: diphenhydrAMINE-APAP (sleep)               Where to Get Your Medications        You can get these medications from any pharmacy    Bring a paper prescription for each of these medications  HYDROmorphone 2 MG Tabs          Activity:  Up with assist  Diet: cardiac diet  Wound Care: as directed  Code Status: Full Code      Exam on day of discharge:     Vitals:    01/20/24 1148   BP: 115/41   Pulse: 58   Resp:  16   Temp: 98.3 °F (36.8 °C)       General: NAD, Nontoxic   Respiratory: CTAB; reg resp rate & effort, no wheezes/crackles  Cardiovascular: S1, S2. Regular rate and rhythm. No murmurs appreciated  Abdomen: Soft, NTND, no guarding/rebound   Neurologic: No focal neurological deficits. Distal sensation intact to light touch  Extremities: No edema.   Skin: Dry, no rashes, ulcers or lesions          Total time coordinating care for discharge: Greater than 30 minutes    Mike Cheek DO  [facility] Hospitalist       Electronically signed by Mike Cheek DO on 1/20/2024 12:46 PM     Discharge Summary signed by Mike Cheek DO at 1/20/2024 12:39 PM  Version 1 of 3    Author: Mkie Cheek DO Service: Hospitalist Author Type: Physician    Filed: 1/20/2024 12:39 PM Date of Service: 1/20/2024 12:34 PM Status: Signed    : Mike Cheek DO (Physician)    Related Notes: Addendum by Mike Cheek DO (Physician) filed at 1/20/2024 12:46 PM                                                        [facility] Internal Medicine Hospitalist Discharge Summary     Patient ID:  Emma Trevizo  83 year old  8/20/1940    Admit date: 1/12/2024    Discharge date and time: 1/20/2024    Attending Physician: Mike Cheek DO     Primary Care Physician: Cathy Martinez MD     Discharge Diagnoses:   Acute on Chronic back pain  Acute Closed T8 Compression Fracture, initial visit  Acute closed T9 compression fracture, subsequent visit  COVID-19 infection   Proteus mirabilis UTI  CAD  HTN  HLD    Please note that only IHP DMG and EMG patients enrolled in the Medicare ACO, [facility] ACO and [facility] HMOs will be handled by the [facility] Care Management team.  For all other patients, please follow usual protocol for discharge care transition.    Discharge Condition: stable    Disposition:  [facility]    Important Follow up:  - PCP within 1 week of [facility] discharge   - Consults: None    Follow Up Items:   None      Hospital Course:      83 year old female who presents for evaluation of uncontrolled chronic back pain     Problem List / Diagnoses     Acute on Chronic back pain  Acute Closed T8 Compression Fracture, initial visit  Acute closed T9 compression fracture, subsequent visit  CAD  HTN  HL     Plan     Acute on Chronic back pain  Acute Closed T8 Compression Fracture, initial visit  Acute closed T9 compression fracture, subsequent visit  -No recent traumas or other inciting events that would suggest new injury  - Exam without evidence of neurologic impairment  - MRI shows new T8 endplate fracture  -- kypho to be done 1/15 delayed for 10 days due to COVID infection.  TLSO when up.  -s/p Toradol 50 mg every 6 hours as needed  - changed norco to dilaudid 2 mg po Q4 PRN for pain given that she is on paxovid (interacts with norco and percocet)  -PT/OT -> [facility]  - in discussion with IR about rescheduling kypho for 1/25 or 1/26 as outpt.  SW to be notified of the time, pt to go from [facility] to get the procedure.   1/25/24  at 2 pm - SW notified.    - given reports of worsening back pain with cough, d/w IR, check XR of T and L spine to eval for new compression, neg for new fx.      COVID-19 infection   - no hypoxia or PNA on CXR  - started on paxlovid given risk for severe disease, course completed  - schedule tessalon, cont PRN robitussin      Proteus mirabilis UTI  - pt c/o urinary symptoms   - s/p empiric CTX x 4 days  - final C+S done  - discharge to [facility] on 1 more day of duricef     CAD  HTN  HL  -Stable, resume home meds  - amlodipine dose reduced while pt is on paxlovid, resume regular dose on discharge   - statin on hold as pt is on paxlovid, resume on discharge   - added PRN hydralazine and PRN labetalol for elevated BPs     Stable for discharge to [facility].     Consults: IP CONSULT TO HOSPITALIST  NURSING CONSULT TO DIETITIAN  IP CONSULT TO SOCIAL WORK  IP CONSULT TO PHARMACY    Operative Procedures:  None      Patient  instructions:      I as the attending physician reconciled the current and discharge medications on day of discharge.     Current Discharge Medication List        START taking these medications    Details   HYDROmorphone 2 MG Oral Tab Take 1 tablet (2 mg total) by mouth every 4 (four) hours as needed for Pain.      albuterol 108 (90 Base) MCG/ACT Inhalation Aero Soln Inhale 2 puffs into the lungs every 4 (four) hours as needed for Wheezing.           CONTINUE these medications which have NOT CHANGED    Details   gabapentin 100 MG Oral Cap Take 1 capsule (100 mg total) by mouth 3 (three) times daily.      baclofen 10 MG Oral Tab Take 1 tablet (10 mg total) by mouth 3 (three) times daily.      methylPREDNISolone 4 MG Oral Tab Take 1 tablet (4 mg total) by mouth daily.      metoprolol succinate ER 25 MG Oral Tablet 24 Hr Take 1 tablet (25 mg total) by mouth in the morning and 1 tablet (25 mg total) before bedtime. 2 TABS EVERY MORNING AND 1 TAN IN EVENING.      amLODIPine 10 MG Oral Tab Take 1 tablet (10 mg total) by mouth daily.      omeprazole 20 MG Oral Capsule Delayed Release Take 1 capsule (20 mg total) by mouth every morning before breakfast.      Netarsudil Dimesylate 0.02 % Ophthalmic Solution Apply 1 drop to eye at bedtime.      Methotrexate Sodium 5 MG Oral Tab Take 8 tablets (40 mg total) by mouth every 7 days. Pt takes every monday      diphenhydrAMINE-APAP, sleep, (TYLENOL PM EXTRA STRENGTH)  MG Oral Tab Take 1 tablet by mouth nightly.      SIMVASTATIN 20 MG Oral Tab Take 1 tablet (20 mg total) by mouth once daily.      LOSARTAN 50 MG Oral Tab Take 2 tablets (100 mg total) by mouth daily.      Brimonidine Tartrate-Timolol (COMBIGAN) 0.2-0.5 % Ophthalmic Solution Place 1 drop into the left eye 2 (two) times daily.      FOLIC ACID 1 MG Oral Tab Take 1 tablet (1 mg total) by mouth daily.      sulfaSALAzine 500 MG Oral Tab Take 2 tablets (1,000 mg total) by mouth 2 (two) times daily.      acetaminophen  500 MG Oral Tab Take 2 tablets (1,000 mg total) by mouth every 6 (six) hours as needed for Pain.      Calcium Citrate-Vitamin D 500-400 MG-UNIT Oral Chew Tab Chew by mouth 2 (two) times daily. Vitamin D 5000 iu daily       Cholecalciferol (VITAMIN D) 1000 UNIT Oral Cap Take by mouth. 2000 DAILY           STOP taking these medications       oxyCODONE-acetaminophen 5-325 MG Oral Tab        HYDROcodone-acetaminophen 5-325 MG Oral Tab        ciprofloxacin 500 MG Oral Tab        HYDROcodone-acetaminophen 5-325 MG Oral Tab              Activity:  Up with assist  Diet: cardiac diet  Wound Care: as directed  Code Status: Full Code      Exam on day of discharge:     Vitals:    01/20/24 1148   BP: 115/41   Pulse: 58   Resp: 16   Temp: 98.3 °F (36.8 °C)       General: NAD, Nontoxic   Respiratory: CTAB; reg resp rate & effort, no wheezes/crackles  Cardiovascular: S1, S2. Regular rate and rhythm. No murmurs appreciated  Abdomen: Soft, NTND, no guarding/rebound   Neurologic: No focal neurological deficits. Distal sensation intact to light touch  Extremities: No edema.   Skin: Dry, no rashes, ulcers or lesions          Total time coordinating care for discharge: Greater than 30 minutes    Mike Cheek DO  [facility]       Electronically signed by Mike Cheek DO on 1/20/2024 12:39 PM              Physical Therapy Notes (last 72 hours)      Physical Therapy Note signed by Emily Stiles PTA at 1/19/2024  9:59 AM  Version 1 of 1    Author: Emily Stiles PTA Service: Rehab Author Type: Physical Therapy Assistant    Filed: 1/19/2024  9:59 AM Date of Service: 1/19/2024  9:59 AM Status: Signed    : Emily Stiles PTA (Physical Therapy Assistant)       Attempted to see Pt this AM - RN aware of attempt.  Pt awaiting additional x-ray - MD Cheek ordered recently.  Pt wanting to wait for mobility until after completion of testing.  Will f/u later today if time permits, after all other  patients are attempted per tentative schedule.                   Occupational Therapy Notes (last 72 hours)      Occupational Therapy Note signed by Tolu Haro OT at 1/19/2024  9:52 AM  Version 1 of 1    Author: Tolu Haro OT Service: Rehab Author Type: Occupational Therapist    Filed: 1/19/2024  9:52 AM Date of Service: 1/19/2024  9:51 AM Status: Signed    : Tolu Haro OT (Occupational Therapist)       Attempted to see pt for skilled OT services this date. Per RN, XR ordered for thoracic/spine to r/o new compression fx. Will follow-up later today as schedule permits.               Video Swallow Study Notes    No notes of this type exist for this encounter.     SLP Notes    No notes of this type exist for this encounter.     Immunizations     Name Date      Covid-19 Pfizer 10/19/21     Covid-19 Pfizer 02/09/21     Covid-19 Pfizer 01/19/21     Depo-Medrol 40mg Inj 01/22/21     Depo-Medrol 40mg Inj 01/23/20     Depo-Medrol 40mg Inj 07/03/19     Dexamethasone Sodium Phosphate 4 mg-ml 11/22/17     Fluad 0.5ml 10/12/17     HEP A 06/06/08     INFLUENZA defer-03/07/22     Deferral: +Patient Refuses Z28.21     INFLUENZA 09/15/19     INFLUENZA 09/02/19     INFLUENZA 09/02/19     INFLUENZA 09/19/18     INFLUENZA 09/11/16     INFLUENZA 09/10/16     INFLUENZA 09/01/16     INFLUENZA 09/29/15     INFLUENZA 09/28/15     INFLUENZA 11/10/14     INFLUENZA 10/18/13     INFLUENZA 10/06/11     INFLUENZA 10/07/09     INFLUENZA 10/13/08     INFLUENZA 10/09/07     Influenza Virus Vaccine, Split 09/28/12     Kenalog Per 10mg, Bursa/Joint Inj 03/20/15     Ondansetron Hcl Injection 11/22/17     Pneumococcal (Prevnar 13) 12/03/14     Pneumovax 11/17/16     Pneumovax 23 11/16/16     Prolia Im Inj, Up To 60 Mg 04/26/16     Prolia Im Inj, Up To 60 Mg 04/30/15     TDAP 06/06/08     Technetium Tc99mm Sestamibi, Iv, Up To 40 Millicuries 01/22/09     Toradol Per 15mg, Im Inj 11/22/17     Unclassified Drug, Admin In Office 09/15/21      Unclassified Drug, Admin In Office 04/26/21     Unclassified Drug, Admin In Office 06/11/20     Unclassified Drug, Admin In Office 10/18/19       Multidisciplinary Problems     Active Goals        Problem: Patient/Family Goals    Goal Priority Disciplines Outcome Interventions   Patient/Family Long Term Goal     Interdisciplinary Progressing    Description: Patient's Long Term Goal: To not keep getting admitted for back pain    Interventions:  - Monitor pain levels  - Pain medications  - PT/OT eval  - F/u with doctor  - See additional Care Plan goals for specific interventions   Patient/Family Short Term Goal     Interdisciplinary Progressing    Description: Patient's Short Term Goal: To ambulate without pain    Interventions:   - Heat pack  - PT/OT eval  - Pain medication  - F/u with doctor  - See additional Care Plan goals for specific interventions

## (undated) NOTE — ED AVS SNAPSHOT
Melanie Madrid   MRN: [id number]    Department:  [facility] Emergency Department   Date of Visit:  2/3/2019           Disclosure     Insurance plans vary and the physician(s) referred by the ER may not be covered by your plan.  Please contact your tell this physician (or your personal doctor if your instructions are to return to your personal doctor) about any new or lasting problems. The primary care or specialist physician will see patients referred from the [facility] Emergency Department.  Grace Boykin

## (undated) NOTE — LETTER
Consent to Procedure/Sedation    Date: ____10/19/2017______________    Time: ___1345____________    1. I authorize the performance upon Annabel Sender the following:    Kyphoplasty of T12 vertebral bady    2.  I authorize  ____Makayla_____________________ Mariola Holt Signature of person authorized to consent for patient: Relationship to patient:  ___________________________    ___________________    Witness: ____________________     Date: ______________    Printed: 10/19/2017   1:20 PM    Patient Name: Annabel Gonzalez

## (undated) NOTE — LETTER
12/12/2017          [place] Denny [address]    Dear Blanquita Grover,     Here are the biopsy/pathology findings from your recent EGD (Upper  Endoscopy):   The stomach and esophageal biopsies are normal.  The duodenum biopsies show

## (undated) NOTE — LETTER
[address]  [address]  Consent for Procedure/Sedation  Date: 1/15/24         Time: 1300    I hereby authorize Dr Cleveland, my physician and his/her assistants (if applicable), which may include medical students, residents, and/or fellows, to perform the following surgical operation/ procedure and administer such anesthesia as may be determined necessary by my physician: Kyphoplasty on Emma Trevizo  2.   I recognize that during the surgical operation/procedure, unforeseen conditions may necessitate additional or different procedures than those listed above.  I, therefore, further authorize and request that the above-named surgeon, assistants, or designees perform such procedures as are, in their judgment, necessary and desirable.    3.   My surgeon/physician has discussed prior to my surgery the potential benefits, risks and side effects of this procedure; the likelihood of achieving goals; and potential problems that might occur during recuperation.  They also discussed reasonable alternatives to the procedure, including risks, benefits, and side effects related to the alternatives and risks related to not receiving this procedure.  I have had all my questions answered and I acknowledge that no guarantee has been made as to the result that may be obtained.    4.   Should the need arise during my operation/procedure, which includes change of level of care prior to discharge, I also consent to the administration of blood and/or blood products.  Further, I understand that despite careful testing and screening of blood or blood products by collecting agencies, I may still be subject to ill effects as a result of receiving a blood transfusion and/or blood products.  The following are some, but not all, of the potential risks that can occur: fever and allergic reactions, hemolytic reactions, transmission of diseases such as Hepatitis, AIDS and Cytomegalovirus (CMV) and fluid  overload.  In the event that I wish to have an autologous transfusion of my own blood, or a directed donor transfusion, I will discuss this with my physician.   Check only if Refusing Blood or Blood Products  I understand refusal of blood or blood products as deemed necessary by my physician may have serious consequences to my condition to include possible death. I hereby assume responsibility for my refusal and release the hospital, its personnel, and my physicians from any responsibility for the consequences of my refusal.         o  Refuse         5.   I authorize the use of any specimen, organs, tissues, body parts or foreign objects that may be removed from my body during the operation/procedure for diagnosis, research or teaching purposes and their subsequent disposal by hospital authorities.  I also authorize the release of specimen test results and/or written reports to my treating physician on the hospital medical staff or other referring or consulting physicians involved in my care, at the discretion of the Pathologist or my treating physician.    6.   I consent to the photographing or videotaping of the operations or procedures to be performed, including appropriate portions of my body for medical, scientific, or educational purposes, provided my identity is not revealed by the pictures or by descriptive texts accompanying them.  If the procedure has been photographed/videotaped, the surgeon will obtain the original picture, image, videotape or CD.  The hospital will not be responsible for storage, release or maintenance of the picture, image, tape or CD.    7.   I consent to the presence of a  or observers in the operating room as deemed necessary by my physician or their designees.    8.   I recognize that in the event my procedure results in extended X-Ray/fluoroscopy time, I may develop a skin reaction.    9. If I have a Do Not Attempt Resuscitation (DNAR) order in place, that status  will be suspended while in the operating room, procedural suite, and during the recovery period unless otherwise explicitly stated by me (or a person authorized to consent on my behalf). The surgeon or my attending physician will determine when the applicable recovery period ends for purposes of reinstating the DNAR order.  10. Patients having a sterilization procedure: I understand that if the procedure is successful the results will be permanent and it will therefore be impossible for me to inseminate, conceive, or bear children.  I also understand that the procedure is intended to result in sterility, although the result has not been guaranteed.   11. I acknowledge that my physician has explained sedation/analgesia administration to me including the risk and benefits I consent to the administration of sedation/analgesia as may be necessary or desirable in the judgment of my physician.    I CERTIFY THAT I HAVE READ AND FULLY UNDERSTAND THE ABOVE CONSENT TO OPERATION and/or OTHER PROCEDURE.        ____________________________________       _________________________________      ______________________________  Signature of Patient         Signature of Responsible Person        Printed Name of Responsible Person        ____________________________________      _________________________________      ______________________________       Signature of Witness          Relationship to Patient                       Date                                       Time  Patient Name: Emma POSEY Santhosh     : 1940                 Printed: January 15, 2024      Medical Record #: [id number]                      Page 1 of 1

## (undated) NOTE — LETTER
Consent to Procedure/Sedation    Date: ____10/19/17______________    Time: ___1345____________    1. I authorize the performance upon Cherelle Peralta the following:    Kyphoplasty of T12 vertebral body    2.  I authorize Dr Ochoa Reaves (and whomever is designated a ___________________________    ___________________    Witness: ____________________     Date: ______________    Printed: 10/19/2017   1:33 PM    Patient Name: Berenice Espinoiner        : 1940       Medical Record #: [id number]

## (undated) NOTE — LETTER
[facility] [address], [address]  Consent for Procedure/Sedation    Date:     Time:       1.  I authorize the performance upon Omelia Listen the following:  THORACIC TWELVE KYPHOPLASTY     2. I authorize Dr. Priya Harmon (and whomever is ________________________________    ___________________    Witness: _________________________      Date: ___________________    Printed: 10/30/2017   3:18 PM  Patient Name: Leigh Bruce        : 1940       Medical Record #: [id number]

## (undated) NOTE — LETTER
[facility] [address], [address]    Consent for Operation     Date: ___December 8 th, 2017_______________    Time: ____1500___________    1.  I authorize the performance upon Fernando Smith  the following operation:    Esophagoga procedure has been videotaped, the surgeon will obtain the original videotape. The hospital will not be responsible for storage or maintenance of this tape.     6. For the purpose of advancing medical education, I consent to the admittance of observers to t STATEMENTS REQUIRING INSERTION OR COMPLETION WERE FILLED IN.     Signature of Patient:   ___________________________    When the patient is a minor or mentally incompetent to give consent:  Signature of person authorized to consent for patient: ____________ drugs/illegal medications). Failure to inform my anesthesiologist about these medicines may increase my risk of anesthetic complications. · If I am allergic to anything or have had a reaction to anesthesia before.     3. I understand how the anesthesia med I have discussed the procedure and information above with the patient (or patient’s representative) and answered their questions. The patient or their representative has agreed to have anesthesia services.     _______________________________________________